# Patient Record
Sex: FEMALE | Race: WHITE | NOT HISPANIC OR LATINO | Employment: OTHER | URBAN - METROPOLITAN AREA
[De-identification: names, ages, dates, MRNs, and addresses within clinical notes are randomized per-mention and may not be internally consistent; named-entity substitution may affect disease eponyms.]

---

## 2017-01-01 ENCOUNTER — APPOINTMENT (OUTPATIENT)
Dept: ULTRASOUND IMAGING | Facility: HOSPITAL | Age: 82
End: 2017-01-01

## 2017-01-01 ENCOUNTER — APPOINTMENT (OUTPATIENT)
Dept: GENERAL RADIOLOGY | Facility: HOSPITAL | Age: 82
End: 2017-01-01

## 2017-01-01 PROCEDURE — 71020 HC CHEST PA AND LATERAL: CPT

## 2017-01-01 PROCEDURE — 76942 ECHO GUIDE FOR BIOPSY: CPT

## 2017-02-20 ENCOUNTER — OFFICE VISIT (OUTPATIENT)
Dept: FAMILY MEDICINE CLINIC | Facility: CLINIC | Age: 82
End: 2017-02-20

## 2017-02-20 VITALS
BODY MASS INDEX: 15.36 KG/M2 | DIASTOLIC BLOOD PRESSURE: 80 MMHG | RESPIRATION RATE: 16 BRPM | WEIGHT: 90 LBS | TEMPERATURE: 95 F | HEIGHT: 64 IN | SYSTOLIC BLOOD PRESSURE: 140 MMHG

## 2017-02-20 DIAGNOSIS — R00.0 TACHYCARDIA: ICD-10-CM

## 2017-02-20 DIAGNOSIS — I27.20 PULMONARY HYPERTENSION (HCC): ICD-10-CM

## 2017-02-20 DIAGNOSIS — E11.9 TYPE 2 DIABETES MELLITUS WITHOUT COMPLICATION, UNSPECIFIED LONG TERM INSULIN USE STATUS: ICD-10-CM

## 2017-02-20 DIAGNOSIS — J18.9 PNEUMONIA OF LEFT LUNG DUE TO INFECTIOUS ORGANISM, UNSPECIFIED PART OF LUNG: Primary | ICD-10-CM

## 2017-02-20 DIAGNOSIS — D64.9 ANEMIA, UNSPECIFIED TYPE: ICD-10-CM

## 2017-02-20 DIAGNOSIS — D64.9 ANEMIA, UNSPECIFIED TYPE: Primary | ICD-10-CM

## 2017-02-20 LAB
BASOPHILS # BLD AUTO: 0.01 10*3/MM3 (ref 0–0.2)
BASOPHILS NFR BLD AUTO: 0.2 % (ref 0–1.5)
BUN SERPL-MCNC: 18 MG/DL (ref 8–23)
BUN/CREAT SERPL: 31 (ref 7–25)
CALCIUM SERPL-MCNC: 9.6 MG/DL (ref 8.6–10.5)
CHLORIDE SERPL-SCNC: 103 MMOL/L (ref 98–107)
CO2 SERPL-SCNC: 30.4 MMOL/L (ref 22–29)
CREAT SERPL-MCNC: 0.58 MG/DL (ref 0.57–1)
EOSINOPHIL # BLD AUTO: 0.28 10*3/MM3 (ref 0–0.7)
EOSINOPHIL NFR BLD AUTO: 4.6 % (ref 0.3–6.2)
ERYTHROCYTE [DISTWIDTH] IN BLOOD BY AUTOMATED COUNT: 17 % (ref 11.7–13)
GLUCOSE SERPL-MCNC: 217 MG/DL (ref 65–99)
HCT VFR BLD AUTO: 37.5 % (ref 35.6–45.5)
HGB BLD-MCNC: 11.7 G/DL (ref 11.9–15.5)
IMM GRANULOCYTES # BLD: 0 10*3/MM3 (ref 0–0.03)
IMM GRANULOCYTES NFR BLD: 0 % (ref 0–0.5)
LYMPHOCYTES # BLD AUTO: 1.29 10*3/MM3 (ref 0.9–4.8)
LYMPHOCYTES NFR BLD AUTO: 21 % (ref 19.6–45.3)
MCH RBC QN AUTO: 28.1 PG (ref 26.9–32)
MCHC RBC AUTO-ENTMCNC: 31.2 G/DL (ref 32.4–36.3)
MCV RBC AUTO: 90.1 FL (ref 80.5–98.2)
MONOCYTES # BLD AUTO: 0.42 10*3/MM3 (ref 0.2–1.2)
MONOCYTES NFR BLD AUTO: 6.9 % (ref 5–12)
NEUTROPHILS # BLD AUTO: 4.13 10*3/MM3 (ref 1.9–8.1)
NEUTROPHILS NFR BLD AUTO: 67.3 % (ref 42.7–76)
PLATELET # BLD AUTO: 279 10*3/MM3 (ref 140–500)
POTASSIUM SERPL-SCNC: 3.5 MMOL/L (ref 3.5–5.2)
RBC # BLD AUTO: 4.16 10*6/MM3 (ref 3.9–5.2)
SODIUM SERPL-SCNC: 147 MMOL/L (ref 136–145)
WBC # BLD AUTO: 6.13 10*3/MM3 (ref 4.5–10.7)

## 2017-02-20 PROCEDURE — 99213 OFFICE O/P EST LOW 20 MIN: CPT

## 2017-02-20 NOTE — PROGRESS NOTES
Subjective   Maite Pacheco is a 81 y.o. female. Patient is here today for   Chief Complaint   Patient presents with   • Follow-up     Hospital f/u pneumonia           Vitals:    02/20/17 0954   BP: 140/80   Resp: 16   Temp: 95 °F (35 °C)     The following portions of the patient's history were reviewed and updated as appropriate: allergies, current medications, past family history, past medical history, past social history, past surgical history and problem list.    Past Medical History   Diagnosis Date   • Arthritis    • Diabetes mellitus    • Hyperglycemia    • Hyperlipidemia    • Inguinal hernia    • Osteoporosis       Allergies   Allergen Reactions   • Sulfa Antibiotics Rash      Social History     Social History   • Marital status: Single     Spouse name: N/A   • Number of children: N/A   • Years of education: N/A     Occupational History   • Not on file.     Social History Main Topics   • Smoking status: Current Every Day Smoker     Packs/day: 1.00     Years: 50.00     Types: Cigarettes   • Smokeless tobacco: Never Used   • Alcohol use No   • Drug use: No   • Sexual activity: Defer     Other Topics Concern   • Not on file     Social History Narrative        Current Outpatient Prescriptions:   •  atorvastatin (LIPITOR) 10 MG tablet, Take 1 tablet by mouth  daily, Disp: 90 tablet, Rfl: 3  •  Calcium Carbonate-Vitamin D (CALCIUM + D PO), Take 2 tablets by mouth. 500 IU of Vit D 630mg of Calcium, Disp: , Rfl:   •  diltiaZEM SR (CARDIZEM SR) 60 MG 12 hr capsule, Take 1 capsule by mouth Every 12 (Twelve) Hours., Disp: 60 capsule, Rfl: 1  •  metFORMIN (GLUCOPHAGE) 500 MG tablet, Take 1 tablet by mouth  twice a day, Disp: 180 tablet, Rfl: 3  •  MULTIPLE VITAMINS PO, Take 1 tablet by mouth., Disp: , Rfl:      Objective     History of Present Illness   The patient is here today for follow-up on pneumonia for which she was hospitalized in early January 2017    Review of Systems   Constitutional: Negative for chills and  fever.   Respiratory: Negative for cough, shortness of breath and wheezing.    Cardiovascular: Negative for chest pain, palpitations and leg swelling.   Gastrointestinal: Negative for abdominal pain, blood in stool, constipation and diarrhea.   Genitourinary: Negative.    Musculoskeletal: Negative.    Neurological: Negative.    Psychiatric/Behavioral: Negative.        Physical Exam   Constitutional: She is oriented to person, place, and time. She appears well-developed and well-nourished.   Cardiovascular: Normal rate, regular rhythm and normal heart sounds.    Pulmonary/Chest: Effort normal and breath sounds normal. No respiratory distress. She has no wheezes. She has no rales.   Abdominal: Soft. Bowel sounds are normal.   Musculoskeletal: Normal range of motion.   Neurological: She is alert and oriented to person, place, and time.   Skin: Skin is warm and dry.   Psychiatric: She has a normal mood and affect.   Nursing note and vitals reviewed.      ASSESSMENT  #1 history of recent left sided pneumonia          #2 recent diagnosis of pulmonary hypertension per echo done while patient hospitalized for pneumonia         #3 history of episode of supraventricular tachycardia while in the hospital for pneumonia          #4 anemia    DISCUSSION/SUMMARY   The patient's vital signs are normal today.  The patient was hospitalized for a severe left sided pneumonia with pleural effusion in early January.  While there the patient had an echocardiogram which showed moderate to severe pulmonary hypertension.  While in the hospital the patient had an episode of supraventricular tachycardia and was placed on diltiazem.  After her hospitalization the patient went to a rehabilitation center for about 2 weeks or so.  The patient states that she is feeling much better, having no cough or fever.  The patient's appetite is improving.  It was advised that the patient have a repeat CT of her chest so we will order this.  She will also  need follow-up with the pulmonologist and her cardiologist who saw her in the hospital.  Today I'm going to have a CBC and BMP drawn because of a history of anemia in the fact that she will need to have a CT scan need to know the status of her kidney functions.    PLAN  I will set up a repeat CT of her chest to follow-up on her pneumonia and lymphadenopathy detected time she had her CT while hospitalized in early January.  We will also set up follow-up appointments with her cardiologist and pulmonologist.  No Follow-up on file.

## 2017-02-27 ENCOUNTER — HOSPITAL ENCOUNTER (OUTPATIENT)
Dept: CT IMAGING | Facility: HOSPITAL | Age: 82
Discharge: HOME | End: 2017-02-27

## 2017-02-27 DIAGNOSIS — J18.9 PNEUMONIA OF LEFT LUNG DUE TO INFECTIOUS ORGANISM, UNSPECIFIED PART OF LUNG: ICD-10-CM

## 2017-02-27 LAB — CREAT BLDA-MCNC: 0.5 MG/DL (ref 0.6–1.3)

## 2017-02-27 PROCEDURE — 0 IOPAMIDOL 61 % SOLUTION

## 2017-02-27 PROCEDURE — 82565 ASSAY OF CREATININE: CPT

## 2017-02-27 PROCEDURE — 71260 CT THORAX DX C+: CPT

## 2017-02-27 RX ADMIN — IOPAMIDOL 75 ML: 612 INJECTION, SOLUTION INTRAVENOUS at 11:15

## 2017-03-01 ENCOUNTER — OFFICE VISIT (OUTPATIENT)
Dept: CARDIOLOGY | Facility: CLINIC | Age: 82
End: 2017-03-01

## 2017-03-01 ENCOUNTER — APPOINTMENT (OUTPATIENT)
Dept: LAB | Facility: HOSPITAL | Age: 82
End: 2017-03-01

## 2017-03-01 VITALS
SYSTOLIC BLOOD PRESSURE: 110 MMHG | HEIGHT: 63 IN | HEART RATE: 90 BPM | WEIGHT: 92 LBS | BODY MASS INDEX: 16.3 KG/M2 | DIASTOLIC BLOOD PRESSURE: 60 MMHG

## 2017-03-01 DIAGNOSIS — I27.20 PULMONARY HYPERTENSION (HCC): ICD-10-CM

## 2017-03-01 DIAGNOSIS — I49.3 PVC (PREMATURE VENTRICULAR CONTRACTION): Primary | ICD-10-CM

## 2017-03-01 LAB
ANION GAP SERPL CALCULATED.3IONS-SCNC: 11 MMOL/L
BUN BLD-MCNC: 16 MG/DL (ref 8–23)
BUN/CREAT SERPL: 31.4 (ref 7–25)
CALCIUM SPEC-SCNC: 9.8 MG/DL (ref 8.6–10.5)
CHLORIDE SERPL-SCNC: 104 MMOL/L (ref 98–107)
CO2 SERPL-SCNC: 29 MMOL/L (ref 22–29)
CREAT BLD-MCNC: 0.51 MG/DL (ref 0.57–1)
GFR SERPL CREATININE-BSD FRML MDRD: 116 ML/MIN/1.73
GLUCOSE BLD-MCNC: 124 MG/DL (ref 65–99)
MAGNESIUM SERPL-MCNC: 1.8 MG/DL (ref 1.6–2.4)
POTASSIUM BLD-SCNC: 3.6 MMOL/L (ref 3.5–5.2)
SODIUM BLD-SCNC: 144 MMOL/L (ref 136–145)

## 2017-03-01 PROCEDURE — 80048 BASIC METABOLIC PNL TOTAL CA: CPT | Performed by: INTERNAL MEDICINE

## 2017-03-01 PROCEDURE — 99214 OFFICE O/P EST MOD 30 MIN: CPT | Performed by: INTERNAL MEDICINE

## 2017-03-01 PROCEDURE — 36415 COLL VENOUS BLD VENIPUNCTURE: CPT | Performed by: INTERNAL MEDICINE

## 2017-03-01 PROCEDURE — 93000 ELECTROCARDIOGRAM COMPLETE: CPT | Performed by: INTERNAL MEDICINE

## 2017-03-01 PROCEDURE — 83735 ASSAY OF MAGNESIUM: CPT | Performed by: INTERNAL MEDICINE

## 2017-03-01 NOTE — PROGRESS NOTES
Date of Office Visit: 2017  Encounter Provider: Carolin Lantigua MD  Place of Service: [facility]  Patient Name: Maite Pacheco  :1935    Chief complaint  Follow-up of paroxysmal supraventricular tachycardia.    History of Present Illness  Patient is a pleasant 81-year-old female with history of diabetes hyperlipidemia who was admitted on  with pneumonia with acute respiratory failure hypoxia sepsis and left-sided rib pain from a loculated effusion..  In this setting she developed paroxysmal supraventricular tachycardia that resolved with IV Lopressor and then switch to oral metoprolol.  Due to pulmonary wheeze she was transitioned to diltiazem and sent to rehabilitation on this regimen.  An echocardiogram revealed normal systolic function with severe pulmonary hypertension with an RV systolic pressure 66 mmHg without significant valvular heart disease.    While in the rehabilitation she discontinued diltiazem due to complaints of worsening palpitations which resolved after this was discontinued.  She feels she is back to most all of her usual activities go shopping and does household activities without any chest pain shortness of breath palpitations syncope near syncope.    Past Medical History   Diagnosis Date   • Abnormal CT of the abdomen    • Allergic rhinitis    • Arthritis    • Chronic pancreatitis    • Diabetes mellitus    • Hyperglycemia    • Hyperlipidemia    • Inguinal hernia    • Neck pain    • Osteoporosis    • Pancreatic cyst    • Urinary frequency      Past Surgical History   Procedure Laterality Date   • Appendectomy     • Inguinal hernia repair Left 2008     Open left inguinal henria repair with mesh-Dr. Zahraa Brito    • Colonoscopy N/A 2003     Colonoscopy to the cecum, terminal ileoscopy-Dr. Zahraa Brito      Outpatient Medications Prior to Visit   Medication Sig Dispense Refill   • atorvastatin (LIPITOR) 10 MG tablet  Take 1 tablet by mouth  daily 90 tablet 3   • Calcium Carbonate-Vitamin D (CALCIUM + D PO) Take 2 tablets by mouth. 500 IU of Vit D 630mg of Calcium     • metFORMIN (GLUCOPHAGE) 500 MG tablet Take 1 tablet by mouth  twice a day 180 tablet 3   • MULTIPLE VITAMINS PO Take 1 tablet by mouth.     • diltiaZEM SR (CARDIZEM SR) 60 MG 12 hr capsule Take 1 capsule by mouth Every 12 (Twelve) Hours. 60 capsule 1     No facility-administered medications prior to visit.      Allergies as of 03/01/2017 - Sin as Reviewed 03/01/2017   Allergen Reaction Noted   • Sulfa antibiotics Rash 04/05/2016     Social History     Social History   • Marital status: Single     Spouse name: N/A   • Number of children: N/A   • Years of education: N/A     Occupational History   • Not on file.     Social History Main Topics   • Smoking status: Former Smoker     Packs/day: 1.00     Years: 50.00     Types: Cigarettes   • Smokeless tobacco: Never Used   • Alcohol use No   • Drug use: No   • Sexual activity: Defer     Other Topics Concern   • Not on file     Social History Narrative     Family History   Problem Relation Age of Onset   • Stroke Mother    • Heart disease Mother    • Heart disease Father      Review of Systems   Constitution: Negative for fever, malaise/fatigue, weight gain and weight loss.   HENT: Negative for ear pain, hearing loss, nosebleeds and sore throat.    Eyes: Negative for double vision, pain, vision loss in left eye and vision loss in right eye.   Cardiovascular:        See history of present illness.   Respiratory: Negative for cough, shortness of breath, sleep disturbances due to breathing, snoring and wheezing.    Endocrine: Negative for cold intolerance, heat intolerance and polyuria.   Skin: Negative for itching, poor wound healing and rash.   Musculoskeletal: Negative for joint pain, joint swelling and myalgias.   Gastrointestinal: Negative for abdominal pain, diarrhea, hematochezia, nausea and vomiting.   Genitourinary:  "Negative for hematuria and hesitancy.   Neurological: Negative for numbness, paresthesias and seizures.   Psychiatric/Behavioral: Negative for depression. The patient is not nervous/anxious.      Objective:     Vitals:    03/01/17 1244   BP: 110/60   Pulse: 90   Weight: 92 lb (41.7 kg)   Height: 63\" (160 cm)     Body mass index is 16.3 kg/(m^2).    Physical Exam   Constitutional: She is oriented to person, place, and time. She appears well-developed and well-nourished.   HENT:   Head: Normocephalic.   Nose: Nose normal.   Mouth/Throat: Oropharynx is clear and moist.   Eyes: Conjunctivae and EOM are normal. Pupils are equal, round, and reactive to light. Right eye exhibits no discharge. No scleral icterus.   Neck: Normal range of motion. Neck supple. No JVD present. No thyromegaly present.   Cardiovascular: Normal rate, regular rhythm, normal heart sounds and intact distal pulses.  Exam reveals no gallop and no friction rub.    No murmur heard.  Pulses:       Carotid pulses are 2+ on the right side, and 2+ on the left side.       Radial pulses are 2+ on the right side, and 2+ on the left side.        Femoral pulses are 2+ on the right side, and 2+ on the left side.       Popliteal pulses are 2+ on the right side, and 2+ on the left side.        Dorsalis pedis pulses are 2+ on the right side, and 2+ on the left side.        Posterior tibial pulses are 2+ on the right side, and 2+ on the left side.   Pulmonary/Chest: Effort normal and breath sounds normal. No respiratory distress. She has no wheezes. She has no rales.   Abdominal: Soft. Bowel sounds are normal. She exhibits no distension. There is no hepatosplenomegaly. There is no tenderness. There is no rebound.   Musculoskeletal: Normal range of motion. She exhibits no edema or tenderness.   Neurological: She is alert and oriented to person, place, and time.   Skin: Skin is warm and dry. No rash noted. No erythema.   Psychiatric: She has a normal mood and affect. " Her behavior is normal. Judgment and thought content normal.   Vitals reviewed.    Lab Review:     ECG 12 Lead  Date/Time: 3/4/2017 2:55 PM  Performed by: MARIEL BURR  Authorized by: MARIEL BURR   Comparison: compared with previous ECG   Similar to previous ECG  Rhythm: sinus rhythm  Ectopy: PVCs  Conduction comments: QTc = 460 msec  Clinical impression: abnormal ECG          Assessment:       Diagnosis Plan   1. PVC (premature ventricular contraction)  Basic Metabolic Panel    Magnesium   2. Pulmonary hypertension       Plan:       1.  Paroxysmal supra-ventricular tachycardia.  This was a postpneumonic event.  Blood pressure borderline low and she is audibly wheezing therefore we'll defer heading back metoprolol at this time.  2.  Persistent left pleural effusion and lung disease with wheeze.  I've asked her to follow-up with Dr. Amaro whom she plans to see in the next one week.  3.  PVCs, as above in addition we'll check a magnesium potassium.  Once her pulmonary status has improved we'll consider a Lexiscan cardiac stress test   4.  Diabetes  5.  Dyslipidemia     Maite Pacheco   Home Medication Instructions ALEN:    Printed on:03/04/17 8129   Medication Information                      atorvastatin (LIPITOR) 10 MG tablet  Take 1 tablet by mouth  daily             Calcium Carbonate-Vitamin D (CALCIUM + D PO)  Take 2 tablets by mouth. 500 IU of Vit D 630mg of Calcium             metFORMIN (GLUCOPHAGE) 500 MG tablet  Take 1 tablet by mouth  twice a day             MULTIPLE VITAMINS PO  Take 1 tablet by mouth.               EMR Dragon/Transcription disclaimer:   Much of this encounter note is an electronic transcription/translation of spoken language to printed text. The electronic translation of spoken language may permit erroneous, or at times, nonsensical words or phrases to be inadvertently transcribed; Although I have reviewed the note for such errors, some may still exist.

## 2017-03-02 ENCOUNTER — TELEPHONE (OUTPATIENT)
Dept: FAMILY MEDICINE CLINIC | Facility: CLINIC | Age: 82
End: 2017-03-02

## 2017-03-02 ENCOUNTER — TELEPHONE (OUTPATIENT)
Dept: CARDIOLOGY | Facility: CLINIC | Age: 82
End: 2017-03-02

## 2017-03-02 NOTE — TELEPHONE ENCOUNTER
PATIENT AWARE. SHE SAID SHE WOULD CALL AND SET UP THE APPOINTMENT WITH THE PULMONOLOGIST.    ----- Message from Tong Berg MD sent at 3/2/2017 12:16 PM EST -----  Contact: PATIENT  Tell her I would like her to see her pulmonary doctor concerning her repeat CT of the chest.  Even though it is better it has not completely resolved.  ----- Message -----     From: Lali Ferguson MA     Sent: 3/1/2017   3:23 PM       To: Tong Berg MD    PATIENT WAS RETURNING YOUR CALL- SHE SAID SHE HAS NOT SET UP AN APPOINTMENT WITH DR. YOUNG (PULMONOLOGIST) YET. SHE DID SEE DR. BURR TODAY.

## 2017-03-04 PROBLEM — I49.3 PVC (PREMATURE VENTRICULAR CONTRACTION): Status: ACTIVE | Noted: 2017-03-04

## 2017-03-27 ENCOUNTER — OFFICE VISIT (OUTPATIENT)
Dept: SURGERY | Facility: CLINIC | Age: 82
End: 2017-03-27

## 2017-03-27 VITALS — WEIGHT: 96.2 LBS | OXYGEN SATURATION: 96 % | HEIGHT: 63 IN | BODY MASS INDEX: 17.05 KG/M2 | HEART RATE: 86 BPM

## 2017-03-27 DIAGNOSIS — K43.9 HERNIA OF ABDOMINAL WALL: ICD-10-CM

## 2017-03-27 DIAGNOSIS — Z72.0 TOBACCO USE: Primary | ICD-10-CM

## 2017-03-27 PROCEDURE — 99213 OFFICE O/P EST LOW 20 MIN: CPT | Performed by: SURGERY

## 2017-03-27 RX ORDER — MELATONIN
1000 DAILY
COMMUNITY

## 2017-03-28 NOTE — PROGRESS NOTES
"CC:   Chief Complaint   Patient presents with   • Follow-up     6 month f/up right inguinal hernia       Subjective:   Maite Pacheco is a 81 y.o. female who is seen today for right inguinal hernia follow-up, having been seen previously, with decision for observation.  When she initially found this it was a painless lump, had not increased in size, not associated with any significant findings, but she chose to have evaluation albeit.  I did an open left inguinal hernia repair for recurrent 2008, for which she has recuperated well with no recurrence.    Since her last visit, unfortunately she was admitted to the hospital with pneumonia, that being on December 30, with a four-day hospital stay, an incident were \"I went to the [facility]\", she had a \"nervous breakdown\", with complications of uncontrolled hypertension, pleural effusion, uncontrolled diabetes.  She had to go to [facility] on oxygen and has very poor things to say that that experience.  Happily out of all of this however she has quit smoking.  She has lost 10 pounds     She describes her right inguinal hernia is not being worse, not associated with the pain on a regular basis, not protruding out as much as it was previously, just sometimes at night.  She does have shortness of breath with lifting and moving or rushing around which is worse now, but she doesn't really necessarily associate that with her hernia more with her pulmonary situation.  She says after all \"I am an 82-year-old \".       Past Medical History:   Diagnosis Date   • Abnormal CT of the abdomen    • Allergic rhinitis    • Arthritis    • Chronic pancreatitis    • Diabetes mellitus     type 2   • History of pneumonia 12/2016   • Hyperglycemia    • Hyperlipidemia    • Inguinal hernia    • Neck pain    • Osteoporosis    • Pancreatic cyst    • Urinary frequency      Past Surgical History:   Procedure Laterality Date   • APPENDECTOMY N/A 1943   • COLONOSCOPY N/A 03/31/2003    Colonoscopy to the cecum, " "terminal ileoscopy, normal-Dr. Zahraa Brito    • INGUINAL HERNIA REPAIR Left 01/18/2008    Open left inguinal hernia repair with mesh-Dr. Zahraa Brito        Current Outpatient Prescriptions:   •  atorvastatin (LIPITOR) 10 MG tablet, Take 1 tablet by mouth  daily, Disp: 90 tablet, Rfl: 3  •  Calcium Carbonate-Vitamin D (CALCIUM + D PO), Take 2 tablets by mouth Daily. 500 IU of Vit D 630mg of Calcium, Disp: , Rfl:   •  cholecalciferol (VITAMIN D3) 1000 UNITS tablet, Take 1,000 Units by mouth Daily. Takes takes from October to May, Disp: , Rfl:   •  metFORMIN (GLUCOPHAGE) 500 MG tablet, Take 1 tablet by mouth  twice a day, Disp: 180 tablet, Rfl: 3  •  MULTIPLE VITAMINS PO, Take 1 tablet by mouth Daily., Disp: , Rfl:     Allergies   Allergen Reactions   • Sulfa Antibiotics Rash     Family History   Problem Relation Age of Onset   • Stroke Mother    • Heart disease Mother      Social History   Substance Use Topics   • Smoking status: Former Smoker     Packs/day: 1.00     Years: 50.00     Types: Cigarettes     Quit date: 12/30/2016   • Smokeless tobacco: Never Used   • Alcohol use No     Occupation/Social: She is single, if she needed surgery she would be helped by [employer]. The most strenuous activity that she performs is vacuuming.     Preventative Medicine  Colonoscopy: 2003, [facility], denies history of polyps  Mammogram: 1/2016    Review of Systems   Constitutional: Positive for fatigue and unexpected weight change.   HENT: Positive for rhinorrhea.    Respiratory: Positive for shortness of breath.    Musculoskeletal: Positive for gait problem.   Allergic/Immunologic: Positive for environmental allergies.   Neurological: Positive for weakness.   Psychiatric/Behavioral: The patient is nervous/anxious.    All other systems reviewed and are negative.    Objective:   Vitals:    03/27/17 1550   Pulse: 86   SpO2: 96%   Weight: 96 lb 3.2 oz (43.6 kg)   Height: 63\" (160 cm)     Body mass index is 17.04 " kg/(m^2).    Physical Exam   Constitutional: She is oriented to person, place, and time. She appears well-developed and well-nourished.   HENT:   Head: Normocephalic and atraumatic.   Wears hearing aids bilaterally   Eyes: Conjunctivae are normal. No scleral icterus.   Cardiovascular: Normal rate and regular rhythm.    No murmur heard.  Pulmonary/Chest: Effort normal and breath sounds normal. She has no wheezes.   Abdominal: Soft. Bowel sounds are normal. She exhibits no distension. There is no tenderness.       Musculoskeletal: Normal range of motion. She exhibits no deformity.   Neurological: She is alert and oriented to person, place, and time.   Skin: Skin is warm and dry.   Psychiatric: She has a normal mood and affect. Her behavior is normal.     Impression:  - Right inguinal hernia, not worsening, with minimal symptoms  - Status post open left inguinal hernia repair  - Status post open appendectomy, which would make a laparoscopic approach to her right inguinal hernia more difficult  - Tobacco use, 50 pack year smoking history, with recent pneumonia, pleural effusion, and multiple complications  - Unintended weight loss  - Element of deconditioning with rehabilitation following [facility] in [place]  - Persistent small pleural effusion per patient, with associated shortness of breath  - Recent cessation of smoking    Plan:  - Observation.  Again I discussed  today the possibility to attempt a laparoscopic approach but likely need for an open approach due to her prior open open appendectomy and the location of its scar. Due to her lack of bothersome symptoms at this time, and her recent fairly traumatic events with the hospital, and more importantly residual symptoms, we decided not to pursue surgery at this time.  - Given the option of a scheduled follow-up visit versus a call from her if her symptoms become more worrisome, she has chosen to simply call if they become more symptomatic.  She getting we asked  however if I decided to retire to give her call in the heads of that she can go ahead and schedule her surgery at that time.     Zahraa Brito MD

## 2017-04-11 ENCOUNTER — TELEPHONE (OUTPATIENT)
Dept: FAMILY MEDICINE CLINIC | Facility: CLINIC | Age: 82
End: 2017-04-11

## 2017-04-11 RX ORDER — METFORMIN HYDROCHLORIDE 500 MG/1
500 TABLET, EXTENDED RELEASE ORAL 2 TIMES DAILY
Qty: 180 TABLET | Refills: 1 | Status: SHIPPED | OUTPATIENT
Start: 2017-04-11 | End: 2017-10-12 | Stop reason: SDUPTHER

## 2017-04-11 NOTE — TELEPHONE ENCOUNTER
RX SENT. PATIENT AWARE.    ----- Message from Tong Berg MD sent at 4/10/2017  2:28 PM EDT -----  Change metformin to metformin extended release tablets 500 mg 2 daily #60 with 5 refills.  If the extended release type pills still cause diarrhea let us know.  In the meantime the patient can use Imodium over-the-counter for diarrhea  ----- Message -----     From: Lali Ferguson MA     Sent: 4/10/2017  10:46 AM       To: Tong Berg MD        ----- Message -----     From: Jenny Martinez     Sent: 4/10/2017  10:42 AM       To: Lali Ferguson MA    REFILL ON METFORMIN 500 2QD HOWEVER PT SAYS THAT IT IS GIVING HER DIARRHEA AND SHE SAID DR NATH WOULD GIVE HER SOMETHING FOR THAT AS WELL.    PLEASE CALL PT WITH ANY QUESTIONS.    PT# [phone]    [facility]

## 2017-04-17 ENCOUNTER — TRANSCRIBE ORDERS (OUTPATIENT)
Dept: ADMINISTRATIVE | Facility: HOSPITAL | Age: 82
End: 2017-04-17

## 2017-04-17 DIAGNOSIS — R91.8 PULMONARY NODULES: Primary | ICD-10-CM

## 2017-05-15 ENCOUNTER — HOSPITAL ENCOUNTER (OUTPATIENT)
Dept: CT IMAGING | Facility: HOSPITAL | Age: 82
Discharge: HOME | End: 2017-05-15
Attending: INTERNAL MEDICINE | Admitting: INTERNAL MEDICINE

## 2017-05-15 DIAGNOSIS — R91.8 PULMONARY NODULES: ICD-10-CM

## 2017-05-15 PROCEDURE — 71250 CT THORAX DX C-: CPT

## 2017-06-15 DIAGNOSIS — M81.0 OSTEOPOROSIS: ICD-10-CM

## 2017-06-15 DIAGNOSIS — I10 ESSENTIAL HYPERTENSION: Primary | ICD-10-CM

## 2017-06-15 DIAGNOSIS — E78.5 HYPERLIPIDEMIA, UNSPECIFIED HYPERLIPIDEMIA TYPE: ICD-10-CM

## 2017-06-15 DIAGNOSIS — E11.9 TYPE 2 DIABETES MELLITUS WITHOUT COMPLICATION, UNSPECIFIED LONG TERM INSULIN USE STATUS: ICD-10-CM

## 2017-06-17 LAB
25(OH)D3+25(OH)D2 SERPL-MCNC: 33.7 NG/ML (ref 30–100)
ALBUMIN SERPL-MCNC: 4.4 G/DL (ref 3.5–5.2)
ALBUMIN/GLOB SERPL: 1.5 G/DL
ALP SERPL-CCNC: 69 U/L (ref 39–117)
ALT SERPL-CCNC: 13 U/L (ref 1–33)
AST SERPL-CCNC: 20 U/L (ref 1–32)
BILIRUB SERPL-MCNC: 0.9 MG/DL (ref 0.1–1.2)
BUN SERPL-MCNC: 18 MG/DL (ref 8–23)
BUN/CREAT SERPL: 28.1 (ref 7–25)
CALCIUM SERPL-MCNC: 10.2 MG/DL (ref 8.6–10.5)
CHLORIDE SERPL-SCNC: 102 MMOL/L (ref 98–107)
CHOLEST SERPL-MCNC: 132 MG/DL (ref 0–200)
CO2 SERPL-SCNC: 27.8 MMOL/L (ref 22–29)
CREAT SERPL-MCNC: 0.64 MG/DL (ref 0.57–1)
GLOBULIN SER CALC-MCNC: 2.9 GM/DL
GLUCOSE SERPL-MCNC: 135 MG/DL (ref 65–99)
HBA1C MFR BLD: 6.79 % (ref 4.8–5.6)
HDLC SERPL-MCNC: 76 MG/DL (ref 40–60)
LDLC SERPL CALC-MCNC: 46 MG/DL (ref 0–100)
LDLC/HDLC SERPL: 0.6 {RATIO}
POTASSIUM SERPL-SCNC: 4 MMOL/L (ref 3.5–5.2)
PROT SERPL-MCNC: 7.3 G/DL (ref 6–8.5)
SODIUM SERPL-SCNC: 143 MMOL/L (ref 136–145)
TRIGL SERPL-MCNC: 52 MG/DL (ref 0–150)
VLDLC SERPL CALC-MCNC: 10.4 MG/DL (ref 5–40)

## 2017-06-23 ENCOUNTER — OFFICE VISIT (OUTPATIENT)
Dept: FAMILY MEDICINE CLINIC | Facility: CLINIC | Age: 82
End: 2017-06-23

## 2017-06-23 VITALS
TEMPERATURE: 97.6 F | HEIGHT: 63 IN | RESPIRATION RATE: 16 BRPM | SYSTOLIC BLOOD PRESSURE: 132 MMHG | WEIGHT: 103 LBS | OXYGEN SATURATION: 93 % | BODY MASS INDEX: 18.25 KG/M2 | DIASTOLIC BLOOD PRESSURE: 76 MMHG | HEART RATE: 89 BPM

## 2017-06-23 DIAGNOSIS — K43.9 HERNIA OF ABDOMINAL WALL: ICD-10-CM

## 2017-06-23 DIAGNOSIS — E78.00 PURE HYPERCHOLESTEROLEMIA: ICD-10-CM

## 2017-06-23 DIAGNOSIS — E11.9 TYPE 2 DIABETES MELLITUS WITHOUT COMPLICATION, WITHOUT LONG-TERM CURRENT USE OF INSULIN (HCC): Primary | ICD-10-CM

## 2017-06-23 DIAGNOSIS — M81.0 OSTEOPOROSIS: ICD-10-CM

## 2017-06-23 PROCEDURE — 99213 OFFICE O/P EST LOW 20 MIN: CPT

## 2017-06-23 RX ORDER — ATORVASTATIN CALCIUM 10 MG/1
10 TABLET, FILM COATED ORAL DAILY
Qty: 90 TABLET | Refills: 3 | Status: ON HOLD | OUTPATIENT
Start: 2017-06-23 | End: 2017-10-25

## 2017-06-23 NOTE — PROGRESS NOTES
Subjective   Maite Pacheco is a 82 y.o. female. Patient is here today for   Chief Complaint   Patient presents with   • Hyperlipidemia   • Hypertension   • Diabetes          Vitals:    06/23/17 0949   BP: 132/76   Pulse: 89   Resp: 16   Temp: 97.6 °F (36.4 °C)   SpO2: 93%     The following portions of the patient's history were reviewed and updated as appropriate: allergies, current medications, past family history, past medical history, past social history, past surgical history and problem list.    Past Medical History:   Diagnosis Date   • Abnormal CT of the abdomen    • Allergic rhinitis    • Arthritis    • Chronic pancreatitis    • Diabetes mellitus     type 2   • History of pneumonia 12/2016   • Hyperglycemia    • Hyperlipidemia    • Inguinal hernia    • Neck pain    • Osteoporosis    • Pancreatic cyst    • Urinary frequency       Allergies   Allergen Reactions   • Sulfa Antibiotics Rash      Social History     Social History   • Marital status: Single     Spouse name: N/A   • Number of children: 0   • Years of education: COLLEGE GRADUATE     Occupational History   • RETIRED Retired     Social History Main Topics   • Smoking status: Former Smoker     Packs/day: 1.00     Years: 50.00     Types: Cigarettes     Quit date: 12/30/2016   • Smokeless tobacco: Never Used   • Alcohol use No   • Drug use: No   • Sexual activity: Defer     Other Topics Concern   • Not on file     Social History Narrative        Current Outpatient Prescriptions:   •  ANORO ELLIPTA 62.5-25 MCG/INH aerosol powder , , Disp: , Rfl:   •  Calcium Carbonate-Vitamin D (CALCIUM + D PO), Take 2 tablets by mouth Daily. 500 IU of Vit D 630mg of Calcium, Disp: , Rfl:   •  cholecalciferol (VITAMIN D3) 1000 UNITS tablet, Take 1,000 Units by mouth Daily. Takes takes from October to May, Disp: , Rfl:   •  metFORMIN ER (GLUCOPHAGE-XR) 500 MG 24 hr tablet, Take 1 tablet by mouth 2 (Two) Times a Day., Disp: 180 tablet, Rfl: 1  •  MULTIPLE VITAMINS PO, Take 1  tablet by mouth Daily., Disp: , Rfl:   •  atorvastatin (LIPITOR) 10 MG tablet, Take 1 tablet by mouth Daily., Disp: 90 tablet, Rfl: 3     Objective     History of Present Illness    The patient is here today for follow-up on type 2 diabetes mellitus,, hyperlipidemia, history of anemia, and osteoporosis    Review of Systems   Constitutional: Negative for chills, fatigue and fever.   HENT: Negative.    Respiratory: Negative for cough, shortness of breath and wheezing.         The patient is currently on Anoro inhaler 2 puffs once daily and is doing well   Cardiovascular: Negative for chest pain, palpitations and leg swelling.   Gastrointestinal: Negative for blood in stool.        The patient has been experiencing some abdominal cramping and bloating in the morning after taking her metformin.  The patient states she eats very little at breakfast time.  The patient states that when she takes her second metformin after lunch she usually has no trouble because she eats more at that time.  The patient has a right lower abdominal wall hernia which is very small and it gives her no pain at this time.  She has consulted Dr. Brito for this but is not going to have surgery until perhaps next year.   Genitourinary: Negative.    Musculoskeletal:        Minor aches and pains only   Neurological: Negative.    Hematological: Negative.    Psychiatric/Behavioral: Negative.        Physical Exam   Constitutional: She is oriented to person, place, and time. She appears well-developed and well-nourished.   Neck:   Carotid pulses normal   Cardiovascular: Normal rate, regular rhythm and normal heart sounds.    Pulmonary/Chest: Effort normal. No respiratory distress. She has no wheezes. She has no rales.   Very minimally decreased breath sounds   Abdominal: Soft. Bowel sounds are normal. She exhibits no distension and no mass. There is no tenderness.   Small right lower abdominal wall hernia, nontender and easily reducible    Musculoskeletal: Normal range of motion.   Neurological: She is alert and oriented to person, place, and time.   Skin: Skin is warm and dry.   Psychiatric: She has a normal mood and affect.   Nursing note and vitals reviewed.      ASSESSMENT  #1 type II diabetes mellitus         #2 hyperlipidemia       #3 history of osteoporosis         #4 small right lower abdominal wall hernia, asymptomatic    DISCUSSION/SUMMARY   The patient's vital signs are normal today.  CMP is normal except for elevated fasting blood sugar of 135 but the patient's hemoglobin A1c is 6.79%, which is slightly better than on last visit.  The patient is having some abdominal cramping and bloating after taking her first metformin in the morning.  The patient states that she eats very little for breakfast.  Eats her largest meal at lunch.  She typically takes her second metformin after lunch and has no problem from that.  I have asked her to take both her metformin 500 mg extended release tablets after lunch.  She will let me know if she has abdominal cramping or bloating doing this.  Vitamin D level is 33.7.  I've asked the patient to take vitamin D 1000 international unit's daily in addition to her calcium plus vitamin D.  The patient's total cholesterol is 132, triglycerides 52, HDL cholesterol 76, and LDL cholesterol 76.  The patient was hospitalized in December 2016 for acute respiratory failure secondary to pneumonia.  The patient recovered from this and is now doing well.  There was a questionable nodular density on her CT which is now resolved.  The patient has stopped smoking.  She is seeing her rheumatologist for some chronic lung disease and is on Anoro inhaler 2 puffs once daily.    PLAN   Recheck in about 6 months with fasting CMP, lipid panel and HbA1c  No Follow-up on file.

## 2017-10-04 ENCOUNTER — OFFICE VISIT (OUTPATIENT)
Dept: CARDIOLOGY | Facility: CLINIC | Age: 82
End: 2017-10-04

## 2017-10-04 VITALS
DIASTOLIC BLOOD PRESSURE: 80 MMHG | HEART RATE: 71 BPM | HEIGHT: 64 IN | BODY MASS INDEX: 17.42 KG/M2 | SYSTOLIC BLOOD PRESSURE: 146 MMHG | WEIGHT: 102 LBS

## 2017-10-04 DIAGNOSIS — I27.20 PULMONARY HYPERTENSION (HCC): ICD-10-CM

## 2017-10-04 DIAGNOSIS — R94.31 ABNORMAL EKG: Primary | ICD-10-CM

## 2017-10-04 DIAGNOSIS — I49.3 PVC (PREMATURE VENTRICULAR CONTRACTION): ICD-10-CM

## 2017-10-04 DIAGNOSIS — E11.59 TYPE 2 DIABETES MELLITUS WITH OTHER CIRCULATORY COMPLICATION, WITHOUT LONG-TERM CURRENT USE OF INSULIN (HCC): ICD-10-CM

## 2017-10-04 PROCEDURE — 99214 OFFICE O/P EST MOD 30 MIN: CPT | Performed by: INTERNAL MEDICINE

## 2017-10-04 PROCEDURE — 93000 ELECTROCARDIOGRAM COMPLETE: CPT | Performed by: INTERNAL MEDICINE

## 2017-10-04 NOTE — PROGRESS NOTES
Date of Office Visit: 10/04/2017  Encounter Provider: Carolin Lantigua MD  Place of Service: [facility] CARDIOLOGY  Patient Name: Maite Pacheco  :1935    Chief complaint  Follow-up of paroxysmal supraventricular tachycardia, ulnar hypertension, PVCs    History of Present Illness  Patient is a pleasant 82-year-old female with history of diabetes hyperlipidemia who was admitted on  with pneumonia with acute respiratory failure hypoxia sepsis and left-sided rib pain from a loculated effusion..  In this setting she developed paroxysmal supraventricular tachycardia that resolved with IV Lopressor and then switch to oral metoprolol.  Due to pulmonary wheeze she was transitioned to diltiazem and sent to rehabilitation on this regimen.  An echocardiogram revealed normal systolic function with severe pulmonary hypertension with an RV systolic pressure 66 mmHg without significant valvular heart disease.    Since last visit she feels back to her usual self just chronic baseline dyspnea on exertion is unchanged.  She has mild dependent edema she denies any chest pain syncope near syncope or palpitations.  She has not been checking her blood pressure regularly.  Get a follow up CT scan performed by Dr. Chen with some improvement in pleural effusion.  A repeat CT is planned for next year    Past Medical History:   Diagnosis Date   • Abnormal CT of the abdomen    • Allergic rhinitis    • Arthritis    • Chronic pancreatitis    • Diabetes mellitus     type 2   • Eustachian tube dysfunction    • History of pneumonia 2016   • Hyperglycemia    • Hyperlipidemia    • Inguinal hernia    • Neck pain    • Osteoporosis    • Pancreatic cyst    • Urinary frequency      Past Surgical History:   Procedure Laterality Date   • APPENDECTOMY N/A 1943   • COLONOSCOPY N/A 2003    Colonoscopy to the cecum, terminal ileoscopy, normal-Dr. Zahraa Brito    • INGUINAL HERNIA REPAIR Left 2008     Open left inguinal hernia repair with mesh-Dr. Zahraa Brito      Outpatient Medications Prior to Visit   Medication Sig Dispense Refill   • ANORO ELLIPTA 62.5-25 MCG/INH aerosol powder       • atorvastatin (LIPITOR) 10 MG tablet Take 1 tablet by mouth Daily. 90 tablet 3   • Calcium Carbonate-Vitamin D (CALCIUM + D PO) Take 2 tablets by mouth Daily. 500 IU of Vit D 630mg of Calcium     • cholecalciferol (VITAMIN D3) 1000 UNITS tablet Take 1,000 Units by mouth Daily. Takes takes from October to May     • metFORMIN ER (GLUCOPHAGE-XR) 500 MG 24 hr tablet Take 1 tablet by mouth 2 (Two) Times a Day. 180 tablet 1   • MULTIPLE VITAMINS PO Take 1 tablet by mouth Daily.       No facility-administered medications prior to visit.      Allergies as of 10/04/2017 - Sin as Reviewed 10/04/2017   Allergen Reaction Noted   • Sulfa antibiotics Rash 04/05/2016     Social History     Social History   • Marital status: Single     Spouse name: N/A   • Number of children: 0   • Years of education: COLLEGE GRADUATE     Occupational History   • RETIRED Retired     Social History Main Topics   • Smoking status: Former Smoker     Packs/day: 1.00     Years: 50.00     Types: Cigarettes     Quit date: 12/30/2016   • Smokeless tobacco: Never Used   • Alcohol use No   • Drug use: No   • Sexual activity: Defer     Other Topics Concern   • Not on file     Social History Narrative     Family History   Problem Relation Age of Onset   • Stroke Mother    • Heart disease Mother      Review of Systems   Constitution: Negative for fever, malaise/fatigue, weight gain and weight loss.   HENT: Negative for ear pain, hearing loss, nosebleeds and sore throat.    Eyes: Negative for double vision, pain, vision loss in left eye and vision loss in right eye.   Cardiovascular: Positive for leg swelling.        See history of present illness.   Respiratory: Positive for shortness of breath. Negative for cough, sleep disturbances due to breathing, snoring and  "wheezing.    Endocrine: Negative for cold intolerance, heat intolerance and polyuria.   Skin: Negative for itching, poor wound healing and rash.   Musculoskeletal: Negative for joint pain, joint swelling and myalgias.   Gastrointestinal: Negative for abdominal pain, diarrhea, hematochezia, nausea and vomiting.   Genitourinary: Negative for hematuria and hesitancy.   Neurological: Negative for numbness, paresthesias and seizures.   Psychiatric/Behavioral: Negative for depression. The patient is not nervous/anxious.      Objective:     Vitals:    10/04/17 1212   BP: 146/80   Pulse: 71   Weight: 102 lb (46.3 kg)   Height: 64\" (162.6 cm)     Body mass index is 17.51 kg/(m^2).    Physical Exam   Constitutional: She is oriented to person, place, and time. She appears well-developed and well-nourished.   HENT:   Head: Normocephalic.   Nose: Nose normal.   Mouth/Throat: Oropharynx is clear and moist.   Eyes: Conjunctivae and EOM are normal. Pupils are equal, round, and reactive to light. Right eye exhibits no discharge. No scleral icterus.   Neck: Normal range of motion. Neck supple. No JVD present. No thyromegaly present.   Cardiovascular: Normal rate, regular rhythm, normal heart sounds and intact distal pulses.  Exam reveals no gallop and no friction rub.    No murmur heard.  Pulses:       Carotid pulses are 2+ on the right side, and 2+ on the left side.       Radial pulses are 2+ on the right side, and 2+ on the left side.        Femoral pulses are 2+ on the right side, and 2+ on the left side.       Popliteal pulses are 2+ on the right side, and 2+ on the left side.        Dorsalis pedis pulses are 2+ on the right side, and 2+ on the left side.        Posterior tibial pulses are 2+ on the right side, and 2+ on the left side.   Pulmonary/Chest: Effort normal and breath sounds normal. No respiratory distress. She has no wheezes. She has no rales.   Abdominal: Soft. Bowel sounds are normal. She exhibits no distension. " There is no hepatosplenomegaly. There is no tenderness. There is no rebound.   Musculoskeletal: Normal range of motion. She exhibits no edema or tenderness.   Neurological: She is alert and oriented to person, place, and time.   Skin: Skin is warm and dry. No rash noted. No erythema.   Psychiatric: She has a normal mood and affect. Her behavior is normal. Judgment and thought content normal.   Vitals reviewed.    Lab Review:     ECG 12 Lead  Date/Time: 10/4/2017 1:29 AM  Performed by: MARIEL BURR  Authorized by: MARIEL BURR   Comparison: compared with previous ECG   Similar to previous ECG  Rhythm: sinus rhythm  Conduction comments: QTc = 422 msec  Clinical impression: abnormal ECG          Assessment:       Diagnosis Plan   1. Abnormal EKG  Adult Stress Echo W/ Cont or Stress Agent if Necessary Per Protocol   2. Pulmonary hypertension  Adult Stress Echo W/ Cont or Stress Agent if Necessary Per Protocol   3. PVC (premature ventricular contraction)  Adult Stress Echo W/ Cont or Stress Agent if Necessary Per Protocol   4. Type 2 diabetes mellitus with other circulatory complication, without long-term current use of insulin       Plan:       1.  Paroxysmal supra-ventricular tachycardia.  Stable at this time.  I've asked her to check her blood pressure more consistently at home and if elevated we'll add low-dose beta blocker therapy  2.  Persistent left pleural effusion, clinically much improved, followed by Dr. Chen  3.  PVCs, we'll check a stress echocardiogram to assess for residual pulmonary hypertension and coronary artery disease with frequent PVCs.  Magnesium and potassium levels were normal  4.  Pulmonary hypertension, as above  5.  Diabetes  6.  Dyslipidemia       Maite Pacheco   Home Medication Instructions ALEN:    Printed on:10/09/17 3241   Medication Information                      ANORO ELLIPTA 62.5-25 MCG/INH aerosol powder                atorvastatin (LIPITOR) 10 MG tablet  Take 1 tablet by mouth  Daily.             Calcium Carbonate-Vitamin D (CALCIUM + D PO)  Take 2 tablets by mouth Daily. 500 IU of Vit D 630mg of Calcium             cholecalciferol (VITAMIN D3) 1000 UNITS tablet  Take 1,000 Units by mouth Daily. Takes takes from October to May             metFORMIN ER (GLUCOPHAGE-XR) 500 MG 24 hr tablet  Take 1 tablet by mouth 2 (Two) Times a Day.             MULTIPLE VITAMINS PO  Take 1 tablet by mouth Daily.                 Dictated utilizing Dragon dictation

## 2017-10-12 ENCOUNTER — HOSPITAL ENCOUNTER (OUTPATIENT)
Dept: CARDIOLOGY | Facility: HOSPITAL | Age: 82
Discharge: HOME | End: 2017-10-12
Attending: INTERNAL MEDICINE | Admitting: INTERNAL MEDICINE

## 2017-10-12 ENCOUNTER — TELEPHONE (OUTPATIENT)
Dept: CARDIOLOGY | Facility: CLINIC | Age: 82
End: 2017-10-12

## 2017-10-12 VITALS
HEART RATE: 94 BPM | BODY MASS INDEX: 17.42 KG/M2 | WEIGHT: 102 LBS | DIASTOLIC BLOOD PRESSURE: 70 MMHG | SYSTOLIC BLOOD PRESSURE: 128 MMHG | HEIGHT: 64 IN

## 2017-10-12 DIAGNOSIS — I27.20 PULMONARY HYPERTENSION (HCC): ICD-10-CM

## 2017-10-12 DIAGNOSIS — R94.39 ABNORMAL STRESS ECHO: Primary | ICD-10-CM

## 2017-10-12 DIAGNOSIS — R94.31 ABNORMAL EKG: ICD-10-CM

## 2017-10-12 DIAGNOSIS — I49.3 PVC (PREMATURE VENTRICULAR CONTRACTION): ICD-10-CM

## 2017-10-12 LAB
ASCENDING AORTA: 2.8 CM
BH CV ECHO MEAS - ACS: 1.8 CM
BH CV ECHO MEAS - AO MAX PG: 7.4 MMHG
BH CV ECHO MEAS - AO ROOT AREA (BSA CORRECTED): 2.1
BH CV ECHO MEAS - AO ROOT AREA: 7.1 CM^2
BH CV ECHO MEAS - AO ROOT DIAM: 3 CM
BH CV ECHO MEAS - AO V2 MAX: 135.7 CM/SEC
BH CV ECHO MEAS - BSA(HAYCOCK): 1.4 M^2
BH CV ECHO MEAS - BSA: 1.5 M^2
BH CV ECHO MEAS - BZI_BMI: 17.5 KILOGRAMS/M^2
BH CV ECHO MEAS - BZI_METRIC_HEIGHT: 162.6 CM
BH CV ECHO MEAS - BZI_METRIC_WEIGHT: 46.3 KG
BH CV ECHO MEAS - CONTRAST EF 4CH: 58.3 ML/M^2
BH CV ECHO MEAS - EDV(MOD-SP4): 72 ML
BH CV ECHO MEAS - EDV(TEICH): 95.9 ML
BH CV ECHO MEAS - EF(CUBED): 64.8 %
BH CV ECHO MEAS - EF(MOD-SP4): 58.3 %
BH CV ECHO MEAS - EF(TEICH): 56.4 %
BH CV ECHO MEAS - ESV(MOD-SP4): 30 ML
BH CV ECHO MEAS - ESV(TEICH): 41.8 ML
BH CV ECHO MEAS - FS: 29.4 %
BH CV ECHO MEAS - IVS/LVPW: 1.1
BH CV ECHO MEAS - IVSD: 1 CM
BH CV ECHO MEAS - LAT PEAK E' VEL: 8 CM/SEC
BH CV ECHO MEAS - LV DIASTOLIC VOL/BSA (35-75): 49 ML/M^2
BH CV ECHO MEAS - LV MASS(C)D: 156.9 GRAMS
BH CV ECHO MEAS - LV MASS(C)DI: 106.8 GRAMS/M^2
BH CV ECHO MEAS - LV SYSTOLIC VOL/BSA (12-30): 20.4 ML/M^2
BH CV ECHO MEAS - LVIDD: 4.6 CM
BH CV ECHO MEAS - LVIDS: 3.2 CM
BH CV ECHO MEAS - LVLD AP4: 6 CM
BH CV ECHO MEAS - LVLS AP4: 5.4 CM
BH CV ECHO MEAS - LVPWD: 0.96 CM
BH CV ECHO MEAS - MED PEAK E' VEL: 7 CM/SEC
BH CV ECHO MEAS - MR MAX PG: 64.1 MMHG
BH CV ECHO MEAS - MR MAX VEL: 400.4 CM/SEC
BH CV ECHO MEAS - MV A DUR: 0.11 SEC
BH CV ECHO MEAS - MV A MAX VEL: 89.7 CM/SEC
BH CV ECHO MEAS - MV DEC SLOPE: 225.1 CM/SEC^2
BH CV ECHO MEAS - MV DEC TIME: 0.24 SEC
BH CV ECHO MEAS - MV E MAX VEL: 53.4 CM/SEC
BH CV ECHO MEAS - MV E/A: 0.59
BH CV ECHO MEAS - MV P1/2T MAX VEL: 54.8 CM/SEC
BH CV ECHO MEAS - MV P1/2T: 71.3 MSEC
BH CV ECHO MEAS - MVA P1/2T LCG: 4 CM^2
BH CV ECHO MEAS - MVA(P1/2T): 3.1 CM^2
BH CV ECHO MEAS - PULM A REVS DUR: 0.09 SEC
BH CV ECHO MEAS - PULM A REVS VEL: 61.6 CM/SEC
BH CV ECHO MEAS - PULM DIAS VEL: 36.9 CM/SEC
BH CV ECHO MEAS - PULM S/D: 1.7
BH CV ECHO MEAS - PULM SYS VEL: 61.1 CM/SEC
BH CV ECHO MEAS - SI(CUBED): 42.1 ML/M^2
BH CV ECHO MEAS - SI(MOD-SP4): 28.6 ML/M^2
BH CV ECHO MEAS - SI(TEICH): 36.8 ML/M^2
BH CV ECHO MEAS - SV(CUBED): 61.9 ML
BH CV ECHO MEAS - SV(MOD-SP4): 42 ML
BH CV ECHO MEAS - SV(TEICH): 54.1 ML
BH CV ECHO MEAS - TAPSE (>1.6): 2.5 CM2
BH CV ECHO MEAS - TR MAX VEL: 262 CM/SEC
BH CV STRESS BP STAGE 1: NORMAL
BH CV STRESS DURATION MIN STAGE 1: 2
BH CV STRESS DURATION SEC STAGE 1: 32
BH CV STRESS ECHO POST STRESS EJECTION FRACTION EF: 43 %
BH CV STRESS GRADE STAGE 1: 10
BH CV STRESS HR STAGE 1: 149
BH CV STRESS METS STAGE 1: 5
BH CV STRESS O2 STAGE 1: 88
BH CV STRESS PROTOCOL 1: NORMAL
BH CV STRESS RECOVERY O2: 92 %
BH CV STRESS SPEED STAGE 1: 1.7
BH CV STRESS STAGE 1: 1
BH CV XLRA - RV BASE: 2.8 CM
BH CV XLRA - TDI S': 13 CM/SEC
E/E' RATIO: 7.5
LEFT ATRIUM VOLUME INDEX: 21 ML/M2
LV EF 2D ECHO EST: 58 %
MAXIMAL PREDICTED HEART RATE: 140 BPM
PERCENT MAX PREDICTED HR: 106.43 %
SINUS: 2.5 CM
STJ: 2.5 CM
STRESS BASELINE BP: NORMAL MMHG
STRESS BASELINE HR: 94 BPM
STRESS PERCENT HR: 125 %
STRESS POST O2 SAT PEAK: 88 %
STRESS POST PEAK BP: NORMAL MMHG
STRESS POST PEAK HR: 149 BPM
STRESS TARGET HR: 119 BPM

## 2017-10-12 PROCEDURE — 93016 CV STRESS TEST SUPVJ ONLY: CPT | Performed by: INTERNAL MEDICINE

## 2017-10-12 PROCEDURE — 93320 DOPPLER ECHO COMPLETE: CPT | Performed by: INTERNAL MEDICINE

## 2017-10-12 PROCEDURE — 93018 CV STRESS TEST I&R ONLY: CPT | Performed by: INTERNAL MEDICINE

## 2017-10-12 PROCEDURE — 93017 CV STRESS TEST TRACING ONLY: CPT

## 2017-10-12 PROCEDURE — 93325 DOPPLER ECHO COLOR FLOW MAPG: CPT

## 2017-10-12 PROCEDURE — 93325 DOPPLER ECHO COLOR FLOW MAPG: CPT | Performed by: INTERNAL MEDICINE

## 2017-10-12 PROCEDURE — 93350 STRESS TTE ONLY: CPT

## 2017-10-12 PROCEDURE — 93350 STRESS TTE ONLY: CPT | Performed by: INTERNAL MEDICINE

## 2017-10-12 PROCEDURE — 93320 DOPPLER ECHO COMPLETE: CPT

## 2017-10-12 RX ORDER — METFORMIN HYDROCHLORIDE 500 MG/1
TABLET, EXTENDED RELEASE ORAL
Qty: 180 TABLET | Refills: 1 | Status: ON HOLD | OUTPATIENT
Start: 2017-10-12 | End: 2017-10-25

## 2017-10-12 NOTE — TELEPHONE ENCOUNTER
Rest test results reviewed with the patient.  Commended she proceed with cardiac catheterization.  The risks and benefits of cardiac catheterization including risk of myocardial infarction bleeding, stroke were discussed with the patient she understands and wishes to proceed.  She has had troubles taking aspirin due to tinnitus. sharron

## 2017-10-23 ENCOUNTER — LAB (OUTPATIENT)
Dept: LAB | Facility: HOSPITAL | Age: 82
End: 2017-10-23
Attending: INTERNAL MEDICINE

## 2017-10-23 ENCOUNTER — TRANSCRIBE ORDERS (OUTPATIENT)
Dept: ADMINISTRATIVE | Facility: HOSPITAL | Age: 82
End: 2017-10-23

## 2017-10-23 DIAGNOSIS — Z01.810 PRE-OPERATIVE CARDIOVASCULAR EXAMINATION: Primary | ICD-10-CM

## 2017-10-23 DIAGNOSIS — R94.39 ABNORMAL BALLISTOCARDIOGRAM: ICD-10-CM

## 2017-10-23 DIAGNOSIS — Z13.6 SCREENING FOR ISCHEMIC HEART DISEASE: ICD-10-CM

## 2017-10-23 DIAGNOSIS — Z01.810 PRE-OPERATIVE CARDIOVASCULAR EXAMINATION: ICD-10-CM

## 2017-10-23 LAB
ANION GAP SERPL CALCULATED.3IONS-SCNC: 12.8 MMOL/L
BASOPHILS # BLD AUTO: 0 10*3/MM3 (ref 0–0.2)
BASOPHILS NFR BLD AUTO: 0 % (ref 0–1.5)
BUN BLD-MCNC: 17 MG/DL (ref 8–23)
BUN/CREAT SERPL: 27.9 (ref 7–25)
CALCIUM SPEC-SCNC: 10.5 MG/DL (ref 8.6–10.5)
CHLORIDE SERPL-SCNC: 101 MMOL/L (ref 98–107)
CO2 SERPL-SCNC: 29.2 MMOL/L (ref 22–29)
CREAT BLD-MCNC: 0.61 MG/DL (ref 0.57–1)
DEPRECATED RDW RBC AUTO: 45.6 FL (ref 37–54)
EOSINOPHIL # BLD AUTO: 0.16 10*3/MM3 (ref 0–0.7)
EOSINOPHIL NFR BLD AUTO: 2.4 % (ref 0.3–6.2)
ERYTHROCYTE [DISTWIDTH] IN BLOOD BY AUTOMATED COUNT: 14.1 % (ref 11.7–13)
GFR SERPL CREATININE-BSD FRML MDRD: 94 ML/MIN/1.73
GLUCOSE BLD-MCNC: 118 MG/DL (ref 65–99)
HCT VFR BLD AUTO: 40.5 % (ref 35.6–45.5)
HGB BLD-MCNC: 13.1 G/DL (ref 11.9–15.5)
IMM GRANULOCYTES # BLD: 0 10*3/MM3 (ref 0–0.03)
IMM GRANULOCYTES NFR BLD: 0 % (ref 0–0.5)
LYMPHOCYTES # BLD AUTO: 1.61 10*3/MM3 (ref 0.9–4.8)
LYMPHOCYTES NFR BLD AUTO: 24.6 % (ref 19.6–45.3)
MCH RBC QN AUTO: 28.7 PG (ref 26.9–32)
MCHC RBC AUTO-ENTMCNC: 32.3 G/DL (ref 32.4–36.3)
MCV RBC AUTO: 88.6 FL (ref 80.5–98.2)
MONOCYTES # BLD AUTO: 0.45 10*3/MM3 (ref 0.2–1.2)
MONOCYTES NFR BLD AUTO: 6.9 % (ref 5–12)
NEUTROPHILS # BLD AUTO: 4.33 10*3/MM3 (ref 1.9–8.1)
NEUTROPHILS NFR BLD AUTO: 66.1 % (ref 42.7–76)
PLATELET # BLD AUTO: 216 10*3/MM3 (ref 140–500)
PMV BLD AUTO: 9.4 FL (ref 6–12)
POTASSIUM BLD-SCNC: 3.6 MMOL/L (ref 3.5–5.2)
RBC # BLD AUTO: 4.57 10*6/MM3 (ref 3.9–5.2)
SODIUM BLD-SCNC: 143 MMOL/L (ref 136–145)
WBC NRBC COR # BLD: 6.55 10*3/MM3 (ref 4.5–10.7)

## 2017-10-23 PROCEDURE — 36415 COLL VENOUS BLD VENIPUNCTURE: CPT

## 2017-10-23 PROCEDURE — 85025 COMPLETE CBC W/AUTO DIFF WBC: CPT

## 2017-10-23 PROCEDURE — 80048 BASIC METABOLIC PNL TOTAL CA: CPT

## 2017-10-25 ENCOUNTER — HOSPITAL ENCOUNTER (OUTPATIENT)
Facility: HOSPITAL | Age: 82
Setting detail: HOSPITAL OUTPATIENT SURGERY
Discharge: HOME | End: 2017-10-25
Attending: INTERNAL MEDICINE | Admitting: INTERNAL MEDICINE

## 2017-10-25 VITALS
WEIGHT: 102 LBS | HEIGHT: 62 IN | DIASTOLIC BLOOD PRESSURE: 76 MMHG | HEART RATE: 76 BPM | OXYGEN SATURATION: 92 % | RESPIRATION RATE: 16 BRPM | BODY MASS INDEX: 18.77 KG/M2 | TEMPERATURE: 98.7 F | SYSTOLIC BLOOD PRESSURE: 136 MMHG

## 2017-10-25 DIAGNOSIS — R94.39 ABNORMAL STRESS ECHO: ICD-10-CM

## 2017-10-25 LAB — GLUCOSE BLDC GLUCOMTR-MCNC: 144 MG/DL (ref 70–130)

## 2017-10-25 PROCEDURE — 82962 GLUCOSE BLOOD TEST: CPT

## 2017-10-25 PROCEDURE — 25010000002 HEPARIN (PORCINE) PER 1000 UNITS: Performed by: INTERNAL MEDICINE

## 2017-10-25 PROCEDURE — 25010000002 FENTANYL CITRATE (PF) 100 MCG/2ML SOLUTION: Performed by: INTERNAL MEDICINE

## 2017-10-25 PROCEDURE — C1894 INTRO/SHEATH, NON-LASER: HCPCS | Performed by: INTERNAL MEDICINE

## 2017-10-25 PROCEDURE — 0 IOPAMIDOL PER 1 ML: Performed by: INTERNAL MEDICINE

## 2017-10-25 PROCEDURE — 93458 L HRT ARTERY/VENTRICLE ANGIO: CPT | Performed by: INTERNAL MEDICINE

## 2017-10-25 PROCEDURE — C1769 GUIDE WIRE: HCPCS | Performed by: INTERNAL MEDICINE

## 2017-10-25 PROCEDURE — 25010000002 MIDAZOLAM PER 1 MG: Performed by: INTERNAL MEDICINE

## 2017-10-25 PROCEDURE — 99152 MOD SED SAME PHYS/QHP 5/>YRS: CPT | Performed by: INTERNAL MEDICINE

## 2017-10-25 RX ORDER — PHENOL 1.4 %
1200 AEROSOL, SPRAY (ML) MUCOUS MEMBRANE DAILY
COMMUNITY

## 2017-10-25 RX ORDER — HYDROCODONE BITARTRATE AND ACETAMINOPHEN 5; 325 MG/1; MG/1
1 TABLET ORAL EVERY 4 HOURS PRN
Status: DISCONTINUED | OUTPATIENT
Start: 2017-10-25 | End: 2017-10-25 | Stop reason: HOSPADM

## 2017-10-25 RX ORDER — ATORVASTATIN CALCIUM 10 MG/1
10 TABLET, FILM COATED ORAL DAILY
COMMUNITY
End: 2018-10-08 | Stop reason: SDUPTHER

## 2017-10-25 RX ORDER — FENTANYL CITRATE 50 UG/ML
INJECTION, SOLUTION INTRAMUSCULAR; INTRAVENOUS AS NEEDED
Status: DISCONTINUED | OUTPATIENT
Start: 2017-10-25 | End: 2017-10-25 | Stop reason: HOSPADM

## 2017-10-25 RX ORDER — SODIUM CHLORIDE 9 MG/ML
75 INJECTION, SOLUTION INTRAVENOUS CONTINUOUS
Status: DISCONTINUED | OUTPATIENT
Start: 2017-10-25 | End: 2017-10-25 | Stop reason: HOSPADM

## 2017-10-25 RX ORDER — NALOXONE HCL 0.4 MG/ML
0.4 VIAL (ML) INJECTION
Status: DISCONTINUED | OUTPATIENT
Start: 2017-10-25 | End: 2017-10-25 | Stop reason: HOSPADM

## 2017-10-25 RX ORDER — LIDOCAINE HYDROCHLORIDE 10 MG/ML
0.1 INJECTION, SOLUTION EPIDURAL; INFILTRATION; INTRACAUDAL; PERINEURAL ONCE AS NEEDED
Status: DISCONTINUED | OUTPATIENT
Start: 2017-10-25 | End: 2017-10-25 | Stop reason: HOSPADM

## 2017-10-25 RX ORDER — SODIUM CHLORIDE 0.9 % (FLUSH) 0.9 %
1-10 SYRINGE (ML) INJECTION AS NEEDED
Status: DISCONTINUED | OUTPATIENT
Start: 2017-10-25 | End: 2017-10-25 | Stop reason: HOSPADM

## 2017-10-25 RX ORDER — LIDOCAINE HYDROCHLORIDE 20 MG/ML
INJECTION, SOLUTION INFILTRATION; PERINEURAL AS NEEDED
Status: DISCONTINUED | OUTPATIENT
Start: 2017-10-25 | End: 2017-10-25 | Stop reason: HOSPADM

## 2017-10-25 RX ORDER — MIDAZOLAM HYDROCHLORIDE 1 MG/ML
INJECTION INTRAMUSCULAR; INTRAVENOUS AS NEEDED
Status: DISCONTINUED | OUTPATIENT
Start: 2017-10-25 | End: 2017-10-25 | Stop reason: HOSPADM

## 2017-10-25 RX ADMIN — SODIUM CHLORIDE 75 ML/HR: 9 INJECTION, SOLUTION INTRAVENOUS at 07:58

## 2017-10-25 NOTE — H&P (VIEW-ONLY)
Date of Office Visit: 10/04/2017  Encounter Provider: Carolin Lantigua MD  Place of Service: [facility] CARDIOLOGY  Patient Name: Maite Pacheco  :1935    Chief complaint  Follow-up of paroxysmal supraventricular tachycardia, ulnar hypertension, PVCs    History of Present Illness  Patient is a pleasant 82-year-old female with history of diabetes hyperlipidemia who was admitted on  with pneumonia with acute respiratory failure hypoxia sepsis and left-sided rib pain from a loculated effusion..  In this setting she developed paroxysmal supraventricular tachycardia that resolved with IV Lopressor and then switch to oral metoprolol.  Due to pulmonary wheeze she was transitioned to diltiazem and sent to rehabilitation on this regimen.  An echocardiogram revealed normal systolic function with severe pulmonary hypertension with an RV systolic pressure 66 mmHg without significant valvular heart disease.    Since last visit she feels back to her usual self just chronic baseline dyspnea on exertion is unchanged.  She has mild dependent edema she denies any chest pain syncope near syncope or palpitations.  She has not been checking her blood pressure regularly.  Get a follow up CT scan performed by Dr. Chen with some improvement in pleural effusion.  A repeat CT is planned for next year    Past Medical History:   Diagnosis Date   • Abnormal CT of the abdomen    • Allergic rhinitis    • Arthritis    • Chronic pancreatitis    • Diabetes mellitus     type 2   • Eustachian tube dysfunction    • History of pneumonia 2016   • Hyperglycemia    • Hyperlipidemia    • Inguinal hernia    • Neck pain    • Osteoporosis    • Pancreatic cyst    • Urinary frequency      Past Surgical History:   Procedure Laterality Date   • APPENDECTOMY N/A 1943   • COLONOSCOPY N/A 2003    Colonoscopy to the cecum, terminal ileoscopy, normal-Dr. Zahraa Brito    • INGUINAL HERNIA REPAIR Left 2008     Open left inguinal hernia repair with mesh-Dr. Zahraa Brito      Outpatient Medications Prior to Visit   Medication Sig Dispense Refill   • ANORO ELLIPTA 62.5-25 MCG/INH aerosol powder       • atorvastatin (LIPITOR) 10 MG tablet Take 1 tablet by mouth Daily. 90 tablet 3   • Calcium Carbonate-Vitamin D (CALCIUM + D PO) Take 2 tablets by mouth Daily. 500 IU of Vit D 630mg of Calcium     • cholecalciferol (VITAMIN D3) 1000 UNITS tablet Take 1,000 Units by mouth Daily. Takes takes from October to May     • metFORMIN ER (GLUCOPHAGE-XR) 500 MG 24 hr tablet Take 1 tablet by mouth 2 (Two) Times a Day. 180 tablet 1   • MULTIPLE VITAMINS PO Take 1 tablet by mouth Daily.       No facility-administered medications prior to visit.      Allergies as of 10/04/2017 - Sin as Reviewed 10/04/2017   Allergen Reaction Noted   • Sulfa antibiotics Rash 04/05/2016     Social History     Social History   • Marital status: Single     Spouse name: N/A   • Number of children: 0   • Years of education: COLLEGE GRADUATE     Occupational History   • RETIRED Retired     Social History Main Topics   • Smoking status: Former Smoker     Packs/day: 1.00     Years: 50.00     Types: Cigarettes     Quit date: 12/30/2016   • Smokeless tobacco: Never Used   • Alcohol use No   • Drug use: No   • Sexual activity: Defer     Other Topics Concern   • Not on file     Social History Narrative     Family History   Problem Relation Age of Onset   • Stroke Mother    • Heart disease Mother      Review of Systems   Constitution: Negative for fever, malaise/fatigue, weight gain and weight loss.   HENT: Negative for ear pain, hearing loss, nosebleeds and sore throat.    Eyes: Negative for double vision, pain, vision loss in left eye and vision loss in right eye.   Cardiovascular: Positive for leg swelling.        See history of present illness.   Respiratory: Positive for shortness of breath. Negative for cough, sleep disturbances due to breathing, snoring and  "wheezing.    Endocrine: Negative for cold intolerance, heat intolerance and polyuria.   Skin: Negative for itching, poor wound healing and rash.   Musculoskeletal: Negative for joint pain, joint swelling and myalgias.   Gastrointestinal: Negative for abdominal pain, diarrhea, hematochezia, nausea and vomiting.   Genitourinary: Negative for hematuria and hesitancy.   Neurological: Negative for numbness, paresthesias and seizures.   Psychiatric/Behavioral: Negative for depression. The patient is not nervous/anxious.      Objective:     Vitals:    10/04/17 1212   BP: 146/80   Pulse: 71   Weight: 102 lb (46.3 kg)   Height: 64\" (162.6 cm)     Body mass index is 17.51 kg/(m^2).    Physical Exam   Constitutional: She is oriented to person, place, and time. She appears well-developed and well-nourished.   HENT:   Head: Normocephalic.   Nose: Nose normal.   Mouth/Throat: Oropharynx is clear and moist.   Eyes: Conjunctivae and EOM are normal. Pupils are equal, round, and reactive to light. Right eye exhibits no discharge. No scleral icterus.   Neck: Normal range of motion. Neck supple. No JVD present. No thyromegaly present.   Cardiovascular: Normal rate, regular rhythm, normal heart sounds and intact distal pulses.  Exam reveals no gallop and no friction rub.    No murmur heard.  Pulses:       Carotid pulses are 2+ on the right side, and 2+ on the left side.       Radial pulses are 2+ on the right side, and 2+ on the left side.        Femoral pulses are 2+ on the right side, and 2+ on the left side.       Popliteal pulses are 2+ on the right side, and 2+ on the left side.        Dorsalis pedis pulses are 2+ on the right side, and 2+ on the left side.        Posterior tibial pulses are 2+ on the right side, and 2+ on the left side.   Pulmonary/Chest: Effort normal and breath sounds normal. No respiratory distress. She has no wheezes. She has no rales.   Abdominal: Soft. Bowel sounds are normal. She exhibits no distension. " There is no hepatosplenomegaly. There is no tenderness. There is no rebound.   Musculoskeletal: Normal range of motion. She exhibits no edema or tenderness.   Neurological: She is alert and oriented to person, place, and time.   Skin: Skin is warm and dry. No rash noted. No erythema.   Psychiatric: She has a normal mood and affect. Her behavior is normal. Judgment and thought content normal.   Vitals reviewed.    Lab Review:     ECG 12 Lead  Date/Time: 10/4/2017 1:29 AM  Performed by: MARIEL BURR  Authorized by: MARIEL BURR   Comparison: compared with previous ECG   Similar to previous ECG  Rhythm: sinus rhythm  Conduction comments: QTc = 422 msec  Clinical impression: abnormal ECG          Assessment:       Diagnosis Plan   1. Abnormal EKG  Adult Stress Echo W/ Cont or Stress Agent if Necessary Per Protocol   2. Pulmonary hypertension  Adult Stress Echo W/ Cont or Stress Agent if Necessary Per Protocol   3. PVC (premature ventricular contraction)  Adult Stress Echo W/ Cont or Stress Agent if Necessary Per Protocol   4. Type 2 diabetes mellitus with other circulatory complication, without long-term current use of insulin       Plan:       1.  Paroxysmal supra-ventricular tachycardia.  Stable at this time.  I've asked her to check her blood pressure more consistently at home and if elevated we'll add low-dose beta blocker therapy  2.  Persistent left pleural effusion, clinically much improved, followed by Dr. Chen  3.  PVCs, we'll check a stress echocardiogram to assess for residual pulmonary hypertension and coronary artery disease with frequent PVCs.  Magnesium and potassium levels were normal  4.  Pulmonary hypertension, as above  5.  Diabetes  6.  Dyslipidemia       Maite Pacheco   Home Medication Instructions ALEN:    Printed on:10/09/17 9862   Medication Information                      ANORO ELLIPTA 62.5-25 MCG/INH aerosol powder                atorvastatin (LIPITOR) 10 MG tablet  Take 1 tablet by mouth  Daily.             Calcium Carbonate-Vitamin D (CALCIUM + D PO)  Take 2 tablets by mouth Daily. 500 IU of Vit D 630mg of Calcium             cholecalciferol (VITAMIN D3) 1000 UNITS tablet  Take 1,000 Units by mouth Daily. Takes takes from October to May             metFORMIN ER (GLUCOPHAGE-XR) 500 MG 24 hr tablet  Take 1 tablet by mouth 2 (Two) Times a Day.             MULTIPLE VITAMINS PO  Take 1 tablet by mouth Daily.                 Dictated utilizing Dragon dictation

## 2017-10-25 NOTE — PLAN OF CARE
Problem: Patient Care Overview (Adult)  Goal: Plan of Care Review  Outcome: Outcome(s) achieved Date Met:  10/25/17    10/25/17 1050   Coping/Psychosocial Response Interventions   Plan Of Care Reviewed With patient;friend   Patient Care Overview   Progress improving   Outcome Evaluation   Outcome Summary/Follow up Plan READY FOR DISCHARGE       Goal: Adult Individualization and Mutuality  Outcome: Outcome(s) achieved Date Met:  10/25/17  Goal: Discharge Needs Assessment  Outcome: Outcome(s) achieved Date Met:  10/25/17    Problem: Cardiac Catheterization with/without PCI (Adult)  Goal: Signs and Symptoms of Listed Potential Problems Will be Absent or Manageable (Cardiac Catheterization with/without PCI)  Outcome: Outcome(s) achieved Date Met:  10/25/17

## 2017-10-25 NOTE — PERIOPERATIVE NURSING NOTE
Bleeding precautions and activity restrictions reviewed with patient, verbalizes and demonstrate understanding.

## 2017-10-25 NOTE — DISCHARGE INSTRUCTIONS
[facility]  [address] [place] [address]    Coronary Angiogram (Radial/Ulnar Approach) After Care    Refer to this sheet in the next few weeks. These instructions provide you with information on caring for yourself after your procedure. Your caregiver may also give you more specific instructions. Your treatment has been planned according to current medical practices, but problems sometimes occur. Call your caregiver if you have any problems or questions after your procedure.    Home Care Instructions:  · You may shower the day after the procedure. Remove the bandage (dressing) and gently wash the site with plain soap and water. Gently pat the site dry. You may apply a band aid daily for 2 days if desired.    · Do not apply powder or lotion to the site.  · Do not submerge the affected site in water for 3 to 5 days or until the site is completely healed.   · Do not lift, push or pull anything over 10 pounds for 2 days after your procedure.  · Inspect the site at least twice daily. You may notice some bruising at the site and it may be tender for 1 to 2 weeks.     · Increase your fluid intake for the next 2 days.    · Keep arm elevated for 24 hours. For the remainder of the day, keep your arm in “Pledge of Allegiance” position when up and about.     · You may drive 24 hours after the procedure unless otherwise instructed by your caregiver.  · Do not operate machinery or power tools for 24 hours.  · A responsible adult should be with you for the first 24 hours after you arrive home. Do not make any important legal decisions or sign legal papers for 24 hours.      Call Your Doctor if:   · You have unusual pain at the radial/ulnar (wrist) site.  · You have redness, warmth, swelling, or pain at the radial/ulnar (wrist) site.  · You have drainage (other than a small amount of blood on the dressing).  · You have chills or a fever > 101.  · Your arm becomes pale or dark, cool, tingly, or numb.  · You  have heavy bleeding from the site, hold pressure on the site for 20 minutes.  If the bleeding stops, apply a fresh bandage and call your cardiologist.  However, if you continue to have bleeding, call 911.        Outpatient Surgery Guidelines, Adult  Outpatient procedures are those for which the person having the procedure is allowed to go home the same day as the procedure. Various procedures are done on an outpatient basis. You should follow some general guidelines if you will be having an outpatient procedure.  AFTER THE  PROCEDURE  After surgery, you will be taken to a recovery area, where your progress will be monitored. If there are no complications, you will be allowed to go home when you are awake, stable, and taking fluids well. You may have numbness around the surgical site. Healing will take some time. You will have tenderness at the surgical site and may have some swelling and bruising. You may also have some nausea.  HOME CARE INSTRUCTIONS  · Do not drive for 24 hours, or as directed by your health care provider. Do not drive while taking prescription pain medicines.  · Do not drink alcohol for 24 hours.  · Do not make important decisions or sign legal documents for 24 hours.  · Plan on having a responsible adult stay with your for 24 hours following your procedure.  · You may resume a normal diet and activities as directed.  · Do not lift anything heavier than 10 pounds (4.5 kg) or play contact sports until your health care provider says it is okay.  · Only take over-the-counter or prescription medicines as directed by your health care provider.  · Follow up with your health care provider as directed.  SEEK MEDICAL CARE IF:  · You have increased bleeding (more than a small spot) from the surgical site.  · You have redness, swelling, or increasing pain in the wound.  · You see pus coming from the wound.  · You have a fever > 101.  · You notice a bad smell coming from the wound or dressing.  · You feel  lightheaded or faint.  · You develop a rash.  · You have trouble breathing.  · You develop allergies.  MAKE SURE YOU:  · Understand these instructions.  · Will watch your condition.  · Will get help right away if you are not doing well or get worse.

## 2017-11-07 ENCOUNTER — TRANSCRIBE ORDERS (OUTPATIENT)
Dept: ADMINISTRATIVE | Facility: HOSPITAL | Age: 82
End: 2017-11-07

## 2017-11-07 DIAGNOSIS — Z13.9 SCREENING FOR CONDITION: Primary | ICD-10-CM

## 2017-11-16 ENCOUNTER — HOSPITAL ENCOUNTER (OUTPATIENT)
Dept: CARDIOLOGY | Facility: HOSPITAL | Age: 82
Discharge: HOME | End: 2017-11-16

## 2017-11-16 VITALS
DIASTOLIC BLOOD PRESSURE: 72 MMHG | BODY MASS INDEX: 18.07 KG/M2 | SYSTOLIC BLOOD PRESSURE: 144 MMHG | WEIGHT: 102 LBS | HEART RATE: 68 BPM | HEIGHT: 63 IN

## 2017-11-16 DIAGNOSIS — Z13.9 SCREENING FOR CONDITION: ICD-10-CM

## 2017-11-16 LAB
BH CV VAS BP LEFT ARM: NORMAL MMHG
BH CV VAS BP RIGHT ARM: NORMAL MMHG
BH CV XLRA MEAS LEFT ICA/CCA RATIO: 1.04
BH CV XLRA MEAS LEFT MID CCA PSV: NORMAL CM/SEC
BH CV XLRA MEAS LEFT MID ICA PSV: NORMAL CM/SEC
BH CV XLRA MEAS LEFT PROX ECA PSV: NORMAL CM/SEC
BH CV XLRA MEAS RIGHT ICA/CCA RATIO: 1.26
BH CV XLRA MEAS RIGHT MID CCA PSV: NORMAL CM/SEC
BH CV XLRA MEAS RIGHT MID ICA PSV: NORMAL CM/SEC
BH CV XLRA MEAS RIGHT PROX ECA PSV: NORMAL CM/SEC

## 2017-11-16 PROCEDURE — 93799 UNLISTED CV SVC/PROCEDURE: CPT

## 2017-11-22 ENCOUNTER — TELEPHONE (OUTPATIENT)
Dept: FAMILY MEDICINE CLINIC | Facility: CLINIC | Age: 82
End: 2017-11-22

## 2017-11-22 NOTE — TELEPHONE ENCOUNTER
Left voicemail letting patient know    ----- Message from Tong Berg MD sent at 11/17/2017 11:18 AM EST -----  Tell patient that she has minimal plaque in her carotid arteries.  She should repeat her vascular screening in 2 years

## 2017-11-27 ENCOUNTER — CLINICAL SUPPORT (OUTPATIENT)
Dept: FAMILY MEDICINE CLINIC | Facility: CLINIC | Age: 82
End: 2017-11-27

## 2017-11-27 DIAGNOSIS — Z23 NEED FOR VACCINATION: Primary | ICD-10-CM

## 2017-11-27 PROCEDURE — 90471 IMMUNIZATION ADMIN: CPT

## 2017-11-27 PROCEDURE — 90714 TD VACC NO PRESV 7 YRS+ IM: CPT

## 2017-12-12 DIAGNOSIS — I10 ESSENTIAL HYPERTENSION: Primary | ICD-10-CM

## 2017-12-12 DIAGNOSIS — E78.5 HYPERLIPIDEMIA, UNSPECIFIED HYPERLIPIDEMIA TYPE: ICD-10-CM

## 2017-12-12 DIAGNOSIS — E11.9 TYPE 2 DIABETES MELLITUS WITHOUT COMPLICATION, UNSPECIFIED LONG TERM INSULIN USE STATUS: ICD-10-CM

## 2017-12-13 ENCOUNTER — TRANSCRIBE ORDERS (OUTPATIENT)
Dept: ADMINISTRATIVE | Facility: HOSPITAL | Age: 82
End: 2017-12-13

## 2017-12-13 DIAGNOSIS — R91.1 LUNG NODULE: Primary | ICD-10-CM

## 2017-12-15 LAB
ALBUMIN SERPL-MCNC: 4.3 G/DL (ref 3.5–5.2)
ALBUMIN/GLOB SERPL: 1.4 G/DL
ALP SERPL-CCNC: 84 U/L (ref 39–117)
ALT SERPL-CCNC: 61 U/L (ref 1–33)
AST SERPL-CCNC: 62 U/L (ref 1–32)
BILIRUB SERPL-MCNC: 0.5 MG/DL (ref 0.1–1.2)
BUN SERPL-MCNC: 20 MG/DL (ref 8–23)
BUN/CREAT SERPL: 30.3 (ref 7–25)
CALCIUM SERPL-MCNC: 9.4 MG/DL (ref 8.6–10.5)
CHLORIDE SERPL-SCNC: 102 MMOL/L (ref 98–107)
CHOLEST SERPL-MCNC: 125 MG/DL (ref 0–200)
CO2 SERPL-SCNC: 26.4 MMOL/L (ref 22–29)
CREAT SERPL-MCNC: 0.66 MG/DL (ref 0.57–1)
GFR SERPLBLD CREATININE-BSD FMLA CKD-EPI: 104 ML/MIN/1.73
GFR SERPLBLD CREATININE-BSD FMLA CKD-EPI: 86 ML/MIN/1.73
GLOBULIN SER CALC-MCNC: 3 GM/DL
GLUCOSE SERPL-MCNC: 135 MG/DL (ref 65–99)
HBA1C MFR BLD: 6.88 % (ref 4.8–5.6)
HDLC SERPL-MCNC: 79 MG/DL (ref 40–60)
LDLC SERPL CALC-MCNC: 34 MG/DL (ref 0–100)
LDLC/HDLC SERPL: 0.43 {RATIO}
POTASSIUM SERPL-SCNC: 3.8 MMOL/L (ref 3.5–5.2)
PROT SERPL-MCNC: 7.3 G/DL (ref 6–8.5)
SODIUM SERPL-SCNC: 143 MMOL/L (ref 136–145)
TRIGL SERPL-MCNC: 59 MG/DL (ref 0–150)
VLDLC SERPL CALC-MCNC: 11.8 MG/DL (ref 5–40)

## 2017-12-21 ENCOUNTER — OFFICE VISIT (OUTPATIENT)
Dept: FAMILY MEDICINE CLINIC | Facility: CLINIC | Age: 82
End: 2017-12-21

## 2017-12-21 VITALS
SYSTOLIC BLOOD PRESSURE: 130 MMHG | HEIGHT: 63 IN | HEART RATE: 91 BPM | WEIGHT: 104.2 LBS | OXYGEN SATURATION: 94 % | BODY MASS INDEX: 18.46 KG/M2 | DIASTOLIC BLOOD PRESSURE: 68 MMHG | TEMPERATURE: 97.4 F

## 2017-12-21 DIAGNOSIS — E78.00 PURE HYPERCHOLESTEROLEMIA: ICD-10-CM

## 2017-12-21 DIAGNOSIS — E11.59 TYPE 2 DIABETES MELLITUS WITH OTHER CIRCULATORY COMPLICATION, WITHOUT LONG-TERM CURRENT USE OF INSULIN (HCC): Primary | ICD-10-CM

## 2017-12-21 DIAGNOSIS — R79.89 ELEVATED LIVER FUNCTION TESTS: ICD-10-CM

## 2017-12-21 PROBLEM — D64.9 ANEMIA: Status: RESOLVED | Noted: 2017-02-20 | Resolved: 2017-12-21

## 2017-12-21 PROCEDURE — 99213 OFFICE O/P EST LOW 20 MIN: CPT

## 2017-12-21 NOTE — PROGRESS NOTES
Subjective   Maite Pacheco is a 82 y.o. female. Patient is here today for   Chief Complaint   Patient presents with   • Diabetes     FOLLOW UP LABS   • Hyperlipidemia   • Hypertension          Vitals:    12/21/17 0918   BP: 130/68   Pulse: 91   Temp: 97.4 °F (36.3 °C)   SpO2: 94%     The following portions of the patient's history were reviewed and updated as appropriate: allergies, current medications, past family history, past medical history, past social history, past surgical history and problem list.    Past Medical History:   Diagnosis Date   • Abnormal CT of the abdomen    • Allergic rhinitis    • Arthritis    • Chronic pancreatitis    • Diabetes mellitus     type 2   • Eustachian tube dysfunction    • History of pneumonia 12/2016   • Hyperglycemia    • Hyperlipidemia    • Inguinal hernia    • Neck pain    • Osteoporosis    • Pancreatic cyst    • Urinary frequency       Allergies   Allergen Reactions   • Sulfa Antibiotics Rash      Social History     Social History   • Marital status: Single     Spouse name: N/A   • Number of children: 0   • Years of education: COLLEGE GRADUATE     Occupational History   • RETIRED Retired     Social History Main Topics   • Smoking status: Former Smoker     Packs/day: 1.00     Years: 50.00     Types: Cigarettes     Quit date: 12/30/2016   • Smokeless tobacco: Never Used   • Alcohol use No   • Drug use: No   • Sexual activity: Defer     Other Topics Concern   • Not on file     Social History Narrative        Current Outpatient Prescriptions:   •  atorvastatin (LIPITOR) 10 MG tablet, Take 10 mg by mouth Daily., Disp: , Rfl:   •  calcium carbonate (OS-ROSAMARIA) 600 MG tablet, Take 600 mg by mouth Daily., Disp: , Rfl:   •  cholecalciferol (VITAMIN D3) 1000 UNITS tablet, Take 1,000 Units by mouth Daily. Takes takes from October to May, Disp: , Rfl:   •  metFORMIN (GLUCOPHAGE) 500 MG tablet, Take 1 tablet by mouth 2 (Two) Times a Day With Meals., Disp: , Rfl:   •  MULTIPLE VITAMINS PO,  Take 1 tablet by mouth Daily., Disp: , Rfl:   •  Umeclidinium-Vilanterol (ANORO ELLIPTA) 62.5-25 MCG/INH aerosol powder , Inhale 1 puff Daily., Disp: , Rfl:      Objective     History of Present Illness    The patient is here today for follow-up on type 2 diabetes mellitus, hyperlipidemia and COPD       Review of Systems   Constitutional: Negative.    HENT: Negative.    Respiratory: Negative for cough, shortness of breath and wheezing.    Cardiovascular: Negative for chest pain, palpitations and leg swelling.   Gastrointestinal: Negative for abdominal pain, blood in stool, constipation and diarrhea.   Genitourinary: Negative.    Musculoskeletal:        Mild aches and pains only   Neurological: Negative.    Hematological: Negative.    Psychiatric/Behavioral: Negative.        Physical Exam   Constitutional: She is oriented to person, place, and time. She appears well-developed and well-nourished.   Neck:   Carotid pulses normal   Cardiovascular: Normal rate, regular rhythm and normal heart sounds.    Pulmonary/Chest: Effort normal.   Very slightly decreased breath sounds but no wheezes or rales heard   Abdominal: Soft. Bowel sounds are normal.   Musculoskeletal:   Mild osteoarthritic changes in multiple joints   Neurological: She is alert and oriented to person, place, and time.   Skin: Skin is warm and dry.   Psychiatric: She has a normal mood and affect.   Nursing note and vitals reviewed.      ASSESSMENT  #1 type 2 diabetes mellitus             #2 hyperlipidemia           #3 COPD          #4 elevated liver tests    DISCUSSION/SUMMARY   Issues vital signs are normal.  CMP shows an elevated fasting blood sugar of 135 and the patient's hemoglobin A1c is 6.88%, in the acceptable  range.  The patient's AST and ALT were both elevated at 62 and 61, a new finding.  The patient rarely has any alcohol and has had no liver problems in the past.  Presumably her liver enzyme elevation is secondary to atorvastatin so we will  have the patient hold this medication for a month, then recheck liver enzymes.  Total cholesterol is 125, triglycerides 59, HDL cholesterol 79, and LDL cholesterol is only 34.  The patient does have COPD but is very stable on her Anoro inhaler    PLAN  Hepatic function panel, nonfasting, lab only in approximately one month.  Recheck in 6 months with fasting CMP, lipid panel and HbA1c  No Follow-up on file.

## 2018-01-08 RX ORDER — METFORMIN HYDROCHLORIDE 500 MG/1
TABLET, EXTENDED RELEASE ORAL
Qty: 180 TABLET | Refills: 2 | Status: SHIPPED | OUTPATIENT
Start: 2018-01-08 | End: 2018-10-08 | Stop reason: SDUPTHER

## 2018-01-16 DIAGNOSIS — R74.8 ELEVATED LIVER ENZYMES: Primary | ICD-10-CM

## 2018-01-18 LAB
ALBUMIN SERPL-MCNC: 4.3 G/DL (ref 3.5–5.2)
ALP SERPL-CCNC: 85 U/L (ref 39–117)
ALT SERPL-CCNC: 31 U/L (ref 1–33)
AST SERPL-CCNC: 29 U/L (ref 1–32)
BILIRUB DIRECT SERPL-MCNC: <0.2 MG/DL (ref 0–0.3)
BILIRUB SERPL-MCNC: 0.6 MG/DL (ref 0.1–1.2)
PROT SERPL-MCNC: 7.3 G/DL (ref 6–8.5)

## 2018-01-25 ENCOUNTER — TELEPHONE (OUTPATIENT)
Dept: FAMILY MEDICINE CLINIC | Facility: CLINIC | Age: 83
End: 2018-01-25

## 2018-01-25 NOTE — TELEPHONE ENCOUNTER
Patient notified.    ----- Message from Tong Berg MD sent at 1/19/2018  1:35 PM EST -----  Please tell patient that her liver enzymes are now back to normal.  What I would do is we will start her cholesterol medicine but take it only every other day until we recheck her labs again.  He probably set up a recheck with labs in about May but make sure that we have done this

## 2018-01-25 NOTE — TELEPHONE ENCOUNTER
I SPOKE WITH PATIENT AND LET HER KNOW HER LIVER ENZYMES ARE BACK TO NORMAL AND DR. VARNER WANTS HER TO GO BACK ON HER CHOLESTEROL MEDICINE 1 TABLET EVERY OTHER DAY UNTIL WE RECHECK HER LABS AGAIN. SHE IS SET UP FOR LABS THIS June.    ----- Message from Jyothi Lew sent at 1/25/2018  8:10 AM EST -----  WANTING LAB RESULTS     PLEASE CALL PT   [phone]

## 2018-03-26 ENCOUNTER — OFFICE VISIT (OUTPATIENT)
Dept: FAMILY MEDICINE CLINIC | Facility: CLINIC | Age: 83
End: 2018-03-26

## 2018-03-26 VITALS
HEIGHT: 63 IN | RESPIRATION RATE: 16 BRPM | OXYGEN SATURATION: 98 % | WEIGHT: 105 LBS | HEART RATE: 97 BPM | SYSTOLIC BLOOD PRESSURE: 140 MMHG | DIASTOLIC BLOOD PRESSURE: 70 MMHG | TEMPERATURE: 98.2 F | BODY MASS INDEX: 18.61 KG/M2

## 2018-03-26 DIAGNOSIS — J06.9 ACUTE URI: Primary | ICD-10-CM

## 2018-03-26 PROBLEM — J42 CHRONIC BRONCHITIS (HCC): Status: ACTIVE | Noted: 2018-03-26

## 2018-03-26 PROCEDURE — 99213 OFFICE O/P EST LOW 20 MIN: CPT | Performed by: NURSE PRACTITIONER

## 2018-03-26 NOTE — PROGRESS NOTES
Subjective   Maite Pacheco is a 82 y.o. female.   Chief Complaint   Patient presents with   • Cough     x 2 days   • Chest Pain     from coughing     Vitals:    03/26/18 1259   BP: 140/70   Pulse: 97   Resp: 16   Temp: 98.2 °F (36.8 °C)   SpO2: 98%     No LMP recorded. Patient is postmenopausal.    History of Present Illness   Maite is a patient of Dr Berg who is here for an acute visit. She c/o sore throat, hoarseness, nasal drainage/congestion, and dry cough for 3 days. She denies fever, chills or body aches. She has a history of COPD which is well controlled on anoro. She denies wheezing or shortness of breath. She reports that her chest is sore from coughing. She has used throat lozenges but has not taking any medications otc     The following portions of the patient's history were reviewed and updated as appropriate: allergies, current medications, past family history, past medical history, past social history, past surgical history and problem list.    Review of Systems   Constitutional: Positive for chills. Negative for fever.   HENT: Positive for congestion, postnasal drip, rhinorrhea, sore throat and voice change. Negative for sinus pain and sinus pressure.    Respiratory: Positive for cough and chest tightness (chest soreness from coughing ). Negative for shortness of breath and wheezing.    Cardiovascular: Negative.        Objective   Physical Exam   Constitutional: Vital signs are normal. She appears well-developed and well-nourished. She does not appear ill. No distress.   HENT:   Nose: Mucosal edema and rhinorrhea present.   Mouth/Throat: Uvula is midline. Posterior oropharyngeal erythema (post nasal drainage noted in the posterior pharynx ) present.   Wears hearing aids bilaterally    Cardiovascular: Normal rate and regular rhythm.    Pulmonary/Chest: Effort normal and breath sounds normal. She has no wheezes. She has no rales.   Neurological: She is alert.       Assessment/Plan   Maite was seen today  for cough and chest pain.    Diagnoses and all orders for this visit:    Acute URI      Symptom treatment for now  Start claritin  Tylenol as needed  Robitussin as needed for cough/chest congestion  Salt water gargles  Throat lozenges  Rest and push fluids  Follow up /call in 5 days if your symptoms are persistent or sooner if your symptoms worsen or if new symptoms develop

## 2018-04-02 ENCOUNTER — TELEPHONE (OUTPATIENT)
Dept: FAMILY MEDICINE CLINIC | Facility: CLINIC | Age: 83
End: 2018-04-02

## 2018-04-02 NOTE — TELEPHONE ENCOUNTER
-----spoke to patient she is talking better but has difficulty with sleeping when laying down and now her mucous is yellow in color please review and advise .     Message from Katarzyna Ennis MA sent at 4/2/2018  8:29 AM EDT -----  Contact: PT  PT SAYS SHE IS NOT FEELING ANY BETTER AND [facility]AR[facility] TOLD HER TO CALL BACK IF SHE DID NOT GET ANY BETTER PT CAN BE REACHED [phone]

## 2018-04-04 ENCOUNTER — TELEPHONE (OUTPATIENT)
Dept: FAMILY MEDICINE CLINIC | Facility: CLINIC | Age: 83
End: 2018-04-04

## 2018-04-04 RX ORDER — AZITHROMYCIN 250 MG/1
TABLET, FILM COATED ORAL
Qty: 6 TABLET | Refills: 0 | Status: SHIPPED | OUTPATIENT
Start: 2018-04-04 | End: 2018-04-11

## 2018-04-04 RX ORDER — FLUCONAZOLE 150 MG/1
150 TABLET ORAL ONCE
Qty: 1 TABLET | Refills: 0 | Status: SHIPPED | OUTPATIENT
Start: 2018-04-04 | End: 2018-04-04

## 2018-04-04 NOTE — TELEPHONE ENCOUNTER
Pt called on 4/2/ but the message was never routed to me. She was upset and I apologized that I did not know that she had called. If I do not get the message then I simply do not know. It was sent to the MA and the MA returned her call but did not notify me  I called the patient back. She has a productive cough with yellow sputum now. She has tried otc treatment for a week. She denies fever, shortness of breath.   She is requesting an rx for diflucan to take in case she gets a yeast infection . She was advised only to take it only if symptoms of yeast  She should follow up if she is no better or any worse  She verbalized understanding   ----- Message from Tara Monet MA sent at 4/4/2018  9:02 AM EDT -----  Contact: PT   -----spoke to patient she is talking better but has difficulty with sleeping when laying down and now her mucous is yellow in color please review and advise .      Message from Katarzyna Ennis MA sent at 4/2/2018  8:29 AM EDT -----  Contact: PT  PT SAYS SHE IS NOT FEELING ANY BETTER AND GARTH TOLD HER TO CALL BACK IF SHE DID NOT GET ANY BETTER PT CAN BE REACHED [phone]

## 2018-04-10 ENCOUNTER — TELEPHONE (OUTPATIENT)
Dept: FAMILY MEDICINE CLINIC | Facility: CLINIC | Age: 83
End: 2018-04-10

## 2018-04-10 NOTE — TELEPHONE ENCOUNTER
I called the patient and left a VM to let her know to make an appt to see her PCP for follow up for cough since she finished antibiotics 2 days ago .       ----- Message from Gina Mitchell MA sent at 4/10/2018 10:45 AM EDT -----  Please review and advise  ----- Message -----  From: Jenny Agrawal  Sent: 4/10/2018  10:15 AM  To: Gina Mitchell MA    PATIENT WANTED TO LET GARTH KNOW SHE IS STILL COUGHING A SPITTING UP YELLOW FLEAM, WANTED TO SEE IF SOMETHING ELSE SHE COULD TRY TO MAKE IT GO  AWAY.    PT: [phone]

## 2018-04-11 ENCOUNTER — OFFICE VISIT (OUTPATIENT)
Dept: FAMILY MEDICINE CLINIC | Facility: CLINIC | Age: 83
End: 2018-04-11

## 2018-04-11 VITALS
HEIGHT: 63 IN | HEART RATE: 85 BPM | WEIGHT: 105.4 LBS | OXYGEN SATURATION: 95 % | TEMPERATURE: 97.6 F | SYSTOLIC BLOOD PRESSURE: 124 MMHG | BODY MASS INDEX: 18.68 KG/M2 | DIASTOLIC BLOOD PRESSURE: 74 MMHG

## 2018-04-11 DIAGNOSIS — J41.1 MUCOPURULENT CHRONIC BRONCHITIS (HCC): Primary | ICD-10-CM

## 2018-04-11 DIAGNOSIS — R07.89 CHEST WALL PAIN: ICD-10-CM

## 2018-04-11 PROCEDURE — 99213 OFFICE O/P EST LOW 20 MIN: CPT

## 2018-04-11 RX ORDER — CEFUROXIME AXETIL 250 MG/1
250 TABLET ORAL 2 TIMES DAILY
Qty: 14 TABLET | Refills: 0 | Status: SHIPPED | OUTPATIENT
Start: 2018-04-11 | End: 2018-04-26

## 2018-04-11 NOTE — PROGRESS NOTES
Subjective   Maite Pacheco is a 82 y.o. female. Patient is here today for   Chief Complaint   Patient presents with   • URI     PT SAW GARTH ON 03/26/2018 AND WAS DIAGNOSED WITH A URI, SHE TOOK A ZPAK AND IS STILL COUGHING UP YELLOW FLEM           Vitals:    04/11/18 1039   BP: 124/74   Pulse: 85   Temp: 97.6 °F (36.4 °C)   SpO2: 95%     The following portions of the patient's history were reviewed and updated as appropriate: allergies, current medications, past family history, past medical history, past social history, past surgical history and problem list.    Past Medical History:   Diagnosis Date   • Abnormal CT of the abdomen    • Allergic rhinitis    • Arthritis    • Chronic pancreatitis    • Diabetes mellitus     type 2   • Eustachian tube dysfunction    • History of pneumonia 12/2016   • Hyperglycemia    • Hyperlipidemia    • Inguinal hernia    • Neck pain    • Osteoporosis    • Pancreatic cyst    • Urinary frequency       Allergies   Allergen Reactions   • Sulfa Antibiotics Rash      Social History     Social History   • Marital status: Single     Spouse name: N/A   • Number of children: 0   • Years of education: COLLEGE GRADUATE     Occupational History   • RETIRED Retired     Social History Main Topics   • Smoking status: Former Smoker     Packs/day: 1.00     Years: 50.00     Types: Cigarettes     Quit date: 12/30/2016   • Smokeless tobacco: Never Used   • Alcohol use No   • Drug use: No   • Sexual activity: Defer     Other Topics Concern   • Not on file     Social History Narrative   • No narrative on file        Current Outpatient Prescriptions:   •  atorvastatin (LIPITOR) 10 MG tablet, Take 10 mg by mouth Daily., Disp: , Rfl:   •  calcium carbonate (OS-ROSAMARIA) 600 MG tablet, Take 600 mg by mouth Daily., Disp: , Rfl:   •  cholecalciferol (VITAMIN D3) 1000 UNITS tablet, Take 1,000 Units by mouth Daily. Takes takes from October to May, Disp: , Rfl:   •  metFORMIN ER (GLUCOPHAGE-XR) 500 MG 24 hr tablet, TAKE  1 TABLET BY MOUTH TWO  TIMES DAILY, Disp: 180 tablet, Rfl: 2  •  MULTIPLE VITAMINS PO, Take 1 tablet by mouth Daily., Disp: , Rfl:   •  Umeclidinium-Vilanterol (ANORO ELLIPTA) 62.5-25 MCG/INH aerosol powder , Inhale 1 puff Daily., Disp: , Rfl:   •  cefuroxime (CEFTIN) 250 MG tablet, Take 1 tablet by mouth 2 (Two) Times a Day., Disp: 14 tablet, Rfl: 0     Objective     History of Present Illness   The patient is here today for follow-up on persistent cough which is productive of some colored sputum for about 3 weeks.    Review of Systems   Constitutional: Negative for chills and fever.   HENT: Negative.    Respiratory: Positive for cough. Negative for shortness of breath and wheezing.    Cardiovascular:        The patient does have some right lower lateral rib cage discomfort when taking a deep breath, coughing, or changing positions   Musculoskeletal:        Right lower rib cage discomfort as described above   Neurological: Negative.    Psychiatric/Behavioral: Negative.        Physical Exam   Constitutional: She is oriented to person, place, and time. She appears well-developed and well-nourished.   Cardiovascular: Normal rate, regular rhythm and normal heart sounds.    Pulmonary/Chest: Effort normal and breath sounds normal. No respiratory distress. She has no wheezes. She has no rales.   Abdominal: Soft. Bowel sounds are normal.   Musculoskeletal:   The patient does have some tenderness in the right lower anterolateral rib cage area.  The breath sounds underneath this area seemed clear.   Neurological: She is alert and oriented to person, place, and time.   Skin: Skin is warm and dry.   Psychiatric: She has a normal mood and affect.   Nursing note and vitals reviewed.      ASSESSMENT  #1 bronchitis                    #2 chest wall pain    DISCUSSION/SUMMARY   The patient's vital signs are normal.  The patient started with her symptoms several weeks ago having a sore throat .some postnasal drainage and then cough.   The patient does not think she ever had a fever.  Her cough is not severe but is persistent and she has developed some discomfort in her right lower cage area which concerned her.  The chest exam did not suggest signs of pneumonia.  I believe she has some chest wall tenderness from coughing.  I am going to put her on another antibiotic, generic Ceftin 250 mg tablets 1 twice a day for 1 week.  If the patient develops any fever or respiratory distress she will call us.    PLAN   Patient to call if her symptoms don't resolve over the next week.  No Follow-up on file.

## 2018-04-26 ENCOUNTER — OFFICE VISIT (OUTPATIENT)
Dept: FAMILY MEDICINE CLINIC | Facility: CLINIC | Age: 83
End: 2018-04-26

## 2018-04-26 VITALS
RESPIRATION RATE: 16 BRPM | HEART RATE: 118 BPM | HEIGHT: 63 IN | DIASTOLIC BLOOD PRESSURE: 70 MMHG | WEIGHT: 104 LBS | SYSTOLIC BLOOD PRESSURE: 158 MMHG | TEMPERATURE: 97.8 F | BODY MASS INDEX: 18.43 KG/M2

## 2018-04-26 DIAGNOSIS — R05.9 COUGH: Primary | ICD-10-CM

## 2018-04-26 DIAGNOSIS — J41.1 MUCOPURULENT CHRONIC BRONCHITIS (HCC): ICD-10-CM

## 2018-04-26 PROCEDURE — 99213 OFFICE O/P EST LOW 20 MIN: CPT

## 2018-04-26 RX ORDER — DOXYCYCLINE HYCLATE 100 MG/1
CAPSULE ORAL
Qty: 11 CAPSULE | Refills: 0 | Status: SHIPPED | OUTPATIENT
Start: 2018-04-26 | End: 2018-12-26

## 2018-04-26 NOTE — PROGRESS NOTES
Subjective   Maite Pacheco is a 83 y.o. female. Patient is here today for   Chief Complaint   Patient presents with   • Follow-up     bronchitis; patient was herer on 4/11/18 but c/o pain in her left side and still has a cough          Vitals:    04/26/18 0937   BP: 158/70   Pulse: 118   Resp: 16   Temp: 97.8 °F (36.6 °C)     The following portions of the patient's history were reviewed and updated as appropriate: allergies, current medications, past family history, past medical history, past social history, past surgical history and problem list.    Past Medical History:   Diagnosis Date   • Abnormal CT of the abdomen    • Allergic rhinitis    • Arthritis    • Chronic pancreatitis    • Diabetes mellitus     type 2   • Eustachian tube dysfunction    • History of pneumonia 12/2016   • Hyperglycemia    • Hyperlipidemia    • Inguinal hernia    • Neck pain    • Osteoporosis    • Pancreatic cyst    • Urinary frequency       Allergies   Allergen Reactions   • Sulfa Antibiotics Rash      Social History     Social History   • Marital status: Single     Spouse name: N/A   • Number of children: 0   • Years of education: COLLEGE GRADUATE     Occupational History   • RETIRED Retired     Social History Main Topics   • Smoking status: Former Smoker     Packs/day: 1.00     Years: 50.00     Types: Cigarettes     Quit date: 12/30/2016   • Smokeless tobacco: Never Used   • Alcohol use No   • Drug use: No   • Sexual activity: Defer     Other Topics Concern   • Not on file     Social History Narrative   • No narrative on file        Current Outpatient Prescriptions:   •  atorvastatin (LIPITOR) 10 MG tablet, Take 10 mg by mouth Daily., Disp: , Rfl:   •  calcium carbonate (OS-ROSAMARIA) 600 MG tablet, Take 600 mg by mouth Daily., Disp: , Rfl:   •  cholecalciferol (VITAMIN D3) 1000 UNITS tablet, Take 1,000 Units by mouth Daily. Takes takes from October to May, Disp: , Rfl:   •  metFORMIN ER (GLUCOPHAGE-XR) 500 MG 24 hr tablet, TAKE 1 TABLET BY  MOUTH TWO  TIMES DAILY, Disp: 180 tablet, Rfl: 2  •  MULTIPLE VITAMINS PO, Take 1 tablet by mouth Daily., Disp: , Rfl:   •  Umeclidinium-Vilanterol (ANORO ELLIPTA) 62.5-25 MCG/INH aerosol powder , Inhale 1 puff Daily., Disp: , Rfl:   •  doxycycline (VIBRAMYCIN) 100 MG capsule, TAKE 2 CAPSULES THE FIRST DAY THEN TAKE 1 CAPSULE DAILY, Disp: 11 capsule, Rfl: 0     Objective     History of Present Illness   The patient is here today for reevaluation of a persistent cough, colored sputum and some left lower anterior chest discomfort    Review of Systems   Constitutional: Negative for chills and fever.   HENT: Negative.    Respiratory:        The patient states that she does not feel short of air.  She has a mild cough but it is still productive of yellow sputum.  The patient has not noted any wheezing.  The patient has noted some left lower anterior chest discomfort occasionally upon taking a deep breath.   Cardiovascular: Negative for chest pain.   Gastrointestinal: Negative.    Genitourinary: Negative.    Musculoskeletal: Negative.    Neurological: Negative.        Physical Exam   Constitutional: She is oriented to person, place, and time. She appears well-developed and well-nourished.   Cardiovascular: Normal rate and normal heart sounds.    Pulmonary/Chest: Effort normal. No respiratory distress. She has no rales.   The patient has a few very faint coarse breath sounds but no wheezes were heard.   Abdominal: Soft. Bowel sounds are normal.   Musculoskeletal:   No rib cage tenderness on palpation   Neurological: She is alert and oriented to person, place, and time.   Skin: Skin is warm and dry.   Psychiatric: She has a normal mood and affect.   Nursing note and vitals reviewed.      ASSESSMENT  #1 persistent bronchitis    DISCUSSION/SUMMARY   Upon recheck the patient's blood pressure was only 138/78.  She is afebrile.  She is in no acute distress and does not feel ill.  The patient states she has some mild persistent  cough which is still productive of yellowish sputum.  She was given Ceftin 250 mg twice a day for a week about 2 weeks ago here at this office.  She is on Anoro inhaler once daily long-term.  The patient was treated for a left pleural effusion, pneumonia and pulmonary nodules a little over a year ago.  The patient has an appointment to see her pulmonologist in about 6 weeks after getting a repeat CT scan.  A chest x-ray was done today which shows no active disease and no evidence of the pleural effusion and pneumonia that was on her chest x-ray done in January 2017.  I am going to have the patient use [facility] inhaler in place of her Anoro  Inhaler for the next few days.  She will then go back to the Anoro inhaler.  I'm also going to prescribe doxycycline capsules #11, one twice daily first day then one daily.    PLAN  The patient will let us know if she has any worsening of her condition over these next 2 weeks.    No Follow-up on file.

## 2018-05-23 ENCOUNTER — TELEPHONE (OUTPATIENT)
Dept: FAMILY MEDICINE CLINIC | Facility: CLINIC | Age: 83
End: 2018-05-23

## 2018-05-23 NOTE — TELEPHONE ENCOUNTER
Noted     ----- Message from Jenny Mitchell sent at 5/23/2018  9:29 AM EDT -----  LAST TIME SHE WAS HERE DR NATH WANTED TO KNOW HOW SHE WAS DOING WITH THE INHALER AND SHE SAID SHE FINISHED IT TODAY AND IS DOING GOOD.    PLEASE CALL PT BACK WITH ANY QUESTIONS [phone]

## 2018-06-04 ENCOUNTER — HOSPITAL ENCOUNTER (OUTPATIENT)
Dept: CT IMAGING | Facility: HOSPITAL | Age: 83
Discharge: HOME | End: 2018-06-04
Attending: INTERNAL MEDICINE | Admitting: INTERNAL MEDICINE

## 2018-06-04 DIAGNOSIS — R91.1 LUNG NODULE: ICD-10-CM

## 2018-06-04 PROCEDURE — 71250 CT THORAX DX C-: CPT

## 2018-06-13 ENCOUNTER — TRANSCRIBE ORDERS (OUTPATIENT)
Dept: ADMINISTRATIVE | Facility: HOSPITAL | Age: 83
End: 2018-06-13

## 2018-06-13 DIAGNOSIS — R91.1 LUNG NODULE: Primary | ICD-10-CM

## 2018-06-15 DIAGNOSIS — E78.5 HYPERLIPIDEMIA, UNSPECIFIED HYPERLIPIDEMIA TYPE: ICD-10-CM

## 2018-06-15 DIAGNOSIS — E11.9 TYPE 2 DIABETES MELLITUS WITHOUT COMPLICATION, UNSPECIFIED LONG TERM INSULIN USE STATUS: ICD-10-CM

## 2018-06-15 DIAGNOSIS — I10 ESSENTIAL HYPERTENSION: Primary | ICD-10-CM

## 2018-06-19 LAB
ALBUMIN SERPL-MCNC: 4.5 G/DL (ref 3.5–5.2)
ALBUMIN/CREAT UR: 15.2 MG/G CREAT (ref 0–30)
ALBUMIN/GLOB SERPL: 1.7 G/DL
ALP SERPL-CCNC: 75 U/L (ref 39–117)
ALT SERPL-CCNC: 25 U/L (ref 1–33)
AST SERPL-CCNC: 33 U/L (ref 1–32)
BILIRUB SERPL-MCNC: 0.5 MG/DL (ref 0.1–1.2)
BUN SERPL-MCNC: 15 MG/DL (ref 8–23)
BUN/CREAT SERPL: 22.1 (ref 7–25)
CALCIUM SERPL-MCNC: 9.4 MG/DL (ref 8.6–10.5)
CHLORIDE SERPL-SCNC: 101 MMOL/L (ref 98–107)
CHOLEST SERPL-MCNC: 138 MG/DL (ref 0–200)
CO2 SERPL-SCNC: 25.9 MMOL/L (ref 22–29)
CREAT SERPL-MCNC: 0.68 MG/DL (ref 0.57–1)
CREAT UR-MCNC: 100.8 MG/DL
GFR SERPLBLD CREATININE-BSD FMLA CKD-EPI: 101 ML/MIN/1.73
GFR SERPLBLD CREATININE-BSD FMLA CKD-EPI: 83 ML/MIN/1.73
GLOBULIN SER CALC-MCNC: 2.7 GM/DL
GLUCOSE SERPL-MCNC: 138 MG/DL (ref 65–99)
HBA1C MFR BLD: 6.3 % (ref 4.8–5.6)
HDLC SERPL-MCNC: 81 MG/DL (ref 40–60)
LDLC SERPL CALC-MCNC: 45 MG/DL (ref 0–100)
LDLC/HDLC SERPL: 0.56 {RATIO}
MICROALBUMIN UR-MCNC: 15.3 UG/ML
POTASSIUM SERPL-SCNC: 3.8 MMOL/L (ref 3.5–5.2)
PROT SERPL-MCNC: 7.2 G/DL (ref 6–8.5)
SODIUM SERPL-SCNC: 143 MMOL/L (ref 136–145)
TRIGL SERPL-MCNC: 59 MG/DL (ref 0–150)
VLDLC SERPL CALC-MCNC: 11.8 MG/DL (ref 5–40)

## 2018-06-25 ENCOUNTER — OFFICE VISIT (OUTPATIENT)
Dept: FAMILY MEDICINE CLINIC | Facility: CLINIC | Age: 83
End: 2018-06-25

## 2018-06-25 VITALS
HEIGHT: 63 IN | OXYGEN SATURATION: 95 % | BODY MASS INDEX: 18.43 KG/M2 | RESPIRATION RATE: 16 BRPM | WEIGHT: 104 LBS | SYSTOLIC BLOOD PRESSURE: 130 MMHG | DIASTOLIC BLOOD PRESSURE: 70 MMHG | HEART RATE: 95 BPM

## 2018-06-25 DIAGNOSIS — E11.59 TYPE 2 DIABETES MELLITUS WITH OTHER CIRCULATORY COMPLICATION, WITHOUT LONG-TERM CURRENT USE OF INSULIN (HCC): Primary | ICD-10-CM

## 2018-06-25 DIAGNOSIS — R79.89 ELEVATED LIVER FUNCTION TESTS: ICD-10-CM

## 2018-06-25 DIAGNOSIS — E78.00 PURE HYPERCHOLESTEROLEMIA: ICD-10-CM

## 2018-06-25 DIAGNOSIS — J42 CHRONIC BRONCHITIS, UNSPECIFIED CHRONIC BRONCHITIS TYPE (HCC): ICD-10-CM

## 2018-06-25 PROCEDURE — 99213 OFFICE O/P EST LOW 20 MIN: CPT

## 2018-06-25 RX ORDER — HEPATITIS A VACCINE 1440 [IU]/ML
INJECTION, SUSPENSION INTRAMUSCULAR
COMMUNITY
Start: 2018-05-01 | End: 2018-06-25

## 2018-06-25 NOTE — PROGRESS NOTES
Subjective   Maite Pacheco is a 83 y.o. female. Patient is here today for   Chief Complaint   Patient presents with   • Hypertension   • Diabetes          Vitals:    06/25/18 0944   BP: 130/70   Pulse: 95   Resp: 16   SpO2: 95%     The following portions of the patient's history were reviewed and updated as appropriate: allergies, current medications, past family history, past medical history, past social history, past surgical history and problem list.    Past Medical History:   Diagnosis Date   • Abnormal CT of the abdomen    • Allergic rhinitis    • Arthritis    • Chronic pancreatitis    • Diabetes mellitus     type 2   • Eustachian tube dysfunction    • History of pneumonia 12/2016   • Hyperglycemia    • Hyperlipidemia    • Inguinal hernia    • Neck pain    • Osteoporosis    • Pancreatic cyst    • Urinary frequency       Allergies   Allergen Reactions   • Sulfa Antibiotics Rash      Social History     Social History   • Marital status: Single     Spouse name: N/A   • Number of children: 0   • Years of education: COLLEGE GRADUATE     Occupational History   • RETIRED Retired     Social History Main Topics   • Smoking status: Former Smoker     Packs/day: 1.00     Years: 50.00     Types: Cigarettes     Quit date: 12/30/2016   • Smokeless tobacco: Never Used   • Alcohol use No   • Drug use: No   • Sexual activity: Defer     Other Topics Concern   • Not on file     Social History Narrative   • No narrative on file        Current Outpatient Prescriptions:   •  atorvastatin (LIPITOR) 10 MG tablet, Take 10 mg by mouth Daily., Disp: , Rfl:   •  calcium carbonate (OS-ROSAMARIA) 600 MG tablet, Take 600 mg by mouth Daily., Disp: , Rfl:   •  cholecalciferol (VITAMIN D3) 1000 UNITS tablet, Take 1,000 Units by mouth Daily. Takes takes from October to May, Disp: , Rfl:   •  doxycycline (VIBRAMYCIN) 100 MG capsule, TAKE 2 CAPSULES THE FIRST DAY THEN TAKE 1 CAPSULE DAILY, Disp: 11 capsule, Rfl: 0  •  metFORMIN ER (GLUCOPHAGE-XR) 500 MG  24 hr tablet, TAKE 1 TABLET BY MOUTH TWO  TIMES DAILY, Disp: 180 tablet, Rfl: 2  •  MULTIPLE VITAMINS PO, Take 1 tablet by mouth Daily., Disp: , Rfl:   •  Umeclidinium-Vilanterol (ANORO ELLIPTA) 62.5-25 MCG/INH aerosol powder , Inhale 1 puff Daily., Disp: , Rfl:      Objective     History of Present Illness   The patient is here today for follow-up on type 2 diabetes mellitus, essential hypertension, and hyperlipidemia.    Review of Systems   Constitutional: Negative.  Negative for chills and fever.   HENT:        The patient had a slight sore throat last week and some leftover hoarseness at this time but she is much improved.   Respiratory: Negative for shortness of breath and wheezing.         The patient has a slight cough and congestion.  She is on Anoro inhaler daily.  No significant wheezing problems.   Cardiovascular: Negative for chest pain, palpitations and leg swelling.   Gastrointestinal: Negative for blood in stool, constipation and diarrhea.   Genitourinary: Negative.    Musculoskeletal:        Minor arthritic pain   Neurological: Negative.         No numbness or tingling in the extremities or feet   Psychiatric/Behavioral: Negative.        Physical Exam   Constitutional: She is oriented to person, place, and time. She appears well-developed and well-nourished.   Neck:   Carotid pulses normal   Cardiovascular: Normal rate, regular rhythm and normal heart sounds.    Pulmonary/Chest: Effort normal.   A few fairly faint coarse breath sounds were heard.  No wheezes.  No rales.   Abdominal: Soft. Bowel sounds are normal.   Musculoskeletal: She exhibits no edema.   Mild to moderate osteoarthritic changes in multiple joints   Neurological: She is alert and oriented to person, place, and time.   Skin: Skin is warm and dry.   Psychiatric: She has a normal mood and affect.   Nursing note and vitals reviewed.      ASSESSMENT  #1 type 2 diabetes mellitus             #2  hyperlipidemia       #3 chronic  bronchitis    DISCUSSION/SUMMARY   The patient's vital signs are normal.  CMP showed an elevated fasting blood sugar of 138 but the patient's hemoglobin A1c is in the appropriate range at 6.30%.   AST was 1. elevated and her ALT was normal.  Urine for microalbumin was negative.  Total cholesterol is 138, triglycerides 59, HDL cholesterol 81 and LDL cholesterol is 45.  Overall the patient is doing well she continues to be followed by pulmonology for her history of chronic obstructive pulmonary disease and small lung nodules.  The patient has had both pneumonia vaccines.  She has had a tetanus booster last year.  She has had the new shingles vaccine.  She gets her mammograms and bone density test through her gynecologist.      PLAN  Recheck 6 months with fasting CMP, lipid panel and hemoglobin A1c   No Follow-up on file.

## 2018-10-08 RX ORDER — ATORVASTATIN CALCIUM 10 MG/1
TABLET, FILM COATED ORAL
Qty: 90 TABLET | Refills: 0 | Status: SHIPPED | OUTPATIENT
Start: 2018-10-08 | End: 2018-11-26 | Stop reason: SDUPTHER

## 2018-10-08 RX ORDER — METFORMIN HYDROCHLORIDE 500 MG/1
TABLET, EXTENDED RELEASE ORAL
Qty: 180 TABLET | Refills: 0 | Status: SHIPPED | OUTPATIENT
Start: 2018-10-08 | End: 2019-01-04 | Stop reason: SDUPTHER

## 2018-11-26 RX ORDER — ATORVASTATIN CALCIUM 10 MG/1
TABLET, FILM COATED ORAL
Qty: 90 TABLET | Refills: 3 | Status: SHIPPED | OUTPATIENT
Start: 2018-11-26 | End: 2019-09-27 | Stop reason: SDUPTHER

## 2018-12-05 ENCOUNTER — HOSPITAL ENCOUNTER (OUTPATIENT)
Dept: CT IMAGING | Facility: HOSPITAL | Age: 83
Discharge: HOME | End: 2018-12-05
Attending: INTERNAL MEDICINE | Admitting: INTERNAL MEDICINE

## 2018-12-05 DIAGNOSIS — R91.1 LUNG NODULE: ICD-10-CM

## 2018-12-05 PROCEDURE — 71250 CT THORAX DX C-: CPT

## 2018-12-14 DIAGNOSIS — E78.5 HYPERLIPIDEMIA, UNSPECIFIED HYPERLIPIDEMIA TYPE: ICD-10-CM

## 2018-12-14 DIAGNOSIS — E11.9 TYPE 2 DIABETES MELLITUS WITHOUT COMPLICATION, UNSPECIFIED WHETHER LONG TERM INSULIN USE (HCC): Primary | ICD-10-CM

## 2018-12-17 LAB
ALBUMIN SERPL-MCNC: 4.2 G/DL (ref 3.5–5.2)
ALBUMIN/GLOB SERPL: 1.6 G/DL
ALP SERPL-CCNC: 74 U/L (ref 39–117)
ALT SERPL-CCNC: 19 U/L (ref 1–33)
AST SERPL-CCNC: 26 U/L (ref 1–32)
BILIRUB SERPL-MCNC: 0.5 MG/DL (ref 0.1–1.2)
BUN SERPL-MCNC: 21 MG/DL (ref 8–23)
BUN/CREAT SERPL: 38.2 (ref 7–25)
CALCIUM SERPL-MCNC: 9.3 MG/DL (ref 8.6–10.5)
CHLORIDE SERPL-SCNC: 104 MMOL/L (ref 98–107)
CHOLEST SERPL-MCNC: 140 MG/DL (ref 0–200)
CO2 SERPL-SCNC: 26.2 MMOL/L (ref 22–29)
CREAT SERPL-MCNC: 0.55 MG/DL (ref 0.57–1)
GLOBULIN SER CALC-MCNC: 2.7 GM/DL
GLUCOSE SERPL-MCNC: 146 MG/DL (ref 65–99)
HBA1C MFR BLD: 6.91 % (ref 4.8–5.6)
HDLC SERPL-MCNC: 84 MG/DL (ref 40–60)
LDLC SERPL CALC-MCNC: 44 MG/DL (ref 0–100)
LDLC/HDLC SERPL: 0.52 {RATIO}
POTASSIUM SERPL-SCNC: 3.8 MMOL/L (ref 3.5–5.2)
PROT SERPL-MCNC: 6.9 G/DL (ref 6–8.5)
SODIUM SERPL-SCNC: 145 MMOL/L (ref 136–145)
TRIGL SERPL-MCNC: 62 MG/DL (ref 0–150)
VLDLC SERPL CALC-MCNC: 12.4 MG/DL (ref 5–40)

## 2018-12-26 ENCOUNTER — OFFICE VISIT (OUTPATIENT)
Dept: FAMILY MEDICINE CLINIC | Facility: CLINIC | Age: 83
End: 2018-12-26

## 2018-12-26 VITALS
DIASTOLIC BLOOD PRESSURE: 80 MMHG | HEART RATE: 100 BPM | HEIGHT: 63 IN | RESPIRATION RATE: 16 BRPM | OXYGEN SATURATION: 95 % | BODY MASS INDEX: 18.61 KG/M2 | SYSTOLIC BLOOD PRESSURE: 138 MMHG | TEMPERATURE: 98 F | WEIGHT: 105 LBS

## 2018-12-26 DIAGNOSIS — E11.59 TYPE 2 DIABETES MELLITUS WITH OTHER CIRCULATORY COMPLICATION, WITHOUT LONG-TERM CURRENT USE OF INSULIN (HCC): Primary | ICD-10-CM

## 2018-12-26 DIAGNOSIS — E78.00 PURE HYPERCHOLESTEROLEMIA: ICD-10-CM

## 2018-12-26 PROBLEM — R79.89 ELEVATED LIVER FUNCTION TESTS: Status: RESOLVED | Noted: 2017-12-21 | Resolved: 2018-12-26

## 2018-12-26 PROCEDURE — 99213 OFFICE O/P EST LOW 20 MIN: CPT

## 2018-12-26 NOTE — PROGRESS NOTES
Subjective   Maite Pacheco is a 83 y.o. female. Patient is here today for   Chief Complaint   Patient presents with   • Diabetes     lab f/u          Vitals:    18 0848   BP: 138/80   Pulse: 100   Resp: 16   Temp: 98 °F (36.7 °C)   SpO2: 95%     The following portions of the patient's history were reviewed and updated as appropriate: allergies, current medications, past family history, past medical history, past social history, past surgical history and problem list.    Past Medical History:   Diagnosis Date   • Abnormal CT of the abdomen    • Allergic rhinitis    • Arthritis    • Chronic pancreatitis (CMS/HCC)    • Diabetes mellitus (CMS/HCC)     type 2   • Eustachian tube dysfunction    • History of pneumonia 2016   • Hyperglycemia    • Hyperlipidemia    • Inguinal hernia    • Neck pain    • Osteoporosis    • Pancreatic cyst    • Urinary frequency       Allergies   Allergen Reactions   • Sulfa Antibiotics Rash      Social History     Socioeconomic History   • Marital status: Single     Spouse name: Not on file   • Number of children: 0   • Years of education: COLLEGE GRADUATE   • Highest education level: Not on file   Social Needs   • Financial resource strain: Not on file   • Food insecurity - worry: Not on file   • Food insecurity - inability: Not on file   • Transportation needs - medical: Not on file   • Transportation needs - non-medical: Not on file   Occupational History   • Occupation: RETIRED     Employer: RETIRED   Tobacco Use   • Smoking status: Former Smoker     Packs/day: 1.00     Years: 50.00     Pack years: 50.00     Types: Cigarettes     Last attempt to quit: 2016     Years since quittin.9   • Smokeless tobacco: Never Used   Substance and Sexual Activity   • Alcohol use: No   • Drug use: No   • Sexual activity: Defer   Other Topics Concern   • Not on file   Social History Narrative   • Not on file        Current Outpatient Medications:   •  atorvastatin (LIPITOR) 10 MG tablet,  TAKE 1 TABLET BY MOUTH  DAILY, Disp: 90 tablet, Rfl: 3  •  calcium carbonate (OS-ROSAMARIA) 600 MG tablet, Take 600 mg by mouth Daily., Disp: , Rfl:   •  cholecalciferol (VITAMIN D3) 1000 UNITS tablet, Take 1,000 Units by mouth Daily. Takes takes from October to May, Disp: , Rfl:   •  metFORMIN ER (GLUCOPHAGE-XR) 500 MG 24 hr tablet, TAKE 1 TABLET BY MOUTH TWO  TIMES DAILY, Disp: 180 tablet, Rfl: 0  •  MULTIPLE VITAMINS PO, Take 1 tablet by mouth Daily., Disp: , Rfl:   •  Umeclidinium-Vilanterol (ANORO ELLIPTA) 62.5-25 MCG/INH aerosol powder , Inhale 1 puff Daily., Disp: , Rfl:      Objective     History of Present Illness   The patient is here today for follow-up on type 2 diabetes mellitus and hyperlipidemia    Review of Systems   Constitutional: Negative.    HENT: Negative.    Respiratory: Positive for cough.         The patient states that she has had some problems with bronchitis for the last month.  She has seen her pulmonologist about this.  She is on an inhaler for it.  The patient denies any persistent wheezing and does not have significant shortness of breath.   Cardiovascular: Negative for chest pain, palpitations and leg swelling.   Gastrointestinal: Negative for abdominal pain, blood in stool, constipation and diarrhea.   Genitourinary: Negative.    Musculoskeletal:        Mild aches and pains only   Neurological: Negative.    Psychiatric/Behavioral: Negative.        Physical Exam   Constitutional: She is oriented to person, place, and time. She appears well-developed and well-nourished.   Eyes: Pupils are equal, round, and reactive to light.   Neck:   Carotid pulses normal   Cardiovascular: Normal rate, regular rhythm and normal heart sounds.   Pulmonary/Chest: Effort normal. She has no rales.   Slightly decreased breath sounds.  Occasional coarse breath sounds.  No wheezes.   Abdominal: Soft. Bowel sounds are normal.   Musculoskeletal: She exhibits no edema.   Mild to moderate osteoarthritic changes in  multiple joints   Neurological: She is alert and oriented to person, place, and time.   Skin: Skin is warm and dry.   Psychiatric: She has a normal mood and affect.   Nursing note and vitals reviewed.      ASSESSMENT  #1 type 2 diabetes mellitus                    #2 hyperlipidemia    DISCUSSION/SUMMARY   The patient's vital signs are normal.  CMP was normal except for elevated fasting blood sugar of 146 and her hemoglobin A1c is 6.91%, somewhat higher than on last visit 6 months ago.  The patient states that her diet has not been as good and she has not been walking as much recently.  She will remain on her present medications and just try to work on her diet and exercise.  Total cholesterol is 140, triglycerides 62, HDL cholesterol 84 and LDL cholesterol is 44.  The patient does have chronic lung disease and is currently having some bronchitis problems.  She has seen her pulmonologist and is on Anoro inhaler for this.  The patient also has pulmonary hypertension which is being followed by her  pulmonologist and is on no specific medication for this.    PLAN  Recheck in 6 months with fasting CMP, lipid panel, hemoglobin A1c, urine for microalbumin  No Follow-up on file.

## 2019-01-04 RX ORDER — METFORMIN HYDROCHLORIDE 500 MG/1
500 TABLET, EXTENDED RELEASE ORAL 2 TIMES DAILY
Qty: 180 TABLET | Refills: 3 | Status: SHIPPED | OUTPATIENT
Start: 2019-01-04 | End: 2019-04-11 | Stop reason: SDUPTHER

## 2019-01-07 ENCOUNTER — TRANSCRIBE ORDERS (OUTPATIENT)
Dept: ADMINISTRATIVE | Facility: HOSPITAL | Age: 84
End: 2019-01-07

## 2019-01-07 DIAGNOSIS — Z13.6 ENCOUNTER FOR SCREENING FOR VASCULAR DISEASE: Primary | ICD-10-CM

## 2019-01-09 ENCOUNTER — HOSPITAL ENCOUNTER (OUTPATIENT)
Dept: CARDIOLOGY | Facility: HOSPITAL | Age: 84
Discharge: HOME | End: 2019-01-09

## 2019-01-09 VITALS
DIASTOLIC BLOOD PRESSURE: 73 MMHG | WEIGHT: 105 LBS | BODY MASS INDEX: 18.61 KG/M2 | HEIGHT: 63 IN | SYSTOLIC BLOOD PRESSURE: 140 MMHG | HEART RATE: 78 BPM

## 2019-01-09 DIAGNOSIS — Z13.6 ENCOUNTER FOR SCREENING FOR VASCULAR DISEASE: ICD-10-CM

## 2019-01-09 LAB
BH CV ECHO MEAS - DIST AO DIAM: 1.38 CM
BH CV VAS BP LEFT ARM: NORMAL MMHG
BH CV VAS BP RIGHT ARM: NORMAL MMHG
BH CV XLRA MEAS - MID AO DIAM: 1.64 CM
BH CV XLRA MEAS - PROX AO DIAM: 2.17 CM

## 2019-01-09 PROCEDURE — 93799 UNLISTED CV SVC/PROCEDURE: CPT

## 2019-01-15 ENCOUNTER — TELEPHONE (OUTPATIENT)
Dept: FAMILY MEDICINE CLINIC | Facility: CLINIC | Age: 84
End: 2019-01-15

## 2019-01-15 NOTE — TELEPHONE ENCOUNTER
Patient notified.    ----- Message from Tong Berg MD sent at 1/14/2019 12:49 PM EST -----  You may tell the patient that she had no evidence of an  abdominal aortic aneurysm.  Her test was entirely normal.  She could wait 2 years to repeat this.

## 2019-04-05 ENCOUNTER — TRANSCRIBE ORDERS (OUTPATIENT)
Dept: ADMINISTRATIVE | Facility: HOSPITAL | Age: 84
End: 2019-04-05

## 2019-04-05 DIAGNOSIS — M81.0 OSTEOPOROSIS OF MULTIPLE SITES: Primary | ICD-10-CM

## 2019-04-11 RX ORDER — METFORMIN HYDROCHLORIDE 500 MG/1
500 TABLET, EXTENDED RELEASE ORAL 2 TIMES DAILY
Qty: 14 TABLET | Refills: 0 | Status: SHIPPED | OUTPATIENT
Start: 2019-04-11 | End: 2019-10-09 | Stop reason: SDUPTHER

## 2019-04-22 ENCOUNTER — HOSPITAL ENCOUNTER (OUTPATIENT)
Dept: BONE DENSITY | Facility: HOSPITAL | Age: 84
Discharge: HOME | End: 2019-04-22
Admitting: OBSTETRICS & GYNECOLOGY

## 2019-04-22 DIAGNOSIS — M81.0 OSTEOPOROSIS OF MULTIPLE SITES: ICD-10-CM

## 2019-04-22 PROCEDURE — 77080 DXA BONE DENSITY AXIAL: CPT

## 2019-05-30 DIAGNOSIS — E11.59 TYPE 2 DIABETES MELLITUS WITH OTHER CIRCULATORY COMPLICATION, WITHOUT LONG-TERM CURRENT USE OF INSULIN (HCC): ICD-10-CM

## 2019-05-30 DIAGNOSIS — E78.00 PURE HYPERCHOLESTEROLEMIA: ICD-10-CM

## 2019-06-05 LAB
ALBUMIN SERPL-MCNC: 4.5 G/DL (ref 3.5–5.2)
ALBUMIN/GLOB SERPL: 1.7 G/DL
ALP SERPL-CCNC: 75 U/L (ref 39–117)
ALT SERPL-CCNC: 23 U/L (ref 1–33)
AST SERPL-CCNC: 30 U/L (ref 1–32)
BILIRUB SERPL-MCNC: 0.6 MG/DL (ref 0.2–1.2)
BUN SERPL-MCNC: 19 MG/DL (ref 8–23)
BUN/CREAT SERPL: 28.4 (ref 7–25)
CALCIUM SERPL-MCNC: 9.8 MG/DL (ref 8.6–10.5)
CHLORIDE SERPL-SCNC: 104 MMOL/L (ref 98–107)
CHOLEST SERPL-MCNC: 143 MG/DL (ref 0–200)
CO2 SERPL-SCNC: 28.4 MMOL/L (ref 22–29)
CREAT SERPL-MCNC: 0.67 MG/DL (ref 0.57–1)
GLOBULIN SER CALC-MCNC: 2.6 GM/DL
GLUCOSE SERPL-MCNC: 142 MG/DL (ref 65–99)
HBA1C MFR BLD: 6.4 % (ref 4.8–5.6)
HDLC SERPL-MCNC: 89 MG/DL (ref 40–60)
LDLC SERPL CALC-MCNC: 43 MG/DL (ref 0–100)
LDLC/HDLC SERPL: 0.49 {RATIO}
MICROALBUMIN UR-MCNC: 23.7 UG/ML
POTASSIUM SERPL-SCNC: 4 MMOL/L (ref 3.5–5.2)
PROT SERPL-MCNC: 7.1 G/DL (ref 6–8.5)
SODIUM SERPL-SCNC: 145 MMOL/L (ref 136–145)
TRIGL SERPL-MCNC: 54 MG/DL (ref 0–150)
VLDLC SERPL CALC-MCNC: 10.8 MG/DL

## 2019-06-11 ENCOUNTER — OFFICE VISIT (OUTPATIENT)
Dept: FAMILY MEDICINE CLINIC | Facility: CLINIC | Age: 84
End: 2019-06-11

## 2019-06-11 VITALS
HEIGHT: 63 IN | WEIGHT: 106.2 LBS | DIASTOLIC BLOOD PRESSURE: 90 MMHG | HEART RATE: 101 BPM | SYSTOLIC BLOOD PRESSURE: 160 MMHG | TEMPERATURE: 98.1 F | RESPIRATION RATE: 16 BRPM | OXYGEN SATURATION: 91 % | BODY MASS INDEX: 18.82 KG/M2

## 2019-06-11 DIAGNOSIS — J42 CHRONIC BRONCHITIS, UNSPECIFIED CHRONIC BRONCHITIS TYPE (HCC): ICD-10-CM

## 2019-06-11 DIAGNOSIS — E11.59 TYPE 2 DIABETES MELLITUS WITH OTHER CIRCULATORY COMPLICATION, WITHOUT LONG-TERM CURRENT USE OF INSULIN (HCC): Primary | ICD-10-CM

## 2019-06-11 DIAGNOSIS — E78.00 PURE HYPERCHOLESTEROLEMIA: ICD-10-CM

## 2019-06-11 PROCEDURE — 99213 OFFICE O/P EST LOW 20 MIN: CPT

## 2019-06-11 NOTE — PROGRESS NOTES
Subjective   Maite Pacheco is a 84 y.o. female. Patient is here today for   Chief Complaint   Patient presents with   • Diabetes     FOLLOW UP LABS          Vitals:    19 0929   BP: 160/90   Pulse: 101   Resp: 16   Temp: 98.1 °F (36.7 °C)   SpO2: 91%     The following portions of the patient's history were reviewed and updated as appropriate: allergies, current medications, past family history, past medical history, past social history, past surgical history and problem list.    Past Medical History:   Diagnosis Date   • Abnormal CT of the abdomen    • Allergic rhinitis    • Arthritis    • Chronic pancreatitis (CMS/HCC)    • Diabetes mellitus (CMS/HCC)     type 2   • Eustachian tube dysfunction    • History of pneumonia 2016   • Hyperglycemia    • Hyperlipidemia    • Inguinal hernia    • Neck pain    • Osteoporosis    • Pancreatic cyst    • Urinary frequency       Allergies   Allergen Reactions   • Sulfa Antibiotics Rash      Social History     Socioeconomic History   • Marital status: Single     Spouse name: Not on file   • Number of children: 0   • Years of education: COLLEGE GRADUATE   • Highest education level: Not on file   Occupational History   • Occupation: RETIRED     Employer: RETIRED   Tobacco Use   • Smoking status: Former Smoker     Packs/day: 1.00     Years: 50.00     Pack years: 50.00     Types: Cigarettes     Last attempt to quit: 2016     Years since quittin.4   • Smokeless tobacco: Never Used   Substance and Sexual Activity   • Alcohol use: No   • Drug use: No   • Sexual activity: Defer        Current Outpatient Medications:   •  atorvastatin (LIPITOR) 10 MG tablet, TAKE 1 TABLET BY MOUTH  DAILY, Disp: 90 tablet, Rfl: 3  •  calcium carbonate (OS-ROSAMARIA) 600 MG tablet, Take 600 mg by mouth Daily., Disp: , Rfl:   •  cholecalciferol (VITAMIN D3) 1000 UNITS tablet, Take 1,000 Units by mouth Daily. Takes takes from October to May, Disp: , Rfl:   •  metFORMIN ER (GLUCOPHAGE-XR) 500 MG 24  hr tablet, Take 1 tablet by mouth 2 (Two) Times a Day., Disp: 14 tablet, Rfl: 0  •  MULTIPLE VITAMINS PO, Take 1 tablet by mouth Daily., Disp: , Rfl:   •  Umeclidinium-Vilanterol (ANORO ELLIPTA) 62.5-25 MCG/INH aerosol powder , Inhale 1 puff Daily., Disp: , Rfl:      Objective     History of Present Illness   The patient is here today for follow-up on type 2 diabetes mellitus, hyperlipidemia, and COPD    Review of Systems   Constitutional: Negative for activity change, appetite change, chills, fatigue and fever.   HENT: Negative.    Respiratory: Negative for cough and wheezing.         The patient does have a history of COPD and chronic bronchitis but she has not had to use her rescue inhaler at all.  She is on an Anoro  inhaler once daily.  She only has dyspnea with moderate exertion   Cardiovascular: Negative for chest pain, palpitations and leg swelling.   Gastrointestinal: Negative for abdominal pain, blood in stool, constipation, diarrhea and nausea.   Genitourinary: Negative.    Musculoskeletal:        Mild osteoarthritic aches and pains.   Neurological: Negative.    Hematological: Negative.    Psychiatric/Behavioral: Negative.        Physical Exam   Constitutional: She is oriented to person, place, and time. She appears well-developed and well-nourished.   Neck:   Carotid pulses noted   Cardiovascular: Normal rate, regular rhythm and normal heart sounds.   Pulmonary/Chest: Effort normal.   Somewhat decreased breath sounds.  A few very faint coarse breath sounds heard.   Abdominal: Soft. Bowel sounds are normal.   Musculoskeletal: She exhibits no edema.   Mild osteoarthritic changes in multiple joints   Neurological: She is alert and oriented to person, place, and time.   Skin: Skin is warm and dry.   Psychiatric: She has a normal mood and affect.       ASSESSMENT  #1 Type 2 diabetes mellitus                #2 hyperlipidemia                 #3 COPD    DISCUSSION/SUMMARY   Initially the patient's blood pressure  was elevated at 160/90.  Later in her visit I rechecked her blood pressure and it was down to 142/78.  I asked the patient to just check her blood pressures at home and if she is averaging less than 140/85 we will not need to add any blood pressure medicine.  Fasting CMP showed a blood sugar of 142 but was otherwise normal.  The patient's hemoglobin A1c is 6.40%, down from 6.91% on her last visit 6 months ago.  Urine for microalbumin was 23.7.    PLAN  Recheck in 6 months with fasting CMP, lipid panel and hemoglobin A1c  No Follow-up on file.

## 2019-06-25 ENCOUNTER — OFFICE VISIT (OUTPATIENT)
Dept: FAMILY MEDICINE CLINIC | Facility: CLINIC | Age: 84
End: 2019-06-25

## 2019-06-25 VITALS
RESPIRATION RATE: 16 BRPM | SYSTOLIC BLOOD PRESSURE: 110 MMHG | BODY MASS INDEX: 18.68 KG/M2 | TEMPERATURE: 97.9 F | WEIGHT: 105.4 LBS | DIASTOLIC BLOOD PRESSURE: 64 MMHG | OXYGEN SATURATION: 93 % | HEIGHT: 63 IN | HEART RATE: 94 BPM

## 2019-06-25 DIAGNOSIS — M54.2 NECK PAIN: Primary | ICD-10-CM

## 2019-06-25 PROBLEM — R07.89 CHEST WALL PAIN: Status: RESOLVED | Noted: 2018-04-11 | Resolved: 2019-06-25

## 2019-06-25 PROBLEM — J42 CHRONIC BRONCHITIS: Status: RESOLVED | Noted: 2018-03-26 | Resolved: 2019-06-25

## 2019-06-25 PROCEDURE — 99213 OFFICE O/P EST LOW 20 MIN: CPT | Performed by: NURSE PRACTITIONER

## 2019-06-25 RX ORDER — MELOXICAM 7.5 MG/1
7.5 TABLET ORAL DAILY
Qty: 30 TABLET | Refills: 3 | Status: SHIPPED | OUTPATIENT
Start: 2019-06-25 | End: 2019-07-12 | Stop reason: SDUPTHER

## 2019-06-25 NOTE — PROGRESS NOTES
Subjective   Maite Pacheco is a 84 y.o. female.   Chief Complaint   Patient presents with   • Neck Pain     has been going on for about 3 weeks pt states she just woke up and it was hurting     Vitals:    06/25/19 0910   BP: 110/64   Pulse: 94   Resp: 16   Temp: 97.9 °F (36.6 °C)   SpO2: 93%     No LMP recorded (lmp unknown). Patient is postmenopausal.    Maite is a patient of Dr Berg who is here for an acute visit       Neck Pain    This is a new problem. The current episode started 1 to 4 weeks ago. The problem occurs intermittently. The problem has been unchanged. The pain is associated with nothing. The quality of the pain is described as aching. The pain is mild. Nothing aggravates the symptoms. Pertinent negatives include no chest pain, fever, numbness, paresis, tingling, trouble swallowing or weakness. She has tried NSAIDs for the symptoms. The treatment provided mild relief.        The following portions of the patient's history were reviewed and updated as appropriate: allergies, current medications, past family history, past medical history, past social history, past surgical history and problem list.    Review of Systems   Constitutional: Negative for chills, fatigue and fever.   HENT: Negative for trouble swallowing.    Respiratory: Negative for chest tightness, shortness of breath and wheezing.    Cardiovascular: Negative for chest pain.   Musculoskeletal: Positive for neck pain.   Neurological: Negative for dizziness, tingling, weakness and numbness.       Objective   Physical Exam   Constitutional: Vital signs are normal. She appears well-developed and well-nourished. She does not appear ill. No distress.   Neck: No spinous process tenderness and no muscular tenderness present. Neck rigidity: with turning head side to side due to  pain  No edema, no erythema and normal range of motion (with turning head side to side due to  pain) present.   Cardiovascular: Normal rate.   Pulmonary/Chest: Effort  normal.   Neurological: She is alert.       Assessment/Plan   Maite was seen today for neck pain.    Diagnoses and all orders for this visit:    Neck pain    Other orders  -     meloxicam (MOBIC) 7.5 MG tablet; Take 1 tablet by mouth Daily. As needed for neck pain. Take with food      Discussed getting an xray but declined  Heating pad as needed  Neck exercises   Her last creatinine and gfr were normal, so will try meloxicam as needed, discussed potential GI side effects and recommend that she takes it with food  Follow up if symptoms persist, worsen or if new symptoms develop

## 2019-07-08 ENCOUNTER — TELEPHONE (OUTPATIENT)
Dept: FAMILY MEDICINE CLINIC | Facility: CLINIC | Age: 84
End: 2019-07-08

## 2019-07-08 DIAGNOSIS — M54.2 NECK PAIN: Primary | ICD-10-CM

## 2019-07-08 NOTE — TELEPHONE ENCOUNTER
I routed this to an MA at 745 this am and it was still open so I called her back. She stated no one had called her yet. It is ok for her her take 15mg of meloxicam. She is still having neck pain so will order an xray which she can walk into the diagnostic center and have done and will call her with the results     ______________________________________________  Please call her and let her know that   I gave her an rx for the lowest dose at 7.5mg. She can take two if needed. Please make sure she takes it with food. If she needs a new rx for the 15mg tab then I can send that in as well    ----- Message from Mary Kay Rich MA sent at 7/5/2019 12:05 PM EDT -----  Contact: PATIENT [phone]  Reva,     Patient said the meloxicam she is taking for her neck pain is not working very well and she is wondering if she can take 2 tablets? Does patient need appointment? Please advise. Thank you. (Patient is aware that you are not in the office today, Friday 07/05/2019, and said she was fine waiting until you came back in the office on Monday)

## 2019-07-10 ENCOUNTER — HOSPITAL ENCOUNTER (OUTPATIENT)
Dept: GENERAL RADIOLOGY | Facility: HOSPITAL | Age: 84
Discharge: HOME | End: 2019-07-10
Admitting: NURSE PRACTITIONER

## 2019-07-10 DIAGNOSIS — M54.2 NECK PAIN: ICD-10-CM

## 2019-07-10 PROCEDURE — 72050 X-RAY EXAM NECK SPINE 4/5VWS: CPT

## 2019-07-12 ENCOUNTER — TELEPHONE (OUTPATIENT)
Dept: FAMILY MEDICINE CLINIC | Facility: CLINIC | Age: 84
End: 2019-07-12

## 2019-07-12 DIAGNOSIS — M54.2 NECK PAIN: ICD-10-CM

## 2019-07-12 DIAGNOSIS — M50.30 DEGENERATIVE DISC DISEASE, CERVICAL: Primary | ICD-10-CM

## 2019-07-12 RX ORDER — MELOXICAM 15 MG/1
15 TABLET ORAL DAILY
Qty: 30 TABLET | Refills: 5 | Status: SHIPPED | OUTPATIENT
Start: 2019-07-12 | End: 2020-12-08

## 2019-07-12 NOTE — TELEPHONE ENCOUNTER
Discussed results with the patient   Her pain has improved but would like to continue to take meloxicam as needed  I sent an rx in and will refer her for PT  Will have her follow up if her symptoms persist or worsen, if she develops radicular symptoms will do an MRI   ----- Message from Mary Kay iRch MA sent at 7/12/2019 11:54 AM EDT -----  Reva,     This was the patient that you had called. Please see below. I am happy to call patient back if you would like. Thank you.      ----- Message -----  From: Yuko Cole RegSched Rep  Sent: 7/12/2019   9:11 AM  To: Mary Kay Rich MA    PT CALLED IN TO GET RESULTS OF XRAY THAT WAS DONE ON 07/08/19  PLEASE CONTACT PT WITH RESULTS [phone].      PT ALSO REQUESTING A NEW SCRIPT BE WRITTEN FOR meloxicam (MOBIC) 15 MG Take 1 tablet by mouth Daily. As needed for neck pain. Take with food #30 (PER TELEPHONE ENCOUNTER ON 7/8/19 JT OK FOR PT TO INCREASE FROM 7.5 MG TO 15 MG)  PLEASE CONTACT PT WHEN READY FOR  [phone]

## 2019-08-01 ENCOUNTER — TREATMENT (OUTPATIENT)
Dept: PHYSICAL THERAPY | Facility: CLINIC | Age: 84
End: 2019-08-01

## 2019-08-01 DIAGNOSIS — M54.2 PAIN, NECK: Primary | ICD-10-CM

## 2019-08-01 DIAGNOSIS — Z74.09 LIMITED MOBILITY: ICD-10-CM

## 2019-08-01 PROCEDURE — 97161 PT EVAL LOW COMPLEX 20 MIN: CPT | Performed by: PHYSICAL THERAPIST

## 2019-08-01 PROCEDURE — 97110 THERAPEUTIC EXERCISES: CPT | Performed by: PHYSICAL THERAPIST

## 2019-08-01 PROCEDURE — 97140 MANUAL THERAPY 1/> REGIONS: CPT | Performed by: PHYSICAL THERAPIST

## 2019-08-01 NOTE — PROGRESS NOTES
Physical Therapy Initial Evaluation and Plan of Care      Patient: Maite Pacheco   : 1935  Diagnosis/ICD-10 Code:  Pain, neck [M54.2]  Referring practitioner: SINA Thomas  Date of Initial Visit: 2019  Today's Date: 2019  Patient seen for 1 sessions           Subjective Questionnaire: NDI:18      Subjective Evaluation    History of Present Illness  Mechanism of injury: L sided neck pain began 19. Woke up stiff neck. No relief with meloxicam. Relief with regular strength aleve. Today is the best day I've had. Popping drives me crazy. No trouble sleeping.  X-ray 19: Degenerative disc and facet change in the cervical spine.  There appears to be ankylosis across the C3-4 level.      Patient Occupation: Retired  Pain  Current pain ratin  At worst pain ratin  Location: L sided neck pain  Quality: dull ache  Relieving factors: medications  Aggravating factors: movement  Progression: no change    Social Support  Lives in: one-story house  Lives with: alone    Hand dominance: right    Diagnostic Tests  X-ray: abnormal    Treatments  Previous treatment: medication  Current treatment: physical therapy  Patient Goals  Patient goals for therapy: decreased pain, increased motion, increased strength and independence with ADLs/IADLs             Objective       Postural Observations  Seated posture: fair    Additional Postural Observation Details  Fwd head    Palpation   Left   Tenderness of the scalenes and upper trapezius.     Tenderness   Cervical Spine   No tenderness in the spinous process.     Neurological Testing     Additional Neurological Details  Denies tingling, burning, numbness    Active Range of Motion   Cervical/Thoracic Spine   Cervical    Flexion: 38 degrees   Extension: 27 degrees   Left lateral flexion: 20 degrees   Right lateral flexion: 20 degrees   Left rotation: 29 degrees with pain  Right rotation: 31 degrees with pain    Additional Active Range of Motion  Details  P! L upper trapezius    Strength/Myotome Testing     Left Shoulder     Planes of Motion   Flexion: 5   Abduction: 5   External rotation at 0°: 5     Right Shoulder     Planes of Motion   Flexion: 5   Abduction: 5   External rotation at 0°: 5   Internal rotation at 0°: 4+     Left Elbow   Flexion: 5  Extension: 5    Right Elbow   Flexion: 5  Extension: 5    Tests     Additional Tests Details  Unable to accurately test Spurling's due to pain w/ SB alone         Assessment & Plan     Assessment  Impairments: abnormal muscle tone, abnormal or restricted ROM, lacks appropriate home exercise program and pain with function  Assessment details: Pt presents with limited neck mobility, forward head posture, and tenderness of left upper trapezius and scalenes. Will benefit from skilled PT services in order to address listed impairments and increase tolerance to normal daily activities including ADLs and recreational activities.    Prognosis: good  Functional Limitations: uncomfortable because of pain  Goals  Plan Goals: Short Term Goals: 2-4 weeks  Patient will:  1. Be independent with initial HEP  2. Be instructed in posture and body mechanics  3. Report pain </= 5/10 with all daily activities    Long Term Goals: 4-8 weeks  Patient will:  1. Report pain of </= 1/10 with all daily activities  2. Demonstrate no soft tissue restriction to allow for pain-free cervical AROM  3. Perceived disability </=10% as measured by Neck Disability Index  4. Be independent with long term HEP    Plan  Therapy options: will be seen for skilled physical therapy services  Planned modality interventions: cryotherapy, electrical stimulation/Russian stimulation, thermotherapy (hydrocollator packs), traction and ultrasound  Other planned modality interventions: Dry Needling  Planned therapy interventions: ADL retraining, body mechanics training, flexibility, functional ROM exercises, home exercise program, joint mobilization, manual therapy,  neuromuscular re-education, soft tissue mobilization, spinal/joint mobilization, strengthening, stretching and therapeutic activities  Frequency: 2x week  Duration in weeks: 12  Treatment plan discussed with: patient        Manual Therapy:    15     mins  12018;  Therapeutic Exercise:    10     mins  49967;     Neuromuscular Pallavi:    0    mins  74572;    Therapeutic Activity:     0     mins  35900;     Gait Trainin     mins  95708;     Ultrasound:     0     mins  11805;    Electrical Stimulation:    0     mins  73712 ( );  Dry Needling     0     mins self-pay    Timed Treatment:   25   mins   Total Treatment:     60   mins    PT SIGNATURE: Bridgette Delong, ISAÍAS   DATE TREATMENT INITIATED: 2019    Initial Certification  Certification Period: 10/30/2019  I certify that the therapy services are furnished while this patient is under my care.  The services outlined above are required by this patient, and will be reviewed every 90 days.     PHYSICIAN: Reva Salinas APRN      DATE:     Please sign and return via fax to [phone].. Thank you, [facility].

## 2019-08-05 ENCOUNTER — TREATMENT (OUTPATIENT)
Dept: PHYSICAL THERAPY | Facility: CLINIC | Age: 84
End: 2019-08-05

## 2019-08-05 DIAGNOSIS — Z74.09 LIMITED MOBILITY: ICD-10-CM

## 2019-08-05 DIAGNOSIS — M54.2 PAIN, NECK: Primary | ICD-10-CM

## 2019-08-05 PROCEDURE — 97140 MANUAL THERAPY 1/> REGIONS: CPT | Performed by: PHYSICAL THERAPIST

## 2019-08-05 PROCEDURE — 97110 THERAPEUTIC EXERCISES: CPT | Performed by: PHYSICAL THERAPIST

## 2019-08-05 PROCEDURE — G0283 ELEC STIM OTHER THAN WOUND: HCPCS | Performed by: PHYSICAL THERAPIST

## 2019-08-05 NOTE — PROGRESS NOTES
Physical Therapy Daily Progress Note      Visit # 2      Subjective   Pt reports she felt great after first visit but was sore the next morning. Felt better after exercises and aleve.    Objective   See Exercise, Manual, and Modality Logs for complete treatment.       Assessment/Plan  Continues to have L sided neck pain and tenderness of L cervical paraspinals and upper trapezius. Excellent tolerance to manual therapy w/ reports of improved mobility and decreased pain. Modified and updated HEP. Pt reported pain relief with initiation of estim and heat. Progress per POC.                 Manual Therapy:    20     mins  65325;  Therapeutic Exercise:    10     mins  38931;     Neuromuscular Pallavi:    0    mins  48847;    Therapeutic Activity:     0     mins  96559;     Gait Trainin     mins  67160;     Ultrasound:     0     mins  74673;    Work Hardening           0      mins 21135  Iontophoresis               0   mins 05336    Timed Treatment:   30   mins   Total Treatment:     45   mins    Bridgette Delong, PT  Physical Therapist

## 2019-08-12 ENCOUNTER — TREATMENT (OUTPATIENT)
Dept: PHYSICAL THERAPY | Facility: CLINIC | Age: 84
End: 2019-08-12

## 2019-08-12 DIAGNOSIS — M54.2 PAIN, NECK: Primary | ICD-10-CM

## 2019-08-12 DIAGNOSIS — Z74.09 LIMITED MOBILITY: ICD-10-CM

## 2019-08-12 PROCEDURE — 97110 THERAPEUTIC EXERCISES: CPT | Performed by: PHYSICAL THERAPIST

## 2019-08-12 PROCEDURE — G0283 ELEC STIM OTHER THAN WOUND: HCPCS | Performed by: PHYSICAL THERAPIST

## 2019-08-12 PROCEDURE — 97140 MANUAL THERAPY 1/> REGIONS: CPT | Performed by: PHYSICAL THERAPIST

## 2019-08-12 NOTE — PROGRESS NOTES
Physical Therapy Daily Progress Note    Visit # : 3  Maite Pacheco reports: I'm still noticing some cracks and pops in my neck.  The PT treatment felt good last visit.     Subjective     Objective   See Exercise, Manual, and Modality Logs for complete treatment.       Assessment/Plan  Pt tolerated manual interventions well without any notable muscle guarding.  Pt demonstrates good understanding of HEP progression. Pt was issued updated HEP printout to facilitate compliance and recall.  Progress per Plan of Care           Manual Therapy:    15     mins  25422;  Therapeutic Exercise:    8     mins  02942;     Neuromuscular Pallavi:    -    mins  00207;    Therapeutic Activity:     -     mins  79759;     Gait Training:      -     mins  67578;     Ultrasound:     -     mins  38644;    Electrical Stimulation:    15     mins  53322 ( );      Timed Treatment:   23   mins   Total Treatment:     40   mins      Elizabeth Ennis PT  Physical Therapist  [place] License # [id number]

## 2019-08-12 NOTE — PATIENT INSTRUCTIONS
Access Code: [email or web address]   URL: [email or web address]   Date: 08/12/2019   Prepared by: Michelle Ennis     Exercises   Supine Shoulder Flexion AAROM with Hands Clasped - 10 reps - 1 sets - 5 hold - 1x daily   Seated Shoulder Rolls - 15 reps - 1 sets - 1x daily

## 2019-08-19 ENCOUNTER — TREATMENT (OUTPATIENT)
Dept: PHYSICAL THERAPY | Facility: CLINIC | Age: 84
End: 2019-08-19

## 2019-08-19 DIAGNOSIS — M54.2 PAIN, NECK: Primary | ICD-10-CM

## 2019-08-19 DIAGNOSIS — Z74.09 LIMITED MOBILITY: ICD-10-CM

## 2019-08-19 PROCEDURE — 97140 MANUAL THERAPY 1/> REGIONS: CPT | Performed by: PHYSICAL THERAPIST

## 2019-08-19 PROCEDURE — 97530 THERAPEUTIC ACTIVITIES: CPT | Performed by: PHYSICAL THERAPIST

## 2019-08-19 PROCEDURE — 97110 THERAPEUTIC EXERCISES: CPT | Performed by: PHYSICAL THERAPIST

## 2019-08-19 PROCEDURE — G0283 ELEC STIM OTHER THAN WOUND: HCPCS | Performed by: PHYSICAL THERAPIST

## 2019-08-26 ENCOUNTER — TREATMENT (OUTPATIENT)
Dept: PHYSICAL THERAPY | Facility: CLINIC | Age: 84
End: 2019-08-26

## 2019-08-26 DIAGNOSIS — M54.2 PAIN, NECK: Primary | ICD-10-CM

## 2019-08-26 DIAGNOSIS — Z74.09 LIMITED MOBILITY: ICD-10-CM

## 2019-08-26 PROCEDURE — 97110 THERAPEUTIC EXERCISES: CPT | Performed by: PHYSICAL THERAPIST

## 2019-08-26 PROCEDURE — 97140 MANUAL THERAPY 1/> REGIONS: CPT | Performed by: PHYSICAL THERAPIST

## 2019-08-26 NOTE — PROGRESS NOTES
Physical Therapy Daily Progress Note      Visit # 5      Subjective   Pt reports she has good and bad days. Overall she feels better than when she started PT.    Objective   See Exercise, Manual, and Modality Logs for complete treatment.       Assessment/Plan  Subjective reports of pain improving. Continues to have limited L cervical rotation. Good tolerance to manual therapy. Reports decreased muscle tension post-electrical stimulation and heat.               Manual Therapy:    15     mins  95536;  Therapeutic Exercise:    15     mins  87843;     Neuromuscular Pallavi:    0    mins  58450;    Therapeutic Activity:     0     mins  81733;     Gait Trainin     mins  17141;     Ultrasound:     0     mins  86637;    Work Hardening           0      mins 72008  Iontophoresis               0   mins 15504    Timed Treatment:   30   mins   Total Treatment:     53   mins    Bridgette Delong, PT  Physical Therapist

## 2019-09-03 ENCOUNTER — TREATMENT (OUTPATIENT)
Dept: PHYSICAL THERAPY | Facility: CLINIC | Age: 84
End: 2019-09-03

## 2019-09-03 DIAGNOSIS — M54.2 PAIN, NECK: Primary | ICD-10-CM

## 2019-09-03 DIAGNOSIS — Z74.09 LIMITED MOBILITY: ICD-10-CM

## 2019-09-03 PROCEDURE — 97140 MANUAL THERAPY 1/> REGIONS: CPT | Performed by: PHYSICAL THERAPIST

## 2019-09-03 PROCEDURE — 97110 THERAPEUTIC EXERCISES: CPT | Performed by: PHYSICAL THERAPIST

## 2019-09-03 PROCEDURE — G0283 ELEC STIM OTHER THAN WOUND: HCPCS | Performed by: PHYSICAL THERAPIST

## 2019-09-09 ENCOUNTER — TREATMENT (OUTPATIENT)
Dept: PHYSICAL THERAPY | Facility: CLINIC | Age: 84
End: 2019-09-09

## 2019-09-09 DIAGNOSIS — M54.2 PAIN, NECK: Primary | ICD-10-CM

## 2019-09-09 DIAGNOSIS — Z74.09 LIMITED MOBILITY: ICD-10-CM

## 2019-09-09 PROCEDURE — G0283 ELEC STIM OTHER THAN WOUND: HCPCS | Performed by: PHYSICAL THERAPIST

## 2019-09-09 PROCEDURE — 97140 MANUAL THERAPY 1/> REGIONS: CPT | Performed by: PHYSICAL THERAPIST

## 2019-09-09 PROCEDURE — 97110 THERAPEUTIC EXERCISES: CPT | Performed by: PHYSICAL THERAPIST

## 2019-09-09 NOTE — PROGRESS NOTES
Physical Therapy Daily Progress Note      Visit # 7      Subjective   Pt reports more pain in neck yesterday morning after cleaning windows the day before. Felt better today. Has some pain on L side of neck with turning.    Objective   See Exercise, Manual, and Modality Logs for complete treatment.       Assessment/Plan  Increased pain with increased activity level over the weekend. Significantly improved cervical rotation bilaterally with MWM. Good tolerance to all exercise. Updated HEP. Plan to continue 1x/week for 4 weeks to further decrease pain intensity and improve functional ROM.               Manual Therapy:    15     mins  87634;  Therapeutic Exercise:    30     mins  93001;     Neuromuscular Pallavi:    0    mins  51977;    Therapeutic Activity:     0     mins  24967;     Gait Trainin     mins  43435;     Ultrasound:     0     mins  37300;    Work Hardening           0      mins 27549  Iontophoresis               0   mins 04078    Timed Treatment:   45   mins   Total Treatment:     60   mins    Bridgette Delong, PT  Physical Therapist

## 2019-09-17 ENCOUNTER — TREATMENT (OUTPATIENT)
Dept: PHYSICAL THERAPY | Facility: CLINIC | Age: 84
End: 2019-09-17

## 2019-09-17 DIAGNOSIS — Z74.09 LIMITED MOBILITY: ICD-10-CM

## 2019-09-17 DIAGNOSIS — M54.2 PAIN, NECK: Primary | ICD-10-CM

## 2019-09-17 PROCEDURE — 97110 THERAPEUTIC EXERCISES: CPT | Performed by: PHYSICAL THERAPIST

## 2019-09-17 PROCEDURE — 97012 MECHANICAL TRACTION THERAPY: CPT | Performed by: PHYSICAL THERAPIST

## 2019-09-17 PROCEDURE — 97140 MANUAL THERAPY 1/> REGIONS: CPT | Performed by: PHYSICAL THERAPIST

## 2019-09-17 NOTE — PROGRESS NOTES
Physical Therapy Daily Progress Note      Visit # 8      Subjective   Pt reports she had been taking 1 aleve daily. She did not take aleve on Friday and Saturday, and on , she had significant increase in L sided neck pain. Pain decreased after resuming aleve.    Objective   See Exercise, Manual, and Modality Logs for complete treatment.       Assessment/Plan  Continues to have significantly limited L cervical rotation, joint hypomobility, and tightness of L cervical PVM. Excellent tolerance to manual therapy, new exercise, and initiation of cervical traction w/ reports of decreased pain. Progress per POC.                 Manual Therapy:    15     mins  52003;  Therapeutic Exercise:    15     mins  47533;     Neuromuscular Pallavi:    0    mins  39766;    Therapeutic Activity:     0     mins  99316;     Gait Trainin     mins  90669;     Ultrasound:     0     mins  55194;    Work Hardening           0      mins 60697  Iontophoresis               0   mins 99250    Timed Treatment:   30   mins   Total Treatment:     45   mins    Bridgette Delong, PT  Physical Therapist

## 2019-09-23 ENCOUNTER — TREATMENT (OUTPATIENT)
Dept: PHYSICAL THERAPY | Facility: CLINIC | Age: 84
End: 2019-09-23

## 2019-09-23 DIAGNOSIS — Z74.09 LIMITED MOBILITY: ICD-10-CM

## 2019-09-23 DIAGNOSIS — M54.2 PAIN, NECK: Primary | ICD-10-CM

## 2019-09-23 PROCEDURE — 97140 MANUAL THERAPY 1/> REGIONS: CPT | Performed by: PHYSICAL THERAPIST

## 2019-09-23 PROCEDURE — 97110 THERAPEUTIC EXERCISES: CPT | Performed by: PHYSICAL THERAPIST

## 2019-09-23 PROCEDURE — 97012 MECHANICAL TRACTION THERAPY: CPT | Performed by: PHYSICAL THERAPIST

## 2019-09-23 NOTE — PROGRESS NOTES
Physical Therapy Daily Progress Note      Visit # 9      Subjective   Pt reports less neck pain.    Objective   See Exercise, Manual, and Modality Logs for complete treatment.       Assessment/Plan  Subjective reports of pain improving. Continues to have increased tenderness of scalenes, SCM, and upper trapezius bilaterally. Good tolerance to strengthening. Reports symptom relief with cervical traction. Progress per POC.                 Manual Therapy:    10     mins  90906;  Therapeutic Exercise:    25     mins  38063;     Neuromuscular Pallavi:    0    mins  12809;    Therapeutic Activity:     0     mins  26723;     Gait Trainin     mins  26494;     Ultrasound:     0     mins  00876;    Work Hardening           0      mins 48703  Iontophoresis               0   mins 27817    Timed Treatment:   35   mins   Total Treatment:     48   mins    Bridgette Delong, PT  Physical Therapist

## 2019-09-27 RX ORDER — ATORVASTATIN CALCIUM 10 MG/1
10 TABLET, FILM COATED ORAL DAILY
Qty: 90 TABLET | Refills: 3 | Status: SHIPPED | OUTPATIENT
Start: 2019-09-27 | End: 2020-09-29

## 2019-10-01 ENCOUNTER — TREATMENT (OUTPATIENT)
Dept: PHYSICAL THERAPY | Facility: CLINIC | Age: 84
End: 2019-10-01

## 2019-10-01 DIAGNOSIS — Z74.09 LIMITED MOBILITY: ICD-10-CM

## 2019-10-01 DIAGNOSIS — M54.2 PAIN, NECK: Primary | ICD-10-CM

## 2019-10-01 PROCEDURE — 97110 THERAPEUTIC EXERCISES: CPT | Performed by: PHYSICAL THERAPIST

## 2019-10-01 PROCEDURE — 97140 MANUAL THERAPY 1/> REGIONS: CPT | Performed by: PHYSICAL THERAPIST

## 2019-10-01 PROCEDURE — G0283 ELEC STIM OTHER THAN WOUND: HCPCS | Performed by: PHYSICAL THERAPIST

## 2019-10-01 NOTE — PROGRESS NOTES
Physical Therapy Daily Progress Note      Visit # 10      Subjective   Pt reports she is doing better. Mild pain on L side on neck. She does not want to do traction because the sides of her head were sore.     Objective   See Exercise, Manual, and Modality Logs for complete treatment.       Assessment/Plan  Subjective reports of pain improving. Pt continues to have limited L cervical rotation that improves mildly with joint mobilization and manual stretching. Excellent tolerance to progressions in posture activities. Progress per POC.                 Manual Therapy:    10     mins  68689;  Therapeutic Exercise:    20     mins  11833;     Neuromuscular Pallavi:    0    mins  83560;    Therapeutic Activity:     0     mins  45830;     Gait Trainin     mins  80250;     Ultrasound:     0     mins  68509;    Work Hardening           0      mins 79924  Iontophoresis               0   mins 50306    Timed Treatment:   30   mins   Total Treatment:     50   mins    Bridgette Delong, PT  Physical Therapist

## 2019-10-07 ENCOUNTER — TREATMENT (OUTPATIENT)
Dept: PHYSICAL THERAPY | Facility: CLINIC | Age: 84
End: 2019-10-07

## 2019-10-07 DIAGNOSIS — Z74.09 LIMITED MOBILITY: ICD-10-CM

## 2019-10-07 DIAGNOSIS — M54.2 PAIN, NECK: Primary | ICD-10-CM

## 2019-10-07 PROCEDURE — 97140 MANUAL THERAPY 1/> REGIONS: CPT | Performed by: PHYSICAL THERAPIST

## 2019-10-07 PROCEDURE — 97110 THERAPEUTIC EXERCISES: CPT | Performed by: PHYSICAL THERAPIST

## 2019-10-07 NOTE — PROGRESS NOTES
Physical Therapy Daily Progress Note      Visit # 11      Subjective   Pt reports neck feels pretty good, even with the rain. She feels ready for discharge.    Objective   See Exercise, Manual, and Modality Logs for complete treatment.       Assessment/Plan  Pt is able to demonstrate pain-free cervical rotation WFL for driving and ADLs. Demonstrates good understanding of posture and HEP. Will DC to HEP at this time.               Manual Therapy:    15     mins  71896;  Therapeutic Exercise:    20     mins  58356;     Neuromuscular Pallavi:    0    mins  80972;    Therapeutic Activity:     0     mins  34822;     Gait Trainin     mins  70473;     Ultrasound:     0     mins  93675;    Work Hardening           0      mins 26978  Iontophoresis               0   mins 33485    Timed Treatment:   35   mins   Total Treatment:     55   mins    Bridgette Delong, PT  Physical Therapist

## 2019-10-07 NOTE — PROGRESS NOTES
Discharge Summary  Discharge Summary from Physical Therapy Report    Patient Information  Maite Pacheco  1935    Dates  PT visit: 8/1/19-10/7/19  Number of Visits: 11     Discharge Status of Patient: See Note dated 10/7/19    Goals: All Met    Visit Diagnoses:    ICD-10-CM ICD-9-CM   1. Pain, neck M54.2 723.1   2. Limited mobility Z74.09 V49.89       Discharge Plan: Continue with current home exercise program as instructed    Comments     Date of Discharge 10/7/19        Bridgette Delong, PT  Physical Therapist

## 2019-10-09 RX ORDER — METFORMIN HYDROCHLORIDE 500 MG/1
500 TABLET, EXTENDED RELEASE ORAL 2 TIMES DAILY
Qty: 180 TABLET | Refills: 1 | Status: SHIPPED | OUTPATIENT
Start: 2019-10-09 | End: 2020-03-10

## 2019-11-05 ENCOUNTER — TRANSCRIBE ORDERS (OUTPATIENT)
Dept: ADMINISTRATIVE | Facility: HOSPITAL | Age: 84
End: 2019-11-05

## 2019-11-05 DIAGNOSIS — Z13.6 ENCOUNTER FOR SCREENING FOR VASCULAR DISEASE: Primary | ICD-10-CM

## 2019-11-15 ENCOUNTER — HOSPITAL ENCOUNTER (OUTPATIENT)
Dept: CARDIOLOGY | Facility: HOSPITAL | Age: 84
Discharge: HOME | End: 2019-11-15
Admitting: SURGERY

## 2019-11-15 VITALS
BODY MASS INDEX: 19.14 KG/M2 | DIASTOLIC BLOOD PRESSURE: 70 MMHG | HEIGHT: 62 IN | SYSTOLIC BLOOD PRESSURE: 136 MMHG | WEIGHT: 104 LBS | HEART RATE: 80 BPM

## 2019-11-15 DIAGNOSIS — Z13.6 ENCOUNTER FOR SCREENING FOR VASCULAR DISEASE: ICD-10-CM

## 2019-11-15 LAB
BH CV ECHO MEAS - DIST AO DIAM: 1.39 CM
BH CV VAS BP LEFT ARM: NORMAL MMHG
BH CV VAS BP RIGHT ARM: NORMAL MMHG
BH CV XLRA MEAS - MID AO DIAM: 1.63 CM
BH CV XLRA MEAS - PROX AO DIAM: 2.2 CM
BH CV XLRA MEAS LEFT ICA/CCA RATIO: 1.08
BH CV XLRA MEAS LEFT MID CCA PSV: NORMAL CM/SEC
BH CV XLRA MEAS LEFT MID ICA PSV: NORMAL CM/SEC
BH CV XLRA MEAS LEFT PROX ECA PSV: NORMAL CM/SEC
BH CV XLRA MEAS RIGHT ICA/CCA RATIO: 1.09
BH CV XLRA MEAS RIGHT MID CCA PSV: NORMAL CM/SEC
BH CV XLRA MEAS RIGHT MID ICA PSV: NORMAL CM/SEC
BH CV XLRA MEAS RIGHT PROX ECA PSV: NORMAL CM/SEC

## 2019-11-15 PROCEDURE — 93799 UNLISTED CV SVC/PROCEDURE: CPT

## 2019-11-27 DIAGNOSIS — E11.59 TYPE 2 DIABETES MELLITUS WITH OTHER CIRCULATORY COMPLICATION, WITHOUT LONG-TERM CURRENT USE OF INSULIN (HCC): Primary | ICD-10-CM

## 2019-11-27 DIAGNOSIS — E78.00 PURE HYPERCHOLESTEROLEMIA: ICD-10-CM

## 2019-12-03 LAB
ALBUMIN SERPL-MCNC: 4.6 G/DL (ref 3.5–5.2)
ALBUMIN/GLOB SERPL: 1.8 G/DL
ALP SERPL-CCNC: 74 U/L (ref 39–117)
ALT SERPL-CCNC: 17 U/L (ref 1–33)
AST SERPL-CCNC: 25 U/L (ref 1–32)
BILIRUB SERPL-MCNC: 0.7 MG/DL (ref 0.2–1.2)
BUN SERPL-MCNC: 18 MG/DL (ref 8–23)
BUN/CREAT SERPL: 24.3 (ref 7–25)
CALCIUM SERPL-MCNC: 9.3 MG/DL (ref 8.6–10.5)
CHLORIDE SERPL-SCNC: 103 MMOL/L (ref 98–107)
CHOLEST SERPL-MCNC: 144 MG/DL (ref 0–200)
CO2 SERPL-SCNC: 28.4 MMOL/L (ref 22–29)
CREAT SERPL-MCNC: 0.74 MG/DL (ref 0.57–1)
GLOBULIN SER CALC-MCNC: 2.5 GM/DL
GLUCOSE SERPL-MCNC: 140 MG/DL (ref 65–99)
HBA1C MFR BLD: 6.9 % (ref 4.8–5.6)
HDLC SERPL-MCNC: 90 MG/DL (ref 40–60)
LDLC SERPL CALC-MCNC: 42 MG/DL (ref 0–100)
LDLC/HDLC SERPL: 0.47 {RATIO}
POTASSIUM SERPL-SCNC: 3.7 MMOL/L (ref 3.5–5.2)
PROT SERPL-MCNC: 7.1 G/DL (ref 6–8.5)
SODIUM SERPL-SCNC: 144 MMOL/L (ref 136–145)
TRIGL SERPL-MCNC: 59 MG/DL (ref 0–150)
VLDLC SERPL CALC-MCNC: 11.8 MG/DL

## 2019-12-20 ENCOUNTER — OFFICE VISIT (OUTPATIENT)
Dept: FAMILY MEDICINE CLINIC | Facility: CLINIC | Age: 84
End: 2019-12-20

## 2019-12-20 VITALS
OXYGEN SATURATION: 98 % | DIASTOLIC BLOOD PRESSURE: 72 MMHG | SYSTOLIC BLOOD PRESSURE: 160 MMHG | BODY MASS INDEX: 20.2 KG/M2 | HEIGHT: 61 IN | RESPIRATION RATE: 18 BRPM | HEART RATE: 106 BPM | WEIGHT: 107 LBS | TEMPERATURE: 97.9 F

## 2019-12-20 DIAGNOSIS — E78.00 PURE HYPERCHOLESTEROLEMIA: Primary | ICD-10-CM

## 2019-12-20 DIAGNOSIS — E11.59 TYPE 2 DIABETES MELLITUS WITH OTHER CIRCULATORY COMPLICATION, WITHOUT LONG-TERM CURRENT USE OF INSULIN (HCC): ICD-10-CM

## 2019-12-20 PROCEDURE — 99214 OFFICE O/P EST MOD 30 MIN: CPT | Performed by: INTERNAL MEDICINE

## 2019-12-24 NOTE — PROGRESS NOTES
Subjective   Maite Pacheco is a 84 y.o. female. Patient is here today for   Chief Complaint   Patient presents with   • Diabetes          Vitals:    19 1506   BP: 160/72   Pulse: 106   Resp: 18   Temp: 97.9 °F (36.6 °C)   SpO2: 98%     Body mass index is 19.89 kg/m².      Past Medical History:   Diagnosis Date   • Abnormal CT of the abdomen    • Allergic rhinitis    • Arthritis    • Chronic pancreatitis (CMS/HCC)    • Diabetes mellitus (CMS/HCC)     type 2   • Eustachian tube dysfunction    • History of pneumonia 2016   • Hyperglycemia    • Hyperlipidemia    • Inguinal hernia    • Neck pain    • Osteoporosis    • Pancreatic cyst    • Urinary frequency       Allergies   Allergen Reactions   • Sulfa Antibiotics Rash      Social History     Socioeconomic History   • Marital status: Single     Spouse name: Not on file   • Number of children: 0   • Years of education: COLLEGE GRADUATE   • Highest education level: Not on file   Occupational History   • Occupation: RETIRED     Employer: RETIRED   Tobacco Use   • Smoking status: Former Smoker     Packs/day: 1.00     Years: 50.00     Pack years: 50.00     Types: Cigarettes     Last attempt to quit: 2016     Years since quittin.9   • Smokeless tobacco: Never Used   Substance and Sexual Activity   • Alcohol use: No   • Drug use: No   • Sexual activity: Defer        Current Outpatient Medications:   •  atorvastatin (LIPITOR) 10 MG tablet, Take 1 tablet by mouth Daily., Disp: 90 tablet, Rfl: 3  •  calcium carbonate (OS-ROSAMARIA) 600 MG tablet, Take 600 mg by mouth Daily., Disp: , Rfl:   •  cholecalciferol (VITAMIN D3) 1000 UNITS tablet, Take 1,000 Units by mouth Daily. Takes takes from October to May, Disp: , Rfl:   •  meloxicam (MOBIC) 15 MG tablet, Take 1 tablet by mouth Daily. As needed for neck pain. Take with food, Disp: 30 tablet, Rfl: 5  •  metFORMIN ER (GLUCOPHAGE-XR) 500 MG 24 hr tablet, Take 1 tablet by mouth 2 (Two) Times a Day., Disp: 180 tablet, Rfl:  1  •  MULTIPLE VITAMINS PO, Take 1 tablet by mouth Daily., Disp: , Rfl:   •  Umeclidinium-Vilanterol (ANORO ELLIPTA) 62.5-25 MCG/INH aerosol powder , Inhale 1 puff Daily., Disp: , Rfl:      Objective     She is here to follow-up on labs done last week.    She has no complaints.    She used to be followed by Dr. Berg.  Today is the first day that I have met her.       Review of Systems   Constitutional: Negative.    HENT: Negative.    Respiratory: Negative.    Cardiovascular: Negative.    Musculoskeletal: Negative.    Psychiatric/Behavioral: Negative.        Physical Exam   Constitutional: She is oriented to person, place, and time. She appears well-developed and well-nourished.   HENT:   Head: Normocephalic.   Cardiovascular: Normal rate, regular rhythm and normal heart sounds.   Pulmonary/Chest: Effort normal and breath sounds normal.   Neurological: She is alert and oriented to person, place, and time.   Psychiatric: She has a normal mood and affect. Her behavior is normal. Thought content normal.   Nursing note and vitals reviewed.        Problem List Items Addressed This Visit        Cardiovascular and Mediastinum    Hyperlipidemia - Primary       Endocrine    Type 2 diabetes mellitus (CMS/[facility])            PLAN  Her hyperlipidemia is well controlled.    She has good control of her type 2 diabetes with a hemoglobin A1c of less than 7%.    Her blood pressure is a bit elevated.  Monitor for now.    I asked her to follow-up in about 6 months.  Labs prior to that visit should include: Hemoglobin A1c, comprehensive metabolic panel, lipid profile, urine microalbumin.  No follow-ups on file.

## 2020-03-10 RX ORDER — METFORMIN HYDROCHLORIDE 500 MG/1
TABLET, EXTENDED RELEASE ORAL
Qty: 180 TABLET | Refills: 1 | Status: SHIPPED | OUTPATIENT
Start: 2020-03-10 | End: 2020-08-19

## 2020-06-04 DIAGNOSIS — E78.00 PURE HYPERCHOLESTEROLEMIA: ICD-10-CM

## 2020-06-04 DIAGNOSIS — E11.59 TYPE 2 DIABETES MELLITUS WITH OTHER CIRCULATORY COMPLICATION, WITHOUT LONG-TERM CURRENT USE OF INSULIN (HCC): Primary | ICD-10-CM

## 2020-06-09 ENCOUNTER — RESULTS ENCOUNTER (OUTPATIENT)
Dept: FAMILY MEDICINE CLINIC | Facility: CLINIC | Age: 85
End: 2020-06-09

## 2020-06-09 DIAGNOSIS — E11.59 TYPE 2 DIABETES MELLITUS WITH OTHER CIRCULATORY COMPLICATION, WITHOUT LONG-TERM CURRENT USE OF INSULIN (HCC): ICD-10-CM

## 2020-06-09 DIAGNOSIS — E78.00 PURE HYPERCHOLESTEROLEMIA: ICD-10-CM

## 2020-06-10 LAB
ALBUMIN SERPL-MCNC: 4.5 G/DL (ref 3.5–5.2)
ALBUMIN/CREAT UR: 11 MG/G CREAT (ref 0–29)
ALBUMIN/GLOB SERPL: 1.6 G/DL
ALP SERPL-CCNC: 71 U/L (ref 39–117)
ALT SERPL-CCNC: 13 U/L (ref 1–33)
AST SERPL-CCNC: 18 U/L (ref 1–32)
BILIRUB SERPL-MCNC: 0.7 MG/DL (ref 0.2–1.2)
BUN SERPL-MCNC: 21 MG/DL (ref 8–23)
BUN/CREAT SERPL: 29.6 (ref 7–25)
CALCIUM SERPL-MCNC: 10.1 MG/DL (ref 8.6–10.5)
CHLORIDE SERPL-SCNC: 103 MMOL/L (ref 98–107)
CHOLEST SERPL-MCNC: 130 MG/DL (ref 0–200)
CO2 SERPL-SCNC: 28.2 MMOL/L (ref 22–29)
CREAT SERPL-MCNC: 0.71 MG/DL (ref 0.57–1)
CREAT UR-MCNC: 134.4 MG/DL
GLOBULIN SER CALC-MCNC: 2.8 GM/DL
GLUCOSE SERPL-MCNC: 158 MG/DL (ref 65–99)
HBA1C MFR BLD: 6.9 % (ref 4.8–5.6)
HDLC SERPL-MCNC: 74 MG/DL (ref 40–60)
LDLC SERPL CALC-MCNC: 41 MG/DL (ref 0–100)
LDLC/HDLC SERPL: 0.56 {RATIO}
MICROALBUMIN UR-MCNC: 14.3 UG/ML
POTASSIUM SERPL-SCNC: 4.5 MMOL/L (ref 3.5–5.2)
PROT SERPL-MCNC: 7.3 G/DL (ref 6–8.5)
SODIUM SERPL-SCNC: 140 MMOL/L (ref 136–145)
TRIGL SERPL-MCNC: 74 MG/DL (ref 0–150)
VLDLC SERPL CALC-MCNC: 14.8 MG/DL (ref 5–40)

## 2020-06-11 ENCOUNTER — OFFICE VISIT (OUTPATIENT)
Dept: FAMILY MEDICINE CLINIC | Facility: CLINIC | Age: 85
End: 2020-06-11

## 2020-06-11 VITALS
RESPIRATION RATE: 18 BRPM | BODY MASS INDEX: 19.98 KG/M2 | SYSTOLIC BLOOD PRESSURE: 160 MMHG | HEIGHT: 61 IN | TEMPERATURE: 97.3 F | WEIGHT: 105.8 LBS | OXYGEN SATURATION: 93 % | HEART RATE: 82 BPM | DIASTOLIC BLOOD PRESSURE: 82 MMHG

## 2020-06-11 DIAGNOSIS — E11.59 TYPE 2 DIABETES MELLITUS WITH OTHER CIRCULATORY COMPLICATION, WITHOUT LONG-TERM CURRENT USE OF INSULIN (HCC): ICD-10-CM

## 2020-06-11 DIAGNOSIS — E78.00 PURE HYPERCHOLESTEROLEMIA: Primary | ICD-10-CM

## 2020-06-11 PROCEDURE — 99213 OFFICE O/P EST LOW 20 MIN: CPT | Performed by: INTERNAL MEDICINE

## 2020-06-16 NOTE — PROGRESS NOTES
Subjective   Maite Pacheco is a 85 y.o. female. Patient is here today for   Chief Complaint   Patient presents with   • Follow-up     labs- hyperlipidemia and diabetes.           Vitals:    06/11/20 1027   BP: 160/82   Pulse: 82   Resp: 18   Temp: 97.3 °F (36.3 °C)   SpO2: 93%     Body mass index is 19.67 kg/m².      Past Medical History:   Diagnosis Date   • Abnormal CT of the abdomen    • Allergic rhinitis    • Arthritis    • Chronic pancreatitis (CMS/HCC)    • Diabetes mellitus (CMS/HCC)     type 2   • Eustachian tube dysfunction    • History of pneumonia 12/2016   • Hyperglycemia    • Hyperlipidemia    • Inguinal hernia    • Neck pain    • Osteoporosis    • Pancreatic cyst    • Urinary frequency       Allergies   Allergen Reactions   • Sulfa Antibiotics Rash      Social History     Socioeconomic History   • Marital status: Single     Spouse name: Not on file   • Number of children: 0   • Years of education: COLLEGE GRADUATE   • Highest education level: Not on file   Occupational History   • Occupation: RETIRED     Employer: RETIRED   Tobacco Use   • Smoking status: Former Smoker     Packs/day: 1.00     Years: 50.00     Pack years: 50.00     Types: Cigarettes     Last attempt to quit: 12/30/2016     Years since quitting: 3.4   • Smokeless tobacco: Never Used   Substance and Sexual Activity   • Alcohol use: No   • Drug use: No   • Sexual activity: Defer        Current Outpatient Medications:   •  atorvastatin (LIPITOR) 10 MG tablet, Take 1 tablet by mouth Daily., Disp: 90 tablet, Rfl: 3  •  calcium carbonate (OS-ROSAMARIA) 600 MG tablet, Take 600 mg by mouth Daily., Disp: , Rfl:   •  cholecalciferol (VITAMIN D3) 1000 UNITS tablet, Take 1,000 Units by mouth Daily. Takes takes from October to May, Disp: , Rfl:   •  metFORMIN ER (GLUCOPHAGE-XR) 500 MG 24 hr tablet, TAKE 1 TABLET BY MOUTH TWO  TIMES DAILY, Disp: 180 tablet, Rfl: 1  •  MULTIPLE VITAMINS PO, Take 1 tablet by mouth Daily., Disp: , Rfl:   •   Umeclidinium-Vilanterol (ANORO ELLIPTA) 62.5-25 MCG/INH aerosol powder , Inhale 1 puff Daily., Disp: , Rfl:   •  meloxicam (MOBIC) 15 MG tablet, Take 1 tablet by mouth Daily. As needed for neck pain. Take with food, Disp: 30 tablet, Rfl: 5     Objective     She is here today to follow-up on labs done last week.    She feels well.       Review of Systems   Constitutional: Negative.    HENT: Negative.    Respiratory: Negative.    Cardiovascular: Negative.    Musculoskeletal: Negative.    Psychiatric/Behavioral: Negative.        Physical Exam   Constitutional: She is oriented to person, place, and time. She appears well-developed and well-nourished.   HENT:   Head: Normocephalic and atraumatic.   Neck:   No carotid bruits   Cardiovascular: Normal rate, regular rhythm and normal heart sounds.   Pulmonary/Chest: Effort normal and breath sounds normal.   Neurological: She is alert and oriented to person, place, and time.   Psychiatric: She has a normal mood and affect. Her behavior is normal.   Nursing note and vitals reviewed.        Problem List Items Addressed This Visit        Cardiovascular and Mediastinum    Hyperlipidemia - Primary       Endocrine    Type 2 diabetes mellitus (CMS/[facility])            PLAN  Her hemoglobin A1c shows good control of her type 2 diabetes with percentage of 6.9%.    I would like to have her back in about 4 months to reassess her labs about a week before: Hemoglobin A1c, comprehensive metabolic panel, CBC, urinalysis.    She should follow-up for a Medicare wellness visit once yearly.  No follow-ups on file.

## 2020-08-19 RX ORDER — METFORMIN HYDROCHLORIDE 500 MG/1
TABLET, EXTENDED RELEASE ORAL
Qty: 180 TABLET | Refills: 3 | Status: SHIPPED | OUTPATIENT
Start: 2020-08-19 | End: 2021-08-26

## 2020-09-29 RX ORDER — ATORVASTATIN CALCIUM 10 MG/1
10 TABLET, FILM COATED ORAL DAILY
Qty: 90 TABLET | Refills: 3 | Status: SHIPPED | OUTPATIENT
Start: 2020-09-29 | End: 2022-03-15

## 2020-11-30 DIAGNOSIS — I27.20 PULMONARY HYPERTENSION (HCC): ICD-10-CM

## 2020-11-30 DIAGNOSIS — E11.59 TYPE 2 DIABETES MELLITUS WITH OTHER CIRCULATORY COMPLICATION, WITHOUT LONG-TERM CURRENT USE OF INSULIN (HCC): ICD-10-CM

## 2020-11-30 DIAGNOSIS — E78.00 PURE HYPERCHOLESTEROLEMIA: Primary | ICD-10-CM

## 2020-12-02 DIAGNOSIS — I27.20 PULMONARY HYPERTENSION (HCC): ICD-10-CM

## 2020-12-02 DIAGNOSIS — E78.00 PURE HYPERCHOLESTEROLEMIA: Primary | ICD-10-CM

## 2020-12-02 LAB
ALBUMIN SERPL-MCNC: 4.4 G/DL (ref 3.5–5.2)
ALBUMIN/GLOB SERPL: 1.6 G/DL
ALP SERPL-CCNC: 69 U/L (ref 39–117)
ALT SERPL-CCNC: 18 U/L (ref 1–33)
AST SERPL-CCNC: 25 U/L (ref 1–32)
BASOPHILS # BLD AUTO: 0.02 10*3/MM3 (ref 0–0.2)
BASOPHILS NFR BLD AUTO: 0.3 % (ref 0–1.5)
BILIRUB SERPL-MCNC: 0.7 MG/DL (ref 0–1.2)
BUN SERPL-MCNC: 24 MG/DL (ref 8–23)
BUN/CREAT SERPL: 32.4 (ref 7–25)
CALCIUM SERPL-MCNC: 9.7 MG/DL (ref 8.6–10.5)
CHLORIDE SERPL-SCNC: 100 MMOL/L (ref 98–107)
CHOLEST SERPL-MCNC: 138 MG/DL (ref 0–200)
CO2 SERPL-SCNC: 28.3 MMOL/L (ref 22–29)
CREAT SERPL-MCNC: 0.74 MG/DL (ref 0.57–1)
EOSINOPHIL # BLD AUTO: 0.08 10*3/MM3 (ref 0–0.4)
EOSINOPHIL NFR BLD AUTO: 1.1 % (ref 0.3–6.2)
ERYTHROCYTE [DISTWIDTH] IN BLOOD BY AUTOMATED COUNT: 12 % (ref 12.3–15.4)
GLOBULIN SER CALC-MCNC: 2.7 GM/DL
GLUCOSE SERPL-MCNC: 153 MG/DL (ref 65–99)
HBA1C MFR BLD: 7 % (ref 4.8–5.6)
HCT VFR BLD AUTO: 40.5 % (ref 34–46.6)
HDLC SERPL-MCNC: 88 MG/DL (ref 40–60)
HGB BLD-MCNC: 13.6 G/DL (ref 12–15.9)
IMM GRANULOCYTES # BLD AUTO: 0.02 10*3/MM3 (ref 0–0.05)
IMM GRANULOCYTES NFR BLD AUTO: 0.3 % (ref 0–0.5)
LDLC SERPL CALC-MCNC: 36 MG/DL (ref 0–100)
LDLC/HDLC SERPL: 0.41 {RATIO}
LYMPHOCYTES # BLD AUTO: 0.94 10*3/MM3 (ref 0.7–3.1)
LYMPHOCYTES NFR BLD AUTO: 13.4 % (ref 19.6–45.3)
MCH RBC QN AUTO: 30.2 PG (ref 26.6–33)
MCHC RBC AUTO-ENTMCNC: 33.6 G/DL (ref 31.5–35.7)
MCV RBC AUTO: 90 FL (ref 79–97)
MONOCYTES # BLD AUTO: 0.4 10*3/MM3 (ref 0.1–0.9)
MONOCYTES NFR BLD AUTO: 5.7 % (ref 5–12)
NEUTROPHILS # BLD AUTO: 5.54 10*3/MM3 (ref 1.7–7)
NEUTROPHILS NFR BLD AUTO: 79.2 % (ref 42.7–76)
NRBC BLD AUTO-RTO: 0 /100 WBC (ref 0–0.2)
PLATELET # BLD AUTO: 199 10*3/MM3 (ref 140–450)
POTASSIUM SERPL-SCNC: 4.2 MMOL/L (ref 3.5–5.2)
PROT SERPL-MCNC: 7.1 G/DL (ref 6–8.5)
RBC # BLD AUTO: 4.5 10*6/MM3 (ref 3.77–5.28)
SODIUM SERPL-SCNC: 141 MMOL/L (ref 136–145)
TRIGL SERPL-MCNC: 71 MG/DL (ref 0–150)
UNABLE TO VOID: NORMAL
VLDLC SERPL CALC-MCNC: 14 MG/DL (ref 5–40)
WBC # BLD AUTO: 7 10*3/MM3 (ref 3.4–10.8)

## 2020-12-08 ENCOUNTER — OFFICE VISIT (OUTPATIENT)
Dept: FAMILY MEDICINE CLINIC | Facility: CLINIC | Age: 85
End: 2020-12-08

## 2020-12-08 VITALS
OXYGEN SATURATION: 100 % | TEMPERATURE: 98 F | HEIGHT: 61 IN | WEIGHT: 100.6 LBS | BODY MASS INDEX: 18.99 KG/M2 | HEART RATE: 59 BPM | DIASTOLIC BLOOD PRESSURE: 80 MMHG | RESPIRATION RATE: 18 BRPM | SYSTOLIC BLOOD PRESSURE: 136 MMHG

## 2020-12-08 DIAGNOSIS — E11.59 TYPE 2 DIABETES MELLITUS WITH OTHER CIRCULATORY COMPLICATION, WITHOUT LONG-TERM CURRENT USE OF INSULIN (HCC): ICD-10-CM

## 2020-12-08 DIAGNOSIS — E78.00 PURE HYPERCHOLESTEROLEMIA: Primary | ICD-10-CM

## 2020-12-08 PROCEDURE — 99214 OFFICE O/P EST MOD 30 MIN: CPT | Performed by: INTERNAL MEDICINE

## 2020-12-08 NOTE — PROGRESS NOTES
Subjective   Maite Pacheco is a 85 y.o. female. Patient is here today for   Chief Complaint   Patient presents with   • Hyperlipidemia     f/u labs   • Diabetes          Vitals:    12/08/20 1106   BP: 136/80   Pulse: 59   Resp: 18   Temp: 98 °F (36.7 °C)   SpO2: 100%     Body mass index is 18.7 kg/m².      Past Medical History:   Diagnosis Date   • Abnormal CT of the abdomen    • Allergic rhinitis    • Arthritis    • Chronic pancreatitis (CMS/HCC)    • Diabetes mellitus (CMS/HCC)     type 2   • Eustachian tube dysfunction    • History of pneumonia 12/2016   • Hyperglycemia    • Hyperlipidemia    • Inguinal hernia    • Neck pain    • Osteoporosis    • Pancreatic cyst    • Urinary frequency       Allergies   Allergen Reactions   • Sulfa Antibiotics Rash      Social History     Socioeconomic History   • Marital status: Single     Spouse name: Not on file   • Number of children: 0   • Years of education: COLLEGE GRADUATE   • Highest education level: Not on file   Occupational History   • Occupation: RETIRED     Employer: RETIRED   Tobacco Use   • Smoking status: Former Smoker     Packs/day: 1.00     Years: 50.00     Pack years: 50.00     Types: Cigarettes     Quit date: 12/30/2016     Years since quitting: 3.9   • Smokeless tobacco: Never Used   Substance and Sexual Activity   • Alcohol use: No   • Drug use: No   • Sexual activity: Defer        Current Outpatient Medications:   •  atorvastatin (LIPITOR) 10 MG tablet, TAKE 1 TABLET BY MOUTH  DAILY, Disp: 90 tablet, Rfl: 3  •  calcium carbonate (OS-ROSAMARIA) 600 MG tablet, Take 600 mg by mouth Daily., Disp: , Rfl:   •  cholecalciferol (VITAMIN D3) 1000 UNITS tablet, Take 1,000 Units by mouth Daily. Takes takes from October to May, Disp: , Rfl:   •  meloxicam (MOBIC) 15 MG tablet, Take 1 tablet by mouth Daily. As needed for neck pain. Take with food, Disp: 30 tablet, Rfl: 5  •  metFORMIN ER (GLUCOPHAGE-XR) 500 MG 24 hr tablet, TAKE 1 TABLET BY MOUTH TWO  TIMES DAILY, Disp: 180  tablet, Rfl: 3  •  MULTIPLE VITAMINS PO, Take 1 tablet by mouth Daily., Disp: , Rfl:   •  Umeclidinium-Vilanterol (ANORO ELLIPTA) 62.5-25 MCG/INH aerosol powder , Inhale 1 puff Daily., Disp: , Rfl:      Objective     This patient is here to follow-up on labs from last week.    She has no complaints.  She has lost 5 pounds since our last.  She only weighs 100 pounds.    Hyperlipidemia    Diabetes         Review of Systems   Constitutional: Negative.    HENT: Negative.    Respiratory: Negative.    Cardiovascular: Negative.    Musculoskeletal: Negative.    Psychiatric/Behavioral: Negative.        Physical Exam  Vitals signs and nursing note reviewed.   Constitutional:       Appearance: Normal appearance.      Comments: Pleasant, neatly groomed, in no distress.   Cardiovascular:      Rate and Rhythm: Normal rate and regular rhythm.      Heart sounds: Normal heart sounds. No murmur. No gallop.    Pulmonary:      Effort: No respiratory distress.      Breath sounds: Normal breath sounds. No wheezing or rales.   Neurological:      Mental Status: She is alert and oriented to person, place, and time.   Psychiatric:         Mood and Affect: Mood normal.         Behavior: Behavior normal.         Thought Content: Thought content normal.         Judgment: Judgment normal.           Problems Addressed this Visit        Cardiovascular and Mediastinum    Hyperlipidemia - Primary    Relevant Orders    Urinalysis With Microscopic - Urine, Clean Catch       Endocrine    Type 2 diabetes mellitus (CMS/[facility])      Diagnoses       Codes Comments    Pure hypercholesterolemia    -  Primary ICD-10-CM: E78.00  ICD-9-CM: 272.0     Type 2 diabetes mellitus with other circulatory complication, without long-term current use of insulin (CMS/[facility])     ICD-10-CM: E11.59  ICD-9-CM: 250.70             PLAN  She has type 2 diabetes.    She has relatively good control of her type 2 diabetes with hemoglobin A1c is 7% last week.    She has  hypercholesterolemia.  This is well controlled on atorvastatin 10 mg daily.    I asked her to follow-up in about 4 months.  Fasting labs prior to that visit should include: Lipid profile, comprehensive metabolic panel, hemoglobin A1c, CBC, urine microalbumin.  No follow-ups on file.

## 2020-12-09 LAB
APPEARANCE UR: ABNORMAL
BACTERIA #/AREA URNS HPF: ABNORMAL /[HPF]
BILIRUB UR QL STRIP: NEGATIVE
COLOR UR: YELLOW
CRYSTALS URNS MICRO: ABNORMAL
EPI CELLS #/AREA URNS HPF: ABNORMAL /HPF (ref 0–10)
GLUCOSE UR QL: NEGATIVE
HGB UR QL STRIP: NEGATIVE
KETONES UR QL STRIP: NEGATIVE
LEUKOCYTE ESTERASE UR QL STRIP: NEGATIVE
MICRO URNS: ABNORMAL
MICRO URNS: ABNORMAL
NITRITE UR QL STRIP: NEGATIVE
PH UR STRIP: 7.5 [PH] (ref 5–7.5)
PROT UR QL STRIP: NEGATIVE
RBC #/AREA URNS HPF: ABNORMAL /HPF (ref 0–2)
SP GR UR: 1.02 (ref 1–1.03)
UNIDENT CRYS URNS QL MICRO: PRESENT
UROBILINOGEN UR STRIP-MCNC: 0.2 MG/DL (ref 0.2–1)
WBC #/AREA URNS HPF: ABNORMAL /HPF (ref 0–5)

## 2021-03-03 DIAGNOSIS — E11.59 TYPE 2 DIABETES MELLITUS WITH OTHER CIRCULATORY COMPLICATION, WITHOUT LONG-TERM CURRENT USE OF INSULIN (HCC): ICD-10-CM

## 2021-03-03 DIAGNOSIS — E78.00 PURE HYPERCHOLESTEROLEMIA: Primary | ICD-10-CM

## 2021-03-17 LAB
ALBUMIN SERPL-MCNC: 4.3 G/DL (ref 3.5–5.2)
ALBUMIN/GLOB SERPL: 1.7 G/DL
ALP SERPL-CCNC: 69 U/L (ref 39–117)
ALT SERPL-CCNC: 17 U/L (ref 1–33)
AST SERPL-CCNC: 27 U/L (ref 1–32)
BASOPHILS # BLD AUTO: 0.02 10*3/MM3 (ref 0–0.2)
BASOPHILS NFR BLD AUTO: 0.3 % (ref 0–1.5)
BILIRUB SERPL-MCNC: 0.6 MG/DL (ref 0–1.2)
BUN SERPL-MCNC: 19 MG/DL (ref 8–23)
BUN/CREAT SERPL: 27.9 (ref 7–25)
CALCIUM SERPL-MCNC: 10.2 MG/DL (ref 8.6–10.5)
CHLORIDE SERPL-SCNC: 101 MMOL/L (ref 98–107)
CHOLEST SERPL-MCNC: 137 MG/DL (ref 0–200)
CO2 SERPL-SCNC: 27.7 MMOL/L (ref 22–29)
CREAT SERPL-MCNC: 0.68 MG/DL (ref 0.57–1)
EOSINOPHIL # BLD AUTO: 0.11 10*3/MM3 (ref 0–0.4)
EOSINOPHIL NFR BLD AUTO: 1.9 % (ref 0.3–6.2)
ERYTHROCYTE [DISTWIDTH] IN BLOOD BY AUTOMATED COUNT: 12 % (ref 12.3–15.4)
GLOBULIN SER CALC-MCNC: 2.6 GM/DL
GLUCOSE SERPL-MCNC: 144 MG/DL (ref 65–99)
HBA1C MFR BLD: 6.9 % (ref 4.8–5.6)
HCT VFR BLD AUTO: 39.4 % (ref 34–46.6)
HDLC SERPL-MCNC: 83 MG/DL (ref 40–60)
HGB BLD-MCNC: 13.1 G/DL (ref 12–15.9)
IMM GRANULOCYTES # BLD AUTO: 0.01 10*3/MM3 (ref 0–0.05)
IMM GRANULOCYTES NFR BLD AUTO: 0.2 % (ref 0–0.5)
LDLC SERPL CALC-MCNC: 42 MG/DL (ref 0–100)
LDLC/HDLC SERPL: 0.52 {RATIO}
LYMPHOCYTES # BLD AUTO: 1.07 10*3/MM3 (ref 0.7–3.1)
LYMPHOCYTES NFR BLD AUTO: 18.6 % (ref 19.6–45.3)
MCH RBC QN AUTO: 29.9 PG (ref 26.6–33)
MCHC RBC AUTO-ENTMCNC: 33.2 G/DL (ref 31.5–35.7)
MCV RBC AUTO: 90 FL (ref 79–97)
MONOCYTES # BLD AUTO: 0.4 10*3/MM3 (ref 0.1–0.9)
MONOCYTES NFR BLD AUTO: 7 % (ref 5–12)
NEUTROPHILS # BLD AUTO: 4.13 10*3/MM3 (ref 1.7–7)
NEUTROPHILS NFR BLD AUTO: 72 % (ref 42.7–76)
NRBC BLD AUTO-RTO: 0 /100 WBC (ref 0–0.2)
PLATELET # BLD AUTO: 176 10*3/MM3 (ref 140–450)
POTASSIUM SERPL-SCNC: 3.9 MMOL/L (ref 3.5–5.2)
PROT SERPL-MCNC: 6.9 G/DL (ref 6–8.5)
RBC # BLD AUTO: 4.38 10*6/MM3 (ref 3.77–5.28)
SODIUM SERPL-SCNC: 139 MMOL/L (ref 136–145)
TRIGL SERPL-MCNC: 55 MG/DL (ref 0–150)
UNABLE TO VOID: NORMAL
VLDLC SERPL CALC-MCNC: 12 MG/DL (ref 5–40)
WBC # BLD AUTO: 5.74 10*3/MM3 (ref 3.4–10.8)

## 2021-03-23 ENCOUNTER — OFFICE VISIT (OUTPATIENT)
Dept: FAMILY MEDICINE CLINIC | Facility: CLINIC | Age: 86
End: 2021-03-23

## 2021-03-23 VITALS
TEMPERATURE: 97.4 F | HEIGHT: 61 IN | SYSTOLIC BLOOD PRESSURE: 104 MMHG | RESPIRATION RATE: 16 BRPM | HEART RATE: 93 BPM | WEIGHT: 99.2 LBS | DIASTOLIC BLOOD PRESSURE: 68 MMHG | OXYGEN SATURATION: 94 % | BODY MASS INDEX: 18.73 KG/M2

## 2021-03-23 DIAGNOSIS — E11.59 TYPE 2 DIABETES MELLITUS WITH OTHER CIRCULATORY COMPLICATION, WITHOUT LONG-TERM CURRENT USE OF INSULIN (HCC): ICD-10-CM

## 2021-03-23 DIAGNOSIS — J42 CHRONIC BRONCHITIS, UNSPECIFIED CHRONIC BRONCHITIS TYPE (HCC): ICD-10-CM

## 2021-03-23 DIAGNOSIS — E78.00 PURE HYPERCHOLESTEROLEMIA: Primary | ICD-10-CM

## 2021-03-23 PROCEDURE — 99214 OFFICE O/P EST MOD 30 MIN: CPT | Performed by: INTERNAL MEDICINE

## 2021-03-23 NOTE — PROGRESS NOTES
Subjective   Maite Pacheco is a 85 y.o. female. Patient is here today for   Chief Complaint   Patient presents with   • Type 2 diabetes mellitus   • pure hypercholesteroiemia          Vitals:    21 1004   BP: 104/68   Pulse: 93   Resp: 16   Temp: 97.4 °F (36.3 °C)   SpO2: 94%     Body mass index is 18.44 kg/m².      Past Medical History:   Diagnosis Date   • Abnormal CT of the abdomen    • Allergic rhinitis    • Arthritis    • Chronic pancreatitis (CMS/HCC)    • Diabetes mellitus (CMS/HCC)     type 2   • Eustachian tube dysfunction    • History of pneumonia 2016   • Hyperglycemia    • Hyperlipidemia    • Inguinal hernia    • Neck pain    • Osteoporosis    • Pancreatic cyst    • Urinary frequency       Allergies   Allergen Reactions   • Sulfa Antibiotics Rash      Social History     Socioeconomic History   • Marital status: Single     Spouse name: Not on file   • Number of children: 0   • Years of education: COLLEGE GRADUATE   • Highest education level: Not on file   Tobacco Use   • Smoking status: Former Smoker     Packs/day: 1.00     Years: 50.00     Pack years: 50.00     Types: Cigarettes     Quit date: 2016     Years since quittin.2   • Smokeless tobacco: Never Used   Substance and Sexual Activity   • Alcohol use: No   • Drug use: No   • Sexual activity: Defer        Current Outpatient Medications:   •  atorvastatin (LIPITOR) 10 MG tablet, TAKE 1 TABLET BY MOUTH  DAILY, Disp: 90 tablet, Rfl: 3  •  calcium carbonate (OS-ROSAMARIA) 600 MG tablet, Take 600 mg by mouth Daily., Disp: , Rfl:   •  cholecalciferol (VITAMIN D3) 1000 UNITS tablet, Take 1,000 Units by mouth Daily. Takes takes from October to May, Disp: , Rfl:   •  metFORMIN ER (GLUCOPHAGE-XR) 500 MG 24 hr tablet, TAKE 1 TABLET BY MOUTH TWO  TIMES DAILY, Disp: 180 tablet, Rfl: 3  •  MULTIPLE VITAMINS PO, Take 1 tablet by mouth Daily., Disp: , Rfl:   •  Umeclidinium-Vilanterol (ANORO ELLIPTA) 62.5-25 MCG/INH aerosol powder , Inhale 1 puff Daily.,  Disp: , Rfl:      Objective     This pleasant 85-year-old woman is here to follow-up on her type 2 diabetes.    She tells me that she feels well.  Insists upon following up more often than twice yearly.    She has no complaints.       Review of Systems   Constitutional: Negative.    HENT: Negative.    Respiratory: Negative.    Cardiovascular: Negative.    Musculoskeletal: Negative.    Psychiatric/Behavioral: Negative.        Physical Exam  Vitals and nursing note reviewed.   Constitutional:       General: She is not in acute distress.     Appearance: Normal appearance. She is not ill-appearing, toxic-appearing or diaphoretic.      Comments: Resident, neatly groomed, in no distress.   Neck:      Vascular: No carotid bruit.   Cardiovascular:      Rate and Rhythm: Normal rate and regular rhythm.      Heart sounds: Normal heart sounds. No murmur heard.   No gallop.    Pulmonary:      Effort: No respiratory distress.      Breath sounds: Normal breath sounds. No wheezing or rales.   Neurological:      Mental Status: She is alert and oriented to person, place, and time.   Psychiatric:         Mood and Affect: Mood normal.         Behavior: Behavior normal.         Thought Content: Thought content normal.         Judgment: Judgment normal.           Problems Addressed this Visit        Cardiac and Vasculature    Hyperlipidemia - Primary       Endocrine and Metabolic    Type 2 diabetes mellitus (CMS/HCC)       Pulmonary and Pneumonias    RESOLVED: Chronic bronchitis (CMS/HCC)      Diagnoses       Codes Comments    Pure hypercholesterolemia    -  Primary ICD-10-CM: E78.00  ICD-9-CM: 272.0     Type 2 diabetes mellitus with other circulatory complication, without long-term current use of insulin (CMS/HCC)     ICD-10-CM: E11.59  ICD-9-CM: 250.70     Chronic bronchitis, unspecified chronic bronchitis type (CMS/HCC)     ICD-10-CM: J42  ICD-9-CM: 491.9             PLAN    I reviewed her labs.  She has excellent control of her  hypercholesterolemia on atorvastatin 10 mg every other day.    She has a history of chronic bronchitis.  She is taking Anoro Ellipta given to her by her pulmonologist.  She feels that her chronic bronchitis is well controlled.    I asked her to follow-up in 3 to 4 months.  She told me that she preferred to follow-up every 6 months.  The control that she has of her hypercholesterolemia and type 2 diabetes is pretty good.  She may follow-up in 6 months.    She should follow-up for an annual wellness visit once yearly.  No follow-ups on file.

## 2021-06-05 ENCOUNTER — HOSPITAL ENCOUNTER (EMERGENCY)
Facility: HOSPITAL | Age: 86
Discharge: HOME | End: 2021-06-05
Attending: EMERGENCY MEDICINE | Admitting: EMERGENCY MEDICINE

## 2021-06-05 VITALS
DIASTOLIC BLOOD PRESSURE: 93 MMHG | HEIGHT: 63 IN | SYSTOLIC BLOOD PRESSURE: 162 MMHG | RESPIRATION RATE: 18 BRPM | HEART RATE: 120 BPM | TEMPERATURE: 98.6 F | OXYGEN SATURATION: 94 % | BODY MASS INDEX: 17.57 KG/M2

## 2021-06-05 DIAGNOSIS — S01.20XA: Primary | ICD-10-CM

## 2021-06-05 PROCEDURE — 99282 EMERGENCY DEPT VISIT SF MDM: CPT

## 2021-06-05 RX ADMIN — SILVER NITRATE APPLICATORS 1 APPLICATION: 25; 75 STICK TOPICAL at 19:57

## 2021-06-05 NOTE — ED PROVIDER NOTES
EMERGENCY DEPARTMENT ENCOUNTER    Room Number:  22/22  Date of encounter:  6/6/2021  PCP: Rhys Thomas MD  Historian: Patient     I used full protective equipment while examining this patient.  This includes face mask, gloves and protective eyewear.  I washed my hands before entering the room and immediately upon leaving the room.  Patient was wearing a surgical mask.      HPI:  Chief Complaint: Bleeding from right nostril  A complete HPI/ROS/PMH/PSH/SH/FH are unobtainable due to: None    Context: Maite Pacheco is a 86 y.o. female who presents to the ED c/o bleeding from her right nostril.  Patient states that around 1130 this morning, she was trying to trim her nose hairs using a small pair of scissors when she accidentally cut the inside of her right nostril.  Reports small amount of constant bleeding since then.  She is not on any anticoagulants or aspirin.  Denies dizziness, fainting, chest pain, or shortness of breath.      PAST MEDICAL HISTORY  Active Ambulatory Problems     Diagnosis Date Noted   • Type 2 diabetes mellitus (CMS/HCC) 04/05/2016   • Hyperlipidemia 04/05/2016   • Osteoporosis 04/05/2016   • Cyst of pancreas 04/05/2016   • Hernia of abdominal wall 06/16/2016   • Acidosis 12/30/2016   • Pulmonary hypertension (CMS/HCC) 02/20/2017   • PVC (premature ventricular contraction) 03/04/2017   • Abnormal stress echo 10/12/2017     Resolved Ambulatory Problems     Diagnosis Date Noted   • Abnormal computed tomography scan 04/05/2016   • Chronic pancreatitis (CMS/HCC) 04/05/2016   • Dysfunction of eustachian tube 04/05/2016   • Right inguinal hernia 08/22/2016   • Tobacco use 08/22/2016   • Pneumonia involving left lung 12/30/2016   • Sepsis (CMS/HCC) 12/30/2016   • Tachycardia 12/30/2016   • Acute respiratory failure with hypoxia (CMS/HCC) 12/30/2016   • Anemia 02/20/2017   • Elevated liver function tests 12/21/2017   • Chronic bronchitis (CMS/HCC) 03/26/2018   • Chest wall pain 04/11/2018     Past  Medical History:   Diagnosis Date   • Abnormal CT of the abdomen    • Allergic rhinitis    • Arthritis    • Diabetes mellitus (CMS/HCC)    • Eustachian tube dysfunction    • History of pneumonia 2016   • Hyperglycemia    • Inguinal hernia    • Neck pain    • Pancreatic cyst    • Urinary frequency          PAST SURGICAL HISTORY  Past Surgical History:   Procedure Laterality Date   • APPENDECTOMY N/A 1943   • CARDIAC CATHETERIZATION N/A 10/25/2017    Procedure: Coronary angiography;  Surgeon: Gustavo Flores MD;  Location:  [facility];  Service:    • CARDIAC CATHETERIZATION N/A 10/25/2017    Procedure: Left heart cath;  Surgeon: Gustavo Flores MD;  Location:  [facility];  Service:    • CARDIAC CATHETERIZATION N/A 10/25/2017    Procedure: Left ventriculography;  Surgeon: Gustavo Flores MD;  Location:  [facility];  Service:    • COLONOSCOPY N/A 2003    Colonoscopy to the cecum, terminal ileoscopy, normal-Dr. Zahraa Brito    • INGUINAL HERNIA REPAIR Left 2008    Open left inguinal hernia repair with mesh-Dr. Zahraa Brito          FAMILY HISTORY  Family History   Problem Relation Age of Onset   • Stroke Mother    • Heart disease Mother          SOCIAL HISTORY  Social History     Socioeconomic History   • Marital status: Single     Spouse name: Not on file   • Number of children: 0   • Years of education: COLLEGE GRADUATE   • Highest education level: Not on file   Tobacco Use   • Smoking status: Former Smoker     Packs/day: 1.00     Years: 50.00     Pack years: 50.00     Types: Cigarettes     Quit date: 2016     Years since quittin.4   • Smokeless tobacco: Never Used   Substance and Sexual Activity   • Alcohol use: No   • Drug use: No   • Sexual activity: Defer         ALLERGIES  Sulfa antibiotics       REVIEW OF SYSTEMS  Review of Systems      All systems have been reviewed and are negative except as as discussed in the  HPI    PHYSICAL EXAM    I have reviewed the triage vital signs and nursing notes.    ED Triage Vitals   Temp Heart Rate Resp BP SpO2   06/05/21 1848 06/05/21 1849 06/05/21 1848 06/05/21 1922 06/05/21 1849   98.6 °F (37 °C) 120 18 (!) 180/105 (!) 89 %      Temp src Heart Rate Source Patient Position BP Location FiO2 (%)   06/05/21 1848 -- -- -- --   Tympanic           Physical Exam  GENERAL: Awake, alert, no acute distress  HENT: NCAT, nares patent; there is a punctate area of bleeding coming from the anterior septum of the right nare; no blood in the posterior oropharynx  NECK: supple  EYES: Extraocular muscles intact  CV: regular rhythm, regular rate  RESPIRATORY: normal effort, clear to auscultation bilaterally  MUSCULOSKELETAL: Extremities are without obvious deformity.    NEURO:  Speech and mentation are unremarkable.    SKIN: warm, dry, no rash  PSYCH: Normal mood and affect      LAB RESULTS  No results found for this or any previous visit (from the past 24 hour(s)).    Ordered the above labs and independently reviewed the results.      RADIOLOGY  No Radiology Exams Resulted Within Past 24 Hours    I ordered the above noted radiological studies. Reviewed by me and discussed with radiologist.  See dictation for official radiology interpretation.      PROCEDURES  Procedures      MEDICATIONS GIVEN IN ER    Medications   silver nitrate 75-25 % applicator 1 application (1 application Topical Given by Other 6/5/21 1957)         PROGRESS, DATA ANALYSIS, CONSULTS, AND MEDICAL DECISION MAKING    All labs have been independently reviewed by me.  All radiology studies have been reviewed by me and discussed with radiologist dictating the report.   EKG's independently viewed and interpreted by me.  I have reviewed the nurse's notes, vital signs, past medical history, and medication list.  Discussion below represents my analysis of pertinent findings related to patient's condition, differential diagnosis, treatment plan and  final disposition.      ED Course as of Jun 06 0227   Sat Jun 05, 2021 1954 I chemically cauterized the area of bleeding in the anterior right nare using silver nitrate.  There is no further bleeding.    []   2017 There has been no further bleeding from the patient's nose.  She will be discharged.    []      ED Course User Index  [WH] Juan Rocha MD       AS OF 02:27 EDT VITALS:    BP - 162/93  HR - 120  TEMP - 98.6 °F (37 °C) (Tympanic)  O2 SATS - 94%      DIAGNOSIS  Final diagnoses:   Open wound of nasal septum, initial encounter  Nasal bleeding           DISPOSITION  Discharge    DISCHARGE    Patient discharged in stable condition.    Reviewed implications of results, diagnosis, meds, responsibility to follow up, warning signs and symptoms of possible worsening, potential complications and reasons to return to ER, including worsening or persistent bleeding..    Patient/Family voiced understanding of above instructions.    Discussed plan for discharge, as there is no emergent indication for admission. Patient referred to primary care provider for BP management due to today's BP. Pt/family is agreeable and understands need for follow up and repeat testing.  Pt is aware that discharge does not mean that nothing is wrong but it indicates no emergency is present that requires admission and they must continue care with follow-up as given below or physician of their choice.     FOLLOW-UP  Rhys Thomas MD  [id number] [id number] [phone]    Schedule an appointment as soon as possible for a visit   As needed         Medication List      No changes were made to your prescriptions during this visit.           Dictated utilizing Dragon dictation:  Much of this encounter note is an electronic transcription/translation of spoken language to printed text. The electronic translation of spoken language may permit erroneous, or at times, nonsensical words or phrases to be inadvertently  transcribed; Although I have reviewed the note for such errors, some may still exist.     Juan Rocha MD  06/06/21 0227

## 2021-06-05 NOTE — ED TRIAGE NOTES
Pt to ER via [facility]. Pt states she was attempting to trim her nose hairs and cut herself with scissors in her right nostril. Pt states this occurred at 1130 this morning. Denies blood thinners    Pt states hx of COPD. 89% on RA in triage. Pt denies SOA     Patient in mask. This RN in appropriate PPE - including mask, goggles, and gloves during all of patient care.

## 2021-06-06 NOTE — DISCHARGE INSTRUCTIONS
Do not scratch or pick at the affected area in your right nostril.  Return to the emergency department for recurrent bleeding or other concern.

## 2021-08-04 ENCOUNTER — TELEPHONE (OUTPATIENT)
Dept: FAMILY MEDICINE CLINIC | Facility: CLINIC | Age: 86
End: 2021-08-04

## 2021-08-04 DIAGNOSIS — Z12.31 SCREENING MAMMOGRAM, ENCOUNTER FOR: Primary | ICD-10-CM

## 2021-08-04 NOTE — TELEPHONE ENCOUNTER
Caller: Maite Pacheco    Relationship: Self    Best call back number: [phone]     What orders are you requesting (i.e. lab or imaging): SCREENING MAMMOGRAM    In what timeframe would the patient need to come in: ASAP    Where will you receive your lab/imaging services: [facility]    Additional notes: PATIENT STATES SHE HAS GOTTEN HER PREVIOUS MAMMOGRAMS AT [facility], BUT WANTS TO GET AT [facility].  SHE NEEDS AN ORDER FAXED TO [facility].    PLEASE ADVISE.

## 2021-08-06 ENCOUNTER — TRANSCRIBE ORDERS (OUTPATIENT)
Dept: ADMINISTRATIVE | Facility: HOSPITAL | Age: 86
End: 2021-08-06

## 2021-08-06 DIAGNOSIS — Z12.31 VISIT FOR SCREENING MAMMOGRAM: Primary | ICD-10-CM

## 2021-08-26 RX ORDER — METFORMIN HYDROCHLORIDE 500 MG/1
TABLET, EXTENDED RELEASE ORAL
Qty: 180 TABLET | Refills: 3 | Status: SHIPPED | OUTPATIENT
Start: 2021-08-26 | End: 2022-08-22

## 2021-09-21 ENCOUNTER — HOSPITAL ENCOUNTER (OUTPATIENT)
Dept: MAMMOGRAPHY | Facility: HOSPITAL | Age: 86
Discharge: HOME | End: 2021-09-21
Admitting: INTERNAL MEDICINE

## 2021-09-21 DIAGNOSIS — Z12.31 VISIT FOR SCREENING MAMMOGRAM: ICD-10-CM

## 2021-09-21 PROCEDURE — 77067 SCR MAMMO BI INCL CAD: CPT

## 2021-09-21 PROCEDURE — 77063 BREAST TOMOSYNTHESIS BI: CPT

## 2021-09-22 DIAGNOSIS — E11.59 TYPE 2 DIABETES MELLITUS WITH OTHER CIRCULATORY COMPLICATION, WITHOUT LONG-TERM CURRENT USE OF INSULIN (HCC): Primary | ICD-10-CM

## 2021-09-22 DIAGNOSIS — E78.00 PURE HYPERCHOLESTEROLEMIA: ICD-10-CM

## 2021-09-22 DIAGNOSIS — I27.20 PULMONARY HYPERTENSION (HCC): ICD-10-CM

## 2021-09-24 LAB
ALBUMIN SERPL-MCNC: 4.8 G/DL (ref 3.5–5.2)
ALBUMIN/CREAT UR: 43 MG/G CREAT (ref 0–29)
ALBUMIN/GLOB SERPL: 2.5 G/DL
ALP SERPL-CCNC: 65 U/L (ref 39–117)
ALT SERPL-CCNC: 15 U/L (ref 1–33)
APPEARANCE UR: CLEAR
AST SERPL-CCNC: 24 U/L (ref 1–32)
BACTERIA #/AREA URNS HPF: NORMAL /HPF
BASOPHILS # BLD AUTO: 0.01 10*3/MM3 (ref 0–0.2)
BASOPHILS NFR BLD AUTO: 0.2 % (ref 0–1.5)
BILIRUB SERPL-MCNC: 0.5 MG/DL (ref 0–1.2)
BILIRUB UR QL STRIP: NEGATIVE
BUN SERPL-MCNC: 26 MG/DL (ref 8–23)
BUN/CREAT SERPL: 39.4 (ref 7–25)
CALCIUM SERPL-MCNC: 10 MG/DL (ref 8.6–10.5)
CASTS URNS MICRO: NORMAL
CHLORIDE SERPL-SCNC: 105 MMOL/L (ref 98–107)
CHOLEST SERPL-MCNC: 130 MG/DL (ref 0–200)
CO2 SERPL-SCNC: 29.6 MMOL/L (ref 22–29)
COLOR UR: YELLOW
CREAT SERPL-MCNC: 0.66 MG/DL (ref 0.57–1)
CREAT UR-MCNC: 55 MG/DL
EOSINOPHIL # BLD AUTO: 0.2 10*3/MM3 (ref 0–0.4)
EOSINOPHIL NFR BLD AUTO: 3.6 % (ref 0.3–6.2)
EPI CELLS #/AREA URNS HPF: NORMAL /HPF
ERYTHROCYTE [DISTWIDTH] IN BLOOD BY AUTOMATED COUNT: 12.3 % (ref 12.3–15.4)
GLOBULIN SER CALC-MCNC: 1.9 GM/DL
GLUCOSE SERPL-MCNC: 149 MG/DL (ref 65–99)
GLUCOSE UR QL: NEGATIVE
HCT VFR BLD AUTO: 39.9 % (ref 34–46.6)
HDLC SERPL-MCNC: 76 MG/DL (ref 40–60)
HGB BLD-MCNC: 13.2 G/DL (ref 12–15.9)
HGB UR QL STRIP: NEGATIVE
IMM GRANULOCYTES # BLD AUTO: 0.02 10*3/MM3 (ref 0–0.05)
IMM GRANULOCYTES NFR BLD AUTO: 0.4 % (ref 0–0.5)
KETONES UR QL STRIP: NEGATIVE
LDLC SERPL CALC-MCNC: 43 MG/DL (ref 0–100)
LEUKOCYTE ESTERASE UR QL STRIP: NEGATIVE
LYMPHOCYTES # BLD AUTO: 1.09 10*3/MM3 (ref 0.7–3.1)
LYMPHOCYTES NFR BLD AUTO: 19.9 % (ref 19.6–45.3)
MCH RBC QN AUTO: 30.1 PG (ref 26.6–33)
MCHC RBC AUTO-ENTMCNC: 33.1 G/DL (ref 31.5–35.7)
MCV RBC AUTO: 91.1 FL (ref 79–97)
MICROALBUMIN UR-MCNC: 23.9 UG/ML
MONOCYTES # BLD AUTO: 0.44 10*3/MM3 (ref 0.1–0.9)
MONOCYTES NFR BLD AUTO: 8 % (ref 5–12)
NEUTROPHILS # BLD AUTO: 3.72 10*3/MM3 (ref 1.7–7)
NEUTROPHILS NFR BLD AUTO: 67.9 % (ref 42.7–76)
NITRITE UR QL STRIP: NEGATIVE
NRBC BLD AUTO-RTO: 0 /100 WBC (ref 0–0.2)
PH UR STRIP: 7 [PH] (ref 5–8)
PLATELET # BLD AUTO: 174 10*3/MM3 (ref 140–450)
POTASSIUM SERPL-SCNC: 3.6 MMOL/L (ref 3.5–5.2)
PROT SERPL-MCNC: 6.7 G/DL (ref 6–8.5)
PROT UR QL STRIP: NEGATIVE
RBC # BLD AUTO: 4.38 10*6/MM3 (ref 3.77–5.28)
RBC #/AREA URNS HPF: NORMAL /HPF
SODIUM SERPL-SCNC: 143 MMOL/L (ref 136–145)
SP GR UR: 1.02 (ref 1–1.03)
TRIGL SERPL-MCNC: 49 MG/DL (ref 0–150)
UROBILINOGEN UR STRIP-MCNC: NORMAL MG/DL
VLDLC SERPL CALC-MCNC: 11 MG/DL (ref 5–40)
WBC # BLD AUTO: 5.48 10*3/MM3 (ref 3.4–10.8)
WBC #/AREA URNS HPF: NORMAL /HPF

## 2021-09-30 ENCOUNTER — OFFICE VISIT (OUTPATIENT)
Dept: FAMILY MEDICINE CLINIC | Facility: CLINIC | Age: 86
End: 2021-09-30

## 2021-09-30 VITALS
RESPIRATION RATE: 18 BRPM | DIASTOLIC BLOOD PRESSURE: 74 MMHG | HEART RATE: 111 BPM | OXYGEN SATURATION: 92 % | SYSTOLIC BLOOD PRESSURE: 126 MMHG | TEMPERATURE: 97.2 F | WEIGHT: 96 LBS | HEIGHT: 63 IN | BODY MASS INDEX: 17.01 KG/M2

## 2021-09-30 DIAGNOSIS — E78.00 PURE HYPERCHOLESTEROLEMIA: ICD-10-CM

## 2021-09-30 DIAGNOSIS — E11.59 TYPE 2 DIABETES MELLITUS WITH OTHER CIRCULATORY COMPLICATION, WITHOUT LONG-TERM CURRENT USE OF INSULIN (HCC): Primary | ICD-10-CM

## 2021-09-30 LAB — HBA1C MFR BLD: 7 %

## 2021-09-30 PROCEDURE — 99214 OFFICE O/P EST MOD 30 MIN: CPT | Performed by: INTERNAL MEDICINE

## 2021-09-30 PROCEDURE — 83036 HEMOGLOBIN GLYCOSYLATED A1C: CPT | Performed by: INTERNAL MEDICINE

## 2021-09-30 NOTE — PROGRESS NOTES
Subjective   Maite Pacheco is a 86 y.o. female. Patient is here today for   Chief Complaint   Patient presents with   • Hyperlipidemia     lab follow up          Vitals:    21 0953   BP: 126/74   Pulse: 111   Resp: 18   Temp: 97.2 °F (36.2 °C)   SpO2: 92%     Body mass index is 17.01 kg/m².      Past Medical History:   Diagnosis Date   • Abnormal CT of the abdomen    • Allergic rhinitis    • Arthritis    • Chronic pancreatitis (CMS/HCC)    • Diabetes mellitus (CMS/HCC)     type 2   • Eustachian tube dysfunction    • History of pneumonia 2016   • Hyperglycemia    • Hyperlipidemia    • Inguinal hernia    • Neck pain    • Osteoporosis    • Pancreatic cyst    • Urinary frequency       Allergies   Allergen Reactions   • Sulfa Antibiotics Rash      Social History     Socioeconomic History   • Marital status: Single     Spouse name: Not on file   • Number of children: 0   • Years of education: COLLEGE GRADUATE   • Highest education level: Not on file   Tobacco Use   • Smoking status: Former Smoker     Packs/day: 1.00     Years: 50.00     Pack years: 50.00     Types: Cigarettes     Quit date: 2016     Years since quittin.7   • Smokeless tobacco: Never Used   Substance and Sexual Activity   • Alcohol use: No   • Drug use: No   • Sexual activity: Defer        Current Outpatient Medications:   •  atorvastatin (LIPITOR) 10 MG tablet, TAKE 1 TABLET BY MOUTH  DAILY, Disp: 90 tablet, Rfl: 3  •  calcium carbonate (OS-ROSAMARIA) 600 MG tablet, Take 600 mg by mouth Daily., Disp: , Rfl:   •  cholecalciferol (VITAMIN D3) 1000 UNITS tablet, Take 1,000 Units by mouth Daily. Takes takes from October to May, Disp: , Rfl:   •  metFORMIN ER (GLUCOPHAGE-XR) 500 MG 24 hr tablet, TAKE 1 TABLET BY MOUTH  TWICE DAILY, Disp: 180 tablet, Rfl: 3  •  MULTIPLE VITAMINS PO, Take 1 tablet by mouth Daily., Disp: , Rfl:   •  Umeclidinium-Vilanterol (ANORO ELLIPTA) 62.5-25 MCG/INH aerosol powder , Inhale 1 puff Daily., Disp: , Rfl:       Objective     She is here to follow-up on labs.    She has no complaints.       Review of Systems   Constitutional: Negative.    HENT: Negative.    Respiratory: Negative.    Cardiovascular: Negative.    Musculoskeletal: Negative.    Psychiatric/Behavioral: Negative.        Physical Exam  Vitals and nursing note reviewed.   Constitutional:       Appearance: Normal appearance.      Comments: Pleasant, neatly groomed, in no distress.   Cardiovascular:      Rate and Rhythm: Regular rhythm.      Heart sounds: Normal heart sounds. No murmur heard.   No gallop.    Pulmonary:      Effort: No respiratory distress.      Breath sounds: Normal breath sounds. No wheezing or rales.   Neurological:      Mental Status: She is alert and oriented to person, place, and time.   Psychiatric:         Mood and Affect: Mood normal.         Behavior: Behavior normal.         Thought Content: Thought content normal.           Problems Addressed this Visit        Cardiac and Vasculature    Hyperlipidemia       Endocrine and Metabolic    Type 2 diabetes mellitus (CMS/[facility]) - Primary    Relevant Orders    POC Glycosylated Hemoglobin (Hb A1C) (Completed)      Diagnoses       Codes Comments    Type 2 diabetes mellitus with other circulatory complication, without long-term current use of insulin (CMS/[facility])    -  Primary ICD-10-CM: E11.59  ICD-9-CM: 250.70     Pure hypercholesterolemia     ICD-10-CM: E78.00  ICD-9-CM: 272.0             PLAN  For diabetes is with adequate control hemoglobin A1c of 7%.    She has chronic obstructive pulmonary disease and this appears to be well controlled.  She follows up with pulmonary medicine routinely.    She is underweight and of encouraged her to do her best to gain weight if she could.  She needs to take in more calories.    I have asked her to follow-up for a Medicare wellness visit in 4 to 6 months.  Fasting labs prior to that visit should include hemoglobin A1c, comprehensive metabolic panel, CBC,  urinalysis, lipid profile, urine microalbumin.  No follow-ups on file.

## 2021-10-29 ENCOUNTER — OFFICE VISIT (OUTPATIENT)
Dept: FAMILY MEDICINE CLINIC | Facility: CLINIC | Age: 86
End: 2021-10-29

## 2021-10-29 VITALS
DIASTOLIC BLOOD PRESSURE: 86 MMHG | BODY MASS INDEX: 16.9 KG/M2 | RESPIRATION RATE: 18 BRPM | SYSTOLIC BLOOD PRESSURE: 160 MMHG | HEART RATE: 101 BPM | WEIGHT: 95.4 LBS | TEMPERATURE: 97.4 F | HEIGHT: 63 IN | OXYGEN SATURATION: 89 %

## 2021-10-29 DIAGNOSIS — Z23 NEED FOR IMMUNIZATION AGAINST INFLUENZA: ICD-10-CM

## 2021-10-29 DIAGNOSIS — L60.0 ONYCHOCRYPTOSIS: Primary | ICD-10-CM

## 2021-10-29 PROCEDURE — 99213 OFFICE O/P EST LOW 20 MIN: CPT | Performed by: STUDENT IN AN ORGANIZED HEALTH CARE EDUCATION/TRAINING PROGRAM

## 2021-10-29 PROCEDURE — G0008 ADMIN INFLUENZA VIRUS VAC: HCPCS | Performed by: STUDENT IN AN ORGANIZED HEALTH CARE EDUCATION/TRAINING PROGRAM

## 2021-10-29 PROCEDURE — 90686 IIV4 VACC NO PRSV 0.5 ML IM: CPT | Performed by: STUDENT IN AN ORGANIZED HEALTH CARE EDUCATION/TRAINING PROGRAM

## 2021-10-29 RX ORDER — ACETAMINOPHEN 160 MG
TABLET,DISINTEGRATING ORAL AS NEEDED
Qty: 236 ML | Refills: 0 | Status: SHIPPED | OUTPATIENT
Start: 2021-10-29 | End: 2022-08-25

## 2021-11-11 ENCOUNTER — TRANSCRIBE ORDERS (OUTPATIENT)
Dept: ADMINISTRATIVE | Facility: HOSPITAL | Age: 86
End: 2021-11-11

## 2021-11-11 DIAGNOSIS — Z13.6 ENCOUNTER FOR SCREENING FOR VASCULAR DISEASE: Primary | ICD-10-CM

## 2021-12-14 ENCOUNTER — OFFICE VISIT (OUTPATIENT)
Dept: FAMILY MEDICINE CLINIC | Facility: CLINIC | Age: 86
End: 2021-12-14

## 2021-12-14 VITALS
TEMPERATURE: 97.6 F | HEIGHT: 63 IN | WEIGHT: 89.6 LBS | RESPIRATION RATE: 18 BRPM | OXYGEN SATURATION: 92 % | SYSTOLIC BLOOD PRESSURE: 122 MMHG | BODY MASS INDEX: 15.88 KG/M2 | DIASTOLIC BLOOD PRESSURE: 68 MMHG | HEART RATE: 98 BPM

## 2021-12-14 DIAGNOSIS — R81 GLYCOSURIA: ICD-10-CM

## 2021-12-14 DIAGNOSIS — R30.0 DYSURIA: Primary | ICD-10-CM

## 2021-12-14 LAB
CLARITY, POC: ABNORMAL
COLOR UR: YELLOW
EXPIRATION DATE: ABNORMAL
GLUCOSE UR STRIP-MCNC: ABNORMAL MG/DL
KETONES UR QL: NEGATIVE
LEUKOCYTE EST, POC: NEGATIVE
Lab: ABNORMAL
NITRITE UR-MCNC: NEGATIVE MG/ML
PH UR: 5.5 [PH] (ref 5–8)
PROT UR STRIP-MCNC: ABNORMAL MG/DL
RBC # UR STRIP: NEGATIVE /UL
SP GR UR: 1.03 (ref 1–1.03)

## 2021-12-14 PROCEDURE — 81003 URINALYSIS AUTO W/O SCOPE: CPT | Performed by: NURSE PRACTITIONER

## 2021-12-14 PROCEDURE — 99212 OFFICE O/P EST SF 10 MIN: CPT | Performed by: NURSE PRACTITIONER

## 2021-12-14 NOTE — PATIENT INSTRUCTIONS
Recommend Increasing water intake, stop drinking diet sodas, start eating a heart healthy diabetic diet.

## 2021-12-14 NOTE — PROGRESS NOTES
Subjective     Maite Pacheco is a 86 y.o.. female.     Pt admitting she had donuts for breakfast    Dysuria   This is a new problem. Episode onset: 2 weeks. The problem has been unchanged. There has been no fever. Associated symptoms include frequency and urgency. Pertinent negatives include no flank pain, hematuria, nausea or vomiting. She has tried nothing for the symptoms.       The following portions of the patient's history were reviewed and updated as appropriate: allergies, current medications, past family history, past medical history, past social history, past surgical history and problem list.    Past Medical History:   Diagnosis Date   • Abnormal CT of the abdomen    • Allergic rhinitis    • Arthritis    • Chronic pancreatitis (HCC)    • Diabetes mellitus (HCC)     type 2   • Eustachian tube dysfunction    • History of pneumonia 12/2016   • Hyperglycemia    • Hyperlipidemia    • Inguinal hernia    • Neck pain    • Osteoporosis    • Pancreatic cyst    • Urinary frequency        Past Surgical History:   Procedure Laterality Date   • APPENDECTOMY N/A 1943   • CARDIAC CATHETERIZATION N/A 10/25/2017    Procedure: Coronary angiography;  Surgeon: Gustavo Flores MD;  Location: [facility] INVASIVE LOCATION;  Service:    • CARDIAC CATHETERIZATION N/A 10/25/2017    Procedure: Left heart cath;  Surgeon: Gustavo Flores MD;  Location: [facility] CATH INVASIVE LOCATION;  Service:    • CARDIAC CATHETERIZATION N/A 10/25/2017    Procedure: Left ventriculography;  Surgeon: Gustavo Flores MD;  Location: [facility] INVASIVE LOCATION;  Service:    • COLONOSCOPY N/A 03/31/2003    Colonoscopy to the cecum, terminal ileoscopy, normal-Dr. Zahraa Brito    • INGUINAL HERNIA REPAIR Left 01/18/2008    Open left inguinal hernia repair with mesh-Dr. Zahraa Brito        Review of Systems   Constitutional: Positive for fatigue. Negative for fever.   Respiratory: Negative.    Cardiovascular: Negative.   "  Gastrointestinal: Negative.  Negative for abdominal pain, nausea and vomiting.   Genitourinary: Positive for dysuria, frequency and urgency. Negative for flank pain and hematuria.       Allergies   Allergen Reactions   • Sulfa Antibiotics Rash       Objective     Vitals:    12/14/21 0948 12/14/21 1015   BP: 169/92 122/68   BP Location: Left arm    Patient Position: Sitting    Pulse: 115 98   Resp: 18    Temp: 97.6 °F (36.4 °C)    TempSrc: Oral    SpO2:  92%   Weight: 40.6 kg (89 lb 9.6 oz)    Height: 160 cm (62.99\")      Body mass index is 15.88 kg/m².    Physical Exam  Vitals reviewed.   HENT:      Head: Normocephalic.   Eyes:      Pupils: Pupils are equal, round, and reactive to light.   Cardiovascular:      Rate and Rhythm: Normal rate and regular rhythm.   Pulmonary:      Effort: Pulmonary effort is normal. No accessory muscle usage or respiratory distress.      Breath sounds: Decreased breath sounds (clear/diminished throughout) present. No wheezing, rhonchi or rales.   Musculoskeletal:         General: Normal range of motion.   Neurological:      Mental Status: She is alert and oriented to person, place, and time.   Psychiatric:         Behavior: Behavior normal.           Current Outpatient Medications:   •  atorvastatin (LIPITOR) 10 MG tablet, TAKE 1 TABLET BY MOUTH  DAILY, Disp: 90 tablet, Rfl: 3  •  calcium carbonate (OS-ROSAMARIA) 600 MG tablet, Take 600 mg by mouth Daily., Disp: , Rfl:   •  cholecalciferol (VITAMIN D3) 1000 UNITS tablet, Take 1,000 Units by mouth Daily. Takes takes from October to May, Disp: , Rfl:   •  hydrogen peroxide 3 % external solution, Apply  topically to the appropriate area as directed As Needed for Wound Care., Disp: 236 mL, Rfl: 0  •  metFORMIN ER (GLUCOPHAGE-XR) 500 MG 24 hr tablet, TAKE 1 TABLET BY MOUTH  TWICE DAILY, Disp: 180 tablet, Rfl: 3  •  MULTIPLE VITAMINS PO, Take 1 tablet by mouth Daily., Disp: , Rfl:   •  mupirocin (BACTROBAN) 2 % ointment, Apply  topically to the " appropriate area as directed 2 (Two) Times a Day., Disp: 30 g, Rfl: 0  •  Umeclidinium-Vilanterol (ANORO ELLIPTA) 62.5-25 MCG/INH aerosol powder , Inhale 1 puff Daily., Disp: , Rfl:     Recent Results (from the past 168 hour(s))   POCT urinalysis dipstick, automated    Collection Time: 12/14/21 10:11 AM    Specimen: Urine   Result Value Ref Range    Color Yellow Yellow, Straw, Dark Yellow, Serena    Clarity, UA Slightly Cloudy (A) Clear    Specific Gravity  1.030 1.005 - 1.030    pH, Urine 5.5 5.0 - 8.0    Leukocytes Negative Negative    Nitrite, UA Negative Negative    Protein, POC Trace (A) Negative mg/dL    Glucose, UA >=1000 mg/dL (3+) (A) Negative, 1000 mg/dL (3+) mg/dL    Ketones, UA Negative Negative    Blood, UA Negative Negative    Lot Number N/[id number]     Expiration Date N/A            Assessment/Plan   Diagnoses and all orders for this visit:    1. Dysuria (Primary)  -     POCT urinalysis dipstick, automated    2. Glycosuria        Patient Instructions   Recommend Increasing water intake, stop drinking diet sodas, start eating a heart healthy diabetic diet.      Return if symptoms worsen or fail to improve, for keep appt's in April 2022.

## 2022-03-15 RX ORDER — ATORVASTATIN CALCIUM 10 MG/1
10 TABLET, FILM COATED ORAL DAILY
Qty: 90 TABLET | Refills: 3 | Status: SHIPPED | OUTPATIENT
Start: 2022-03-15 | End: 2023-04-06

## 2022-03-31 DIAGNOSIS — E78.00 PURE HYPERCHOLESTEROLEMIA: Primary | ICD-10-CM

## 2022-03-31 DIAGNOSIS — E11.59 TYPE 2 DIABETES MELLITUS WITH OTHER CIRCULATORY COMPLICATION, WITHOUT LONG-TERM CURRENT USE OF INSULIN: ICD-10-CM

## 2022-04-05 LAB
ALBUMIN SERPL-MCNC: 4.6 G/DL (ref 3.6–4.6)
ALBUMIN/GLOB SERPL: 1.6 {RATIO} (ref 1.2–2.2)
ALP SERPL-CCNC: 76 IU/L (ref 44–121)
ALT SERPL-CCNC: 17 IU/L (ref 0–32)
AST SERPL-CCNC: 27 IU/L (ref 0–40)
BASOPHILS # BLD AUTO: 0 X10E3/UL (ref 0–0.2)
BASOPHILS NFR BLD AUTO: 0 %
BILIRUB SERPL-MCNC: 0.6 MG/DL (ref 0–1.2)
BUN SERPL-MCNC: 22 MG/DL (ref 8–27)
BUN/CREAT SERPL: 36 (ref 12–28)
CALCIUM SERPL-MCNC: 10.4 MG/DL (ref 8.7–10.3)
CHLORIDE SERPL-SCNC: 105 MMOL/L (ref 96–106)
CHOLEST SERPL-MCNC: 142 MG/DL (ref 100–199)
CO2 SERPL-SCNC: 25 MMOL/L (ref 20–29)
CREAT SERPL-MCNC: 0.61 MG/DL (ref 0.57–1)
EGFRCR SERPLBLD CKD-EPI 2021: 88 ML/MIN/1.73
EOSINOPHIL # BLD AUTO: 0.2 X10E3/UL (ref 0–0.4)
EOSINOPHIL NFR BLD AUTO: 3 %
ERYTHROCYTE [DISTWIDTH] IN BLOOD BY AUTOMATED COUNT: 13.1 % (ref 11.7–15.4)
GLOBULIN SER CALC-MCNC: 2.8 G/DL (ref 1.5–4.5)
GLUCOSE SERPL-MCNC: 165 MG/DL (ref 65–99)
HBA1C MFR BLD: 7.7 % (ref 4.8–5.6)
HCT VFR BLD AUTO: 42 % (ref 34–46.6)
HDLC SERPL-MCNC: 86 MG/DL
HGB BLD-MCNC: 14.1 G/DL (ref 11.1–15.9)
IMM GRANULOCYTES # BLD AUTO: 0 X10E3/UL (ref 0–0.1)
IMM GRANULOCYTES NFR BLD AUTO: 0 %
LDLC SERPL CALC-MCNC: 43 MG/DL (ref 0–99)
LDLC/HDLC SERPL: 0.5 RATIO (ref 0–3.2)
LYMPHOCYTES # BLD AUTO: 1 X10E3/UL (ref 0.7–3.1)
LYMPHOCYTES NFR BLD AUTO: 20 %
MCH RBC QN AUTO: 29.4 PG (ref 26.6–33)
MCHC RBC AUTO-ENTMCNC: 33.6 G/DL (ref 31.5–35.7)
MCV RBC AUTO: 88 FL (ref 79–97)
MONOCYTES # BLD AUTO: 0.3 X10E3/UL (ref 0.1–0.9)
MONOCYTES NFR BLD AUTO: 7 %
NEUTROPHILS # BLD AUTO: 3.3 X10E3/UL (ref 1.4–7)
NEUTROPHILS NFR BLD AUTO: 70 %
PLATELET # BLD AUTO: 171 X10E3/UL (ref 150–450)
POTASSIUM SERPL-SCNC: 3.9 MMOL/L (ref 3.5–5.2)
PROT SERPL-MCNC: 7.4 G/DL (ref 6–8.5)
RBC # BLD AUTO: 4.79 X10E6/UL (ref 3.77–5.28)
SODIUM SERPL-SCNC: 144 MMOL/L (ref 134–144)
TRIGL SERPL-MCNC: 61 MG/DL (ref 0–149)
UNABLE TO VOID: NORMAL
VLDLC SERPL CALC-MCNC: 13 MG/DL (ref 5–40)
WBC # BLD AUTO: 4.7 X10E3/UL (ref 3.4–10.8)

## 2022-04-25 ENCOUNTER — HOSPITAL ENCOUNTER (OUTPATIENT)
Dept: CARDIOLOGY | Facility: HOSPITAL | Age: 87
Discharge: HOME | End: 2022-04-25
Admitting: SURGERY

## 2022-04-25 ENCOUNTER — APPOINTMENT (OUTPATIENT)
Dept: CARDIOLOGY | Facility: HOSPITAL | Age: 87
End: 2022-04-25

## 2022-04-25 VITALS
HEIGHT: 61 IN | DIASTOLIC BLOOD PRESSURE: 85 MMHG | SYSTOLIC BLOOD PRESSURE: 160 MMHG | HEART RATE: 85 BPM | WEIGHT: 90 LBS | BODY MASS INDEX: 16.99 KG/M2

## 2022-04-25 DIAGNOSIS — Z13.6 ENCOUNTER FOR SCREENING FOR VASCULAR DISEASE: ICD-10-CM

## 2022-04-25 LAB
BH CV ECHO MEAS - DIST AO DIAM: 1.35 CM
BH CV VAS BP LEFT ARM: NORMAL MMHG
BH CV VAS BP RIGHT ARM: NORMAL MMHG
BH CV XLRA MEAS - MID AO DIAM: 1.65 CM
BH CV XLRA MEAS - PROX AO DIAM: 2.03 CM
BH CV XLRA MEAS LEFT ICA/CCA RATIO: 1.05
BH CV XLRA MEAS LEFT MID CCA PSV: NORMAL CM/SEC
BH CV XLRA MEAS LEFT MID ICA PSV: NORMAL CM/SEC
BH CV XLRA MEAS LEFT PROX ECA PSV: NORMAL CM/SEC
BH CV XLRA MEAS RIGHT ICA/CCA RATIO: 1.2
BH CV XLRA MEAS RIGHT MID CCA PSV: NORMAL CM/SEC
BH CV XLRA MEAS RIGHT MID ICA PSV: NORMAL CM/SEC
BH CV XLRA MEAS RIGHT PROX ECA PSV: NORMAL CM/SEC
MAXIMAL PREDICTED HEART RATE: 135 BPM
STRESS TARGET HR: 115 BPM

## 2022-04-25 PROCEDURE — 93799 UNLISTED CV SVC/PROCEDURE: CPT

## 2022-04-26 ENCOUNTER — OFFICE VISIT (OUTPATIENT)
Dept: FAMILY MEDICINE CLINIC | Facility: CLINIC | Age: 87
End: 2022-04-26

## 2022-04-26 VITALS
SYSTOLIC BLOOD PRESSURE: 142 MMHG | OXYGEN SATURATION: 95 % | DIASTOLIC BLOOD PRESSURE: 80 MMHG | HEART RATE: 85 BPM | BODY MASS INDEX: 16.73 KG/M2 | RESPIRATION RATE: 18 BRPM | WEIGHT: 88.6 LBS | TEMPERATURE: 96.4 F | HEIGHT: 61 IN

## 2022-04-26 DIAGNOSIS — R63.6 UNDERWEIGHT: ICD-10-CM

## 2022-04-26 DIAGNOSIS — E78.00 PURE HYPERCHOLESTEROLEMIA: ICD-10-CM

## 2022-04-26 DIAGNOSIS — E11.59 TYPE 2 DIABETES MELLITUS WITH OTHER CIRCULATORY COMPLICATION, WITHOUT LONG-TERM CURRENT USE OF INSULIN: Primary | ICD-10-CM

## 2022-04-26 PROCEDURE — 1170F FXNL STATUS ASSESSED: CPT | Performed by: INTERNAL MEDICINE

## 2022-04-26 PROCEDURE — 1159F MED LIST DOCD IN RCRD: CPT | Performed by: INTERNAL MEDICINE

## 2022-04-26 PROCEDURE — G0439 PPPS, SUBSEQ VISIT: HCPCS | Performed by: INTERNAL MEDICINE

## 2022-04-26 NOTE — PROGRESS NOTES
The ABCs of the Annual Wellness Visit  Subsequent Medicare Wellness Visit    Chief Complaint   Patient presents with   • Medicare Wellness-subsequent     Wellness      Subjective    History of Present Illness:  Maite Pacheco is a 87 y.o. female who presents for a Subsequent Medicare Wellness Visit.    The following portions of the patient's history were reviewed and   updated as appropriate: allergies, current medications, past family history, past medical history, past social history, past surgical history and problem list.    Compared to one year ago, the patient feels her physical   health is the same.    Compared to one year ago, the patient feels her mental   health is the same.    Recent Hospitalizations:  She was not admitted to the hospital during the last year.       Current Medical Providers:  Patient Care Team:  Rhys Thomas MD as PCP - General (Internal Medicine)    Outpatient Medications Prior to Visit   Medication Sig Dispense Refill   • atorvastatin (LIPITOR) 10 MG tablet TAKE 1 TABLET BY MOUTH  DAILY 90 tablet 3   • calcium carbonate (OS-ROSAMARIA) 600 MG tablet Take 600 mg by mouth Daily.     • cholecalciferol (VITAMIN D3) 1000 UNITS tablet Take 1,000 Units by mouth Daily. Takes takes from October to May     • hydrogen peroxide 3 % external solution Apply  topically to the appropriate area as directed As Needed for Wound Care. 236 mL 0   • metFORMIN ER (GLUCOPHAGE-XR) 500 MG 24 hr tablet TAKE 1 TABLET BY MOUTH  TWICE DAILY 180 tablet 3   • MULTIPLE VITAMINS PO Take 1 tablet by mouth Daily.     • mupirocin (BACTROBAN) 2 % ointment Apply  topically to the appropriate area as directed 2 (Two) Times a Day. 30 g 0   • umeclidinium-vilanterol (ANORO ELLIPTA) 62.5-25 MCG/INH aerosol powder  inhaler Inhale 1 puff Daily.       No facility-administered medications prior to visit.       No opioid medication identified on active medication list. I have reviewed chart for other potential  high risk medication/s  "and harmful drug interactions in the elderly.          Aspirin is not on active medication list.  Aspirin use is not indicated based on review of current medical condition/s. Risk of harm outweighs potential benefits.  .    Patient Active Problem List   Diagnosis   • Type 2 diabetes mellitus (HCC)   • Hyperlipidemia   • Osteoporosis   • Cyst of pancreas   • Hernia of abdominal wall   • Acidosis   • Pulmonary hypertension (HCC)   • PVC (premature ventricular contraction)   • Abnormal stress echo   • Underweight     Advance Care Planning  Advance Directive is on file.  ACP discussion was held with the patient during this visit. Patient has an advance directive in EMR which is still valid.     Review of Systems   Constitutional: Negative.    HENT: Negative.    Respiratory: Positive for shortness of breath.         She has dyspnea on exertion at baseline.  She follow-ups with Dr. Gore.       Cardiovascular: Negative.    Musculoskeletal: Negative.    Neurological: Negative.    Psychiatric/Behavioral: Negative.         Objective    Vitals:    04/26/22 1121   BP: 142/80   Pulse: 85   Resp: 18   Temp: 96.4 °F (35.8 °C)   TempSrc: Temporal   SpO2: 95%   Weight: 40.2 kg (88 lb 9.6 oz)   Height: 154.9 cm (60.98\")     BMI Readings from Last 1 Encounters:   04/26/22 16.75 kg/m²   BMI is below normal parameters. Recommendations include: treating the underlying disease process    Does the patient have evidence of cognitive impairment? No    Physical Exam  Vitals and nursing note reviewed.   Constitutional:       Appearance: Normal appearance.      Comments: Fatigued, in no distress, underweight with a BMI of 16.   Cardiovascular:      Rate and Rhythm: Normal rate and regular rhythm.      Heart sounds: Normal heart sounds. No murmur heard.    No gallop.   Pulmonary:      Effort: No respiratory distress.      Breath sounds: Normal breath sounds. No wheezing or rales.   Neurological:      Mental Status: She is alert and oriented " to person, place, and time.   Psychiatric:         Mood and Affect: Mood normal.         Behavior: Behavior normal.         Thought Content: Thought content normal.       Lab Results   Component Value Date    CHLPL 142 2022    TRIG 61 2022    HDL 86 2022    LDL 43 2022    VLDL 13 2022    HGBA1C 7.7 (H) 2022            HEALTH RISK ASSESSMENT    Smoking Status:  Social History     Tobacco Use   Smoking Status Former Smoker   • Packs/day: 1.00   • Years: 50.00   • Pack years: 50.00   • Types: Cigarettes   • Quit date: 2016   • Years since quittin.3   Smokeless Tobacco Never Used     Alcohol Consumption:  Social History     Substance and Sexual Activity   Alcohol Use No     Fall Risk Screen:    ABEL Fall Risk Assessment was completed, and patient is at LOW risk for falls.Assessment completed on:2022    Depression Screening:  PHQ-2/PHQ-9 Depression Screening 2022   Retired Total Score -   Little Interest or Pleasure in Doing Things 0-->not at all   Feeling Down, Depressed or Hopeless 0-->not at all   PHQ-9: Brief Depression Severity Measure Score 0       Health Habits and Functional and Cognitive Screening:  Functional & Cognitive Status 2022   Do you have difficulty preparing food and eating? No   Do you have difficulty bathing yourself, getting dressed or grooming yourself? No   Do you have difficulty using the toilet? No   Do you have difficulty moving around from place to place? No   Do you have trouble with steps or getting out of a bed or a chair? No   Current Diet Well Balanced Diet   Dental Exam Up to date   Eye Exam Up to date   Exercise (times per week) 0 times per week   Current Exercises Include No Regular Exercise   Do you need help using the phone?  No   Are you deaf or do you have serious difficulty hearing?  Yes   Do you need help with transportation? No   Do you need help shopping? No   Do you need help preparing meals?  No   Do you need help  with housework?  No   Do you need help with laundry? No   Do you need help taking your medications? No   Do you need help managing money? No   Do you ever drive or ride in a car without wearing a seat belt? No   Have you felt unusual stress, anger or loneliness in the last month? No   Who do you live with? Alone   If you need help, do you have trouble finding someone available to you? No   Have you been bothered in the last four weeks by sexual problems? No   Do you have difficulty concentrating, remembering or making decisions? No       Age-appropriate Screening Schedule:  Refer to the list below for future screening recommendations based on patient's age, sex and/or medical conditions. Orders for these recommended tests are listed in the plan section. The patient has been provided with a written plan.    Health Maintenance   Topic Date Due   • DXA SCAN  04/26/2022 (Originally 4/22/2021)   • INFLUENZA VACCINE  08/01/2022   • URINE MICROALBUMIN  09/23/2022   • HEMOGLOBIN A1C  10/04/2022   • DIABETIC EYE EXAM  03/03/2023   • LIPID PANEL  04/04/2023   • MAMMOGRAM  09/21/2023   • TDAP/TD VACCINES (2 - Tdap) 11/27/2027   • ZOSTER VACCINE  Completed              Assessment/Plan   CMS Preventative Services Quick Reference  Risk Factors Identified During Encounter  None Identified  The above risks/problems have been discussed with the patient.  Follow up actions/plans if indicated are seen below in the Assessment/Plan Section.  Pertinent information has been shared with the patient in the After Visit Summary.    Diagnoses and all orders for this visit:    1. Type 2 diabetes mellitus with other circulatory complication, without long-term current use of insulin (HCC) (Primary)    2. Pure hypercholesterolemia    3. Underweight    She has relatively good control of her type 2 diabetes with a hemoglobin A1c of 7.7%.    Her hypercholesterolemia is well controlled on atorvastatin 10 mg daily.    She has chronic obstructive  pulmonary disease and follows up with pulmonary medicine.    She is underweight.  Of asked her to do her best to gain weight.  She says that there is nothing wrong with her appetite, she does not have any sort of dysphagia.    Would like to have her back in about 3 months.  Fasting labs prior to that visit should include globin A1c, and comprehensive metabolic panel.  Vitamin D level    Follow Up:   No follow-ups on file.     An After Visit Summary and PPPS were made available to the patient.

## 2022-07-27 ENCOUNTER — OFFICE VISIT (OUTPATIENT)
Dept: WOUND CARE | Facility: HOSPITAL | Age: 87
End: 2022-07-27

## 2022-07-27 PROCEDURE — 97602 WOUND(S) CARE NON-SELECTIVE: CPT

## 2022-07-27 PROCEDURE — G0463 HOSPITAL OUTPT CLINIC VISIT: HCPCS

## 2022-08-10 ENCOUNTER — OFFICE VISIT (OUTPATIENT)
Dept: WOUND CARE | Facility: HOSPITAL | Age: 87
End: 2022-08-10

## 2022-08-10 PROCEDURE — 97602 WOUND(S) CARE NON-SELECTIVE: CPT

## 2022-08-22 DIAGNOSIS — E55.9 VITAMIN D DEFICIENCY: ICD-10-CM

## 2022-08-22 DIAGNOSIS — E11.59 TYPE 2 DIABETES MELLITUS WITH OTHER CIRCULATORY COMPLICATION, WITHOUT LONG-TERM CURRENT USE OF INSULIN: Primary | ICD-10-CM

## 2022-08-22 RX ORDER — METFORMIN HYDROCHLORIDE 500 MG/1
TABLET, EXTENDED RELEASE ORAL
Qty: 180 TABLET | Refills: 3 | Status: SHIPPED | OUTPATIENT
Start: 2022-08-22

## 2022-08-24 LAB
25(OH)D3+25(OH)D2 SERPL-MCNC: 53.8 NG/ML (ref 30–100)
ALBUMIN SERPL-MCNC: 4.6 G/DL (ref 3.6–4.6)
ALBUMIN/GLOB SERPL: 1.8 {RATIO} (ref 1.2–2.2)
ALP SERPL-CCNC: 74 IU/L (ref 44–121)
ALT SERPL-CCNC: 14 IU/L (ref 0–32)
AST SERPL-CCNC: 24 IU/L (ref 0–40)
BILIRUB SERPL-MCNC: 0.7 MG/DL (ref 0–1.2)
BUN SERPL-MCNC: 25 MG/DL (ref 8–27)
BUN/CREAT SERPL: 35 (ref 12–28)
CALCIUM SERPL-MCNC: 10.5 MG/DL (ref 8.7–10.3)
CHLORIDE SERPL-SCNC: 100 MMOL/L (ref 96–106)
CO2 SERPL-SCNC: 28 MMOL/L (ref 20–29)
CREAT SERPL-MCNC: 0.71 MG/DL (ref 0.57–1)
EGFRCR-CYS SERPLBLD CKD-EPI 2021: 83 ML/MIN/1.73
GLOBULIN SER CALC-MCNC: 2.6 G/DL (ref 1.5–4.5)
GLUCOSE SERPL-MCNC: 185 MG/DL (ref 65–99)
HBA1C MFR BLD: 7.7 % (ref 4.8–5.6)
POTASSIUM SERPL-SCNC: 3.9 MMOL/L (ref 3.5–5.2)
PROT SERPL-MCNC: 7.2 G/DL (ref 6–8.5)
SODIUM SERPL-SCNC: 143 MMOL/L (ref 134–144)

## 2022-08-25 ENCOUNTER — OFFICE VISIT (OUTPATIENT)
Dept: FAMILY MEDICINE CLINIC | Facility: CLINIC | Age: 87
End: 2022-08-25

## 2022-08-25 VITALS
HEIGHT: 61 IN | WEIGHT: 82.6 LBS | TEMPERATURE: 97.8 F | SYSTOLIC BLOOD PRESSURE: 140 MMHG | RESPIRATION RATE: 16 BRPM | OXYGEN SATURATION: 94 % | DIASTOLIC BLOOD PRESSURE: 80 MMHG | BODY MASS INDEX: 15.6 KG/M2 | HEART RATE: 86 BPM

## 2022-08-25 DIAGNOSIS — E11.59 TYPE 2 DIABETES MELLITUS WITH OTHER CIRCULATORY COMPLICATION, WITHOUT LONG-TERM CURRENT USE OF INSULIN: Primary | ICD-10-CM

## 2022-08-25 PROCEDURE — 99214 OFFICE O/P EST MOD 30 MIN: CPT | Performed by: INTERNAL MEDICINE

## 2022-08-25 NOTE — PROGRESS NOTES
Subjective   Maite Pacheco is a 87 y.o. female. Patient is here today for   Chief Complaint   Patient presents with   • Hyperlipidemia     DIABETES- F/U LABS          Vitals:    22 1025   BP: 140/80   Pulse: 86   Resp: 16   Temp: 97.8 °F (36.6 °C)   SpO2: 94%     Body mass index is 15.62 kg/m².      Past Medical History:   Diagnosis Date   • Abnormal CT of the abdomen    • Allergic rhinitis    • Arthritis    • Chronic pancreatitis (HCC)    • COPD (chronic obstructive pulmonary disease) (HCC) 16   • Diabetes mellitus (HCC)     type 2   • Eustachian tube dysfunction    • History of pneumonia 2016   • HL (hearing loss)    • Hyperglycemia    • Hyperlipidemia    • Inguinal hernia    • Neck pain    • Osteopenia    • Osteoporosis    • Pancreatic cyst    • Pneumonia 16   • Urinary frequency       Allergies   Allergen Reactions   • Sulfa Antibiotics Rash      Social History     Socioeconomic History   • Marital status: Single   • Number of children: 0   • Years of education: COLLEGE GRADUATE   Tobacco Use   • Smoking status: Former Smoker     Packs/day: 1.00     Years: 50.00     Pack years: 50.00     Types: Cigarettes     Start date: 1966     Quit date: 2016     Years since quittin.6   • Smokeless tobacco: Never Used   Vaping Use   • Vaping Use: Never used   Substance and Sexual Activity   • Alcohol use: No   • Drug use: No   • Sexual activity: Never        Current Outpatient Medications:   •  atorvastatin (LIPITOR) 10 MG tablet, TAKE 1 TABLET BY MOUTH  DAILY, Disp: 90 tablet, Rfl: 3  •  calcium carbonate (OS-ROSAMARIA) 600 MG tablet, Take 600 mg by mouth Daily., Disp: , Rfl:   •  cholecalciferol (VITAMIN D3) 1000 UNITS tablet, Take 1,000 Units by mouth Daily. Takes takes from October to May, Disp: , Rfl:   •  metFORMIN ER (GLUCOPHAGE-XR) 500 MG 24 hr tablet, TAKE 1 TABLET BY MOUTH  TWICE DAILY, Disp: 180 tablet, Rfl: 3  •  MULTIPLE VITAMINS PO, Take 1 tablet by mouth Daily., Disp: , Rfl:   •   umeclidinium-vilanterol (ANORO ELLIPTA) 62.5-25 MCG/INH aerosol powder  inhaler, Inhale 1 puff Daily., Disp: , Rfl:      Objective     This patient is here to follow-up on labs done last week.    She sees pulmonary medicine.  She is taking an oral Ellipta and feels that works pretty well to improve her breathing.    She is underweight with a BMI of 15.6.    Hyperlipidemia  Pertinent negatives include no chest pain.   Diabetes  She has type 2 diabetes mellitus. MedicAlert identification noted. The initial diagnosis of diabetes was made 6 years ago. Pertinent negatives for hypoglycemia include no confusion, dizziness, headaches, hunger, mood changes, nervousness/anxiousness, pallor, seizures, sleepiness, speech difficulty, sweats or tremors. Pertinent negatives for diabetes include no blurred vision, no chest pain, no fatigue, no foot paresthesias, no foot ulcerations, no polydipsia, no polyphagia, no polyuria, no visual change, no weakness and no weight loss. Pertinent negatives for hypoglycemia complications include no blackouts, no hospitalization, no nocturnal hypoglycemia, no required assistance and no required glucagon injection. Symptoms are stable. Pertinent negatives for diabetic complications include no CVA, heart disease, impotence, nephropathy, peripheral neuropathy, PVD or retinopathy. Risk factors for coronary artery disease include dyslipidemia. Current diabetic treatment includes diet and oral agent (monotherapy). She is compliant with treatment all of the time. She is following a diabetic and generally healthy diet. When asked about meal planning, she reported none. She has not had a previous visit with a dietitian. She never participates in exercise. She sees a podiatrist.Eye exam is current.        Review of Systems   Constitutional: Negative for fatigue and weight loss.   Eyes: Negative for blurred vision.   Cardiovascular: Negative for chest pain.   Endocrine: Negative for polydipsia, polyphagia  and polyuria.   Genitourinary: Negative for impotence.   Skin: Negative for pallor.   Neurological: Negative for dizziness, tremors, seizures, speech difficulty, weakness and headaches.   Psychiatric/Behavioral: Negative for confusion. The patient is not nervous/anxious.        Physical Exam  Vitals and nursing note reviewed.   Constitutional:       Appearance: Normal appearance.      Comments: Pleasant, neatly groomed, in no distress.  She is underweight.  BMI 15.   Cardiovascular:      Rate and Rhythm: Regular rhythm.      Heart sounds: Normal heart sounds. No murmur heard.    No gallop.   Neurological:      Mental Status: She is alert and oriented to person, place, and time.   Psychiatric:         Mood and Affect: Mood normal.         Behavior: Behavior normal.         Thought Content: Thought content normal.           Problems Addressed this Visit        Endocrine and Metabolic    Type 2 diabetes mellitus (HCC) - Primary      Diagnoses       Codes Comments    Type 2 diabetes mellitus with other circulatory complication, without long-term current use of insulin (HCC)    -  Primary ICD-10-CM: E11.59  ICD-9-CM: 250.70             PLAN  She has mediocre control of her type 2 diabetes with a hemoglobin A1c of 7.7%.    She has chronic obstructive pulmonary disease and she feels that she is breathing pretty easily with an oral Ellipta inhaler.    Her hypercholesterolemia is well controlled.    I asked her to follow-up with me in about 3 months.  Fasting labs prior to visit should include: Urine microalbumin, comprehensive metabolic panel, CBC, urinalysis.    She was reluctant to follow-up with me so soon but I prefer that her hemoglobin A1c be lower than it is so she did agree to see me sooner than she would like.    I asked her if I could send her to a dietitian to discuss with her strategies for better management of her type 2 diabetes and to help her gain weight simultaneously.  She did not wish to do that for the  time being.  No follow-ups on file.  Answers for HPI/ROS submitted by the patient on 8/18/2022  What is the primary reason for your visit?: Diabetes

## 2022-08-26 LAB
Lab: NORMAL
T4 FREE SERPL-MCNC: 1.69 NG/DL (ref 0.82–1.77)
TSH SERPL DL<=0.005 MIU/L-ACNC: 0.56 UIU/ML (ref 0.45–4.5)
WRITTEN AUTHORIZATION: NORMAL

## 2022-08-31 ENCOUNTER — OFFICE VISIT (OUTPATIENT)
Dept: WOUND CARE | Facility: HOSPITAL | Age: 87
End: 2022-08-31

## 2022-08-31 PROCEDURE — G0463 HOSPITAL OUTPT CLINIC VISIT: HCPCS

## 2022-09-21 ENCOUNTER — OFFICE VISIT (OUTPATIENT)
Dept: WOUND CARE | Facility: HOSPITAL | Age: 87
End: 2022-09-21

## 2022-09-21 PROCEDURE — G0463 HOSPITAL OUTPT CLINIC VISIT: HCPCS

## 2022-10-05 ENCOUNTER — OFFICE VISIT (OUTPATIENT)
Dept: WOUND CARE | Facility: HOSPITAL | Age: 87
End: 2022-10-05

## 2022-10-05 PROCEDURE — G0463 HOSPITAL OUTPT CLINIC VISIT: HCPCS

## 2022-10-19 ENCOUNTER — OFFICE VISIT (OUTPATIENT)
Dept: WOUND CARE | Facility: HOSPITAL | Age: 87
End: 2022-10-19

## 2022-10-19 PROCEDURE — G0463 HOSPITAL OUTPT CLINIC VISIT: HCPCS

## 2022-11-14 DIAGNOSIS — E78.00 PURE HYPERCHOLESTEROLEMIA: Primary | ICD-10-CM

## 2022-11-14 DIAGNOSIS — E11.59 TYPE 2 DIABETES MELLITUS WITH OTHER CIRCULATORY COMPLICATION, WITHOUT LONG-TERM CURRENT USE OF INSULIN: ICD-10-CM

## 2022-11-16 LAB
ALBUMIN SERPL-MCNC: 4.3 G/DL (ref 3.5–5.2)
ALBUMIN/GLOB SERPL: 1.7 G/DL
ALP SERPL-CCNC: 74 U/L (ref 39–117)
ALT SERPL-CCNC: 16 U/L (ref 1–33)
APPEARANCE UR: CLEAR
AST SERPL-CCNC: 30 U/L (ref 1–32)
BACTERIA #/AREA URNS HPF: NORMAL /HPF
BASOPHILS # BLD AUTO: 0.01 10*3/MM3 (ref 0–0.2)
BASOPHILS NFR BLD AUTO: 0.3 % (ref 0–1.5)
BILIRUB SERPL-MCNC: 0.7 MG/DL (ref 0–1.2)
BILIRUB UR QL STRIP: NEGATIVE
BUN SERPL-MCNC: 20 MG/DL (ref 8–23)
BUN/CREAT SERPL: 33.3 (ref 7–25)
CALCIUM SERPL-MCNC: 9.7 MG/DL (ref 8.6–10.5)
CASTS URNS MICRO: NORMAL
CHLORIDE SERPL-SCNC: 101 MMOL/L (ref 98–107)
CHOLEST SERPL-MCNC: 132 MG/DL (ref 0–200)
CO2 SERPL-SCNC: 29.5 MMOL/L (ref 22–29)
COLOR UR: YELLOW
CREAT SERPL-MCNC: 0.6 MG/DL (ref 0.57–1)
CRYSTALS URNS MICRO: NORMAL
EGFRCR SERPLBLD CKD-EPI 2021: 88.1 ML/MIN/1.73
EOSINOPHIL # BLD AUTO: 0.13 10*3/MM3 (ref 0–0.4)
EOSINOPHIL NFR BLD AUTO: 3.4 % (ref 0.3–6.2)
EPI CELLS #/AREA URNS HPF: NORMAL /HPF
ERYTHROCYTE [DISTWIDTH] IN BLOOD BY AUTOMATED COUNT: 12.2 % (ref 12.3–15.4)
GLOBULIN SER CALC-MCNC: 2.5 GM/DL
GLUCOSE SERPL-MCNC: 169 MG/DL (ref 65–99)
GLUCOSE UR QL STRIP: NEGATIVE
HBA1C MFR BLD: 7.3 % (ref 4.8–5.6)
HCT VFR BLD AUTO: 42.2 % (ref 34–46.6)
HDLC SERPL-MCNC: 76 MG/DL (ref 40–60)
HGB BLD-MCNC: 14 G/DL (ref 12–15.9)
HGB UR QL STRIP: NEGATIVE
IMM GRANULOCYTES # BLD AUTO: 0.01 10*3/MM3 (ref 0–0.05)
IMM GRANULOCYTES NFR BLD AUTO: 0.3 % (ref 0–0.5)
KETONES UR QL STRIP: ABNORMAL
LDLC SERPL CALC-MCNC: 42 MG/DL (ref 0–100)
LDLC/HDLC SERPL: 0.57 {RATIO}
LEUKOCYTE ESTERASE UR QL STRIP: NEGATIVE
LYMPHOCYTES # BLD AUTO: 0.97 10*3/MM3 (ref 0.7–3.1)
LYMPHOCYTES NFR BLD AUTO: 25.7 % (ref 19.6–45.3)
MCH RBC QN AUTO: 30.1 PG (ref 26.6–33)
MCHC RBC AUTO-ENTMCNC: 33.2 G/DL (ref 31.5–35.7)
MCV RBC AUTO: 90.8 FL (ref 79–97)
MICROALBUMIN UR-MCNC: 43.7 UG/ML
MONOCYTES # BLD AUTO: 0.24 10*3/MM3 (ref 0.1–0.9)
MONOCYTES NFR BLD AUTO: 6.3 % (ref 5–12)
NEUTROPHILS # BLD AUTO: 2.42 10*3/MM3 (ref 1.7–7)
NEUTROPHILS NFR BLD AUTO: 64 % (ref 42.7–76)
NITRITE UR QL STRIP: NEGATIVE
NRBC BLD AUTO-RTO: 0 /100 WBC (ref 0–0.2)
PH UR STRIP: 5.5 [PH] (ref 5–8)
PLATELET # BLD AUTO: 167 10*3/MM3 (ref 140–450)
POTASSIUM SERPL-SCNC: 3.9 MMOL/L (ref 3.5–5.2)
PROT SERPL-MCNC: 6.8 G/DL (ref 6–8.5)
PROT UR QL STRIP: ABNORMAL
RBC # BLD AUTO: 4.65 10*6/MM3 (ref 3.77–5.28)
RBC #/AREA URNS HPF: NORMAL /HPF
SODIUM SERPL-SCNC: 139 MMOL/L (ref 136–145)
SP GR UR STRIP: 1.02 (ref 1–1.03)
TRIGL SERPL-MCNC: 65 MG/DL (ref 0–150)
UROBILINOGEN UR STRIP-MCNC: ABNORMAL MG/DL
VLDLC SERPL CALC-MCNC: 14 MG/DL (ref 5–40)
WBC # BLD AUTO: 3.78 10*3/MM3 (ref 3.4–10.8)
WBC #/AREA URNS HPF: NORMAL /HPF

## 2022-11-17 ENCOUNTER — OFFICE VISIT (OUTPATIENT)
Dept: FAMILY MEDICINE CLINIC | Facility: CLINIC | Age: 87
End: 2022-11-17

## 2022-11-17 VITALS
HEIGHT: 61 IN | HEART RATE: 96 BPM | WEIGHT: 86 LBS | BODY MASS INDEX: 16.24 KG/M2 | TEMPERATURE: 97.7 F | DIASTOLIC BLOOD PRESSURE: 85 MMHG | SYSTOLIC BLOOD PRESSURE: 140 MMHG | OXYGEN SATURATION: 95 %

## 2022-11-17 DIAGNOSIS — E78.00 PURE HYPERCHOLESTEROLEMIA: Primary | ICD-10-CM

## 2022-11-17 DIAGNOSIS — E11.59 TYPE 2 DIABETES MELLITUS WITH OTHER CIRCULATORY COMPLICATION, WITHOUT LONG-TERM CURRENT USE OF INSULIN: ICD-10-CM

## 2022-11-17 PROCEDURE — 99213 OFFICE O/P EST LOW 20 MIN: CPT | Performed by: INTERNAL MEDICINE

## 2022-11-17 NOTE — PROGRESS NOTES
Subjective   Maite Pcaheco is a 87 y.o. female. Patient is here today for   Chief Complaint   Patient presents with   • Diabetes     3MFU Type 2          Vitals:    22 0958   BP: 140/85   Pulse: 96   Temp: 97.7 °F (36.5 °C)   SpO2: 95%     Body mass index is 16.26 kg/m².      Past Medical History:   Diagnosis Date   • Abnormal CT of the abdomen    • Allergic rhinitis    • Arthritis    • Chronic pancreatitis (HCC)    • COPD (chronic obstructive pulmonary disease) (HCC) 16   • Diabetes mellitus (HCC)     type 2   • Eustachian tube dysfunction    • History of pneumonia 2016   • HL (hearing loss)    • Hyperglycemia    • Hyperlipidemia    • Inguinal hernia    • Neck pain    • Osteopenia    • Osteoporosis    • Pancreatic cyst    • Pneumonia 16   • Urinary frequency       Allergies   Allergen Reactions   • Sulfa Antibiotics Rash      Social History     Socioeconomic History   • Marital status: Single   • Number of children: 0   • Years of education: COLLEGE GRADUATE   Tobacco Use   • Smoking status: Former     Packs/day: 1.00     Years: 50.00     Pack years: 50.00     Types: Cigarettes     Start date: 1966     Quit date: 2016     Years since quittin.8   • Smokeless tobacco: Never   Vaping Use   • Vaping Use: Never used   Substance and Sexual Activity   • Alcohol use: No   • Drug use: No   • Sexual activity: Never        Current Outpatient Medications:   •  atorvastatin (LIPITOR) 10 MG tablet, TAKE 1 TABLET BY MOUTH  DAILY, Disp: 90 tablet, Rfl: 3  •  calcium carbonate (OS-ROSAMARIA) 600 MG tablet, Take 600 mg by mouth Daily., Disp: , Rfl:   •  cholecalciferol (VITAMIN D3) 1000 UNITS tablet, Take 1,000 Units by mouth Daily. Takes takes from October to May, Disp: , Rfl:   •  metFORMIN ER (GLUCOPHAGE-XR) 500 MG 24 hr tablet, TAKE 1 TABLET BY MOUTH  TWICE DAILY, Disp: 180 tablet, Rfl: 3  •  MULTIPLE VITAMINS PO, Take 1 tablet by mouth Daily., Disp: , Rfl:   •  umeclidinium-vilanterol (ANORO  ELLIPTA) 62.5-25 MCG/INH aerosol powder  inhaler, Inhale 1 puff Daily., Disp: , Rfl:      Objective     History of Present Illness  She is here to follow-up on type 2 diabetes.    She tells me she actually feels pretty well.  She has gained about 4 pounds since I saw her last.  This is a good thing she was too thin.  She still a bit too thin and I asked her to keep up the good work she needs to gain a good 10 pounds before I feel good about her weight.       Review of Systems   Constitutional: Negative.    HENT: Negative.    Respiratory: Negative.    Cardiovascular: Negative.    Musculoskeletal: Negative.    Psychiatric/Behavioral: Negative.        Physical Exam  Vitals and nursing note reviewed.   Constitutional:       Appearance: Normal appearance.      Comments: Pleasant, neatly groomed, no distress.  BMI is 16.  Weight 86 pounds.   Neck:      Vascular: No carotid bruit.   Cardiovascular:      Rate and Rhythm: Normal rate and regular rhythm.      Heart sounds: Normal heart sounds. No murmur heard.    No gallop.   Pulmonary:      Effort: No respiratory distress.      Breath sounds: Normal breath sounds. No wheezing or rales.   Neurological:      Mental Status: She is alert and oriented to person, place, and time.   Psychiatric:         Mood and Affect: Mood normal.         Behavior: Behavior normal.         Thought Content: Thought content normal.           Problems Addressed this Visit        Cardiac and Vasculature    Hyperlipidemia - Primary       Endocrine and Metabolic    Type 2 diabetes mellitus (HCC)   Diagnoses       Codes Comments    Pure hypercholesterolemia    -  Primary ICD-10-CM: E78.00  ICD-9-CM: 272.0     Type 2 diabetes mellitus with other circulatory complication, without long-term current use of insulin (HCC)     ICD-10-CM: E11.59  ICD-9-CM: 250.70             PLAN  She and I reviewed her labs.  She has excellent control of her type 2 diabetes with a hemoglobin A1c of 7.3%.    Her lipid profile  is pretty well controlled with an LDL cholesterol 43.    I asked her to follow-up with me in about 3 months.  Fasting labs prior that visit should include: Lipid profile, comprehensive metabolic panel, CBC, urinalysis, and hemoglobin A1c.  No follow-ups on file.

## 2023-01-01 ENCOUNTER — TELEPHONE (OUTPATIENT)
Dept: FAMILY MEDICINE CLINIC | Facility: CLINIC | Age: 88
End: 2023-01-01

## 2023-02-17 PROCEDURE — 99284 EMERGENCY DEPT VISIT MOD MDM: CPT

## 2023-02-18 ENCOUNTER — HOSPITAL ENCOUNTER (INPATIENT)
Facility: HOSPITAL | Age: 88
LOS: 3 days | Discharge: NURSING, REHAB OR LONG-TERM CARE | DRG: 481 | End: 2023-02-21
Attending: EMERGENCY MEDICINE | Admitting: INTERNAL MEDICINE
Payer: MEDICARE

## 2023-02-18 ENCOUNTER — ANESTHESIA (OUTPATIENT)
Dept: PERIOP | Facility: HOSPITAL | Age: 88
DRG: 481 | End: 2023-02-18
Payer: MEDICARE

## 2023-02-18 ENCOUNTER — APPOINTMENT (OUTPATIENT)
Dept: GENERAL RADIOLOGY | Facility: HOSPITAL | Age: 88
DRG: 481 | End: 2023-02-18
Payer: MEDICARE

## 2023-02-18 ENCOUNTER — ANESTHESIA EVENT (OUTPATIENT)
Dept: PERIOP | Facility: HOSPITAL | Age: 88
DRG: 481 | End: 2023-02-18
Payer: MEDICARE

## 2023-02-18 DIAGNOSIS — S72.142A CLOSED DISPLACED INTERTROCHANTERIC FRACTURE OF LEFT FEMUR, INITIAL ENCOUNTER: Primary | ICD-10-CM

## 2023-02-18 LAB
ALBUMIN SERPL-MCNC: 3.8 G/DL (ref 3.5–5.2)
ALBUMIN/GLOB SERPL: 1.7 G/DL
ALP SERPL-CCNC: 72 U/L (ref 39–117)
ALT SERPL W P-5'-P-CCNC: 14 U/L (ref 1–33)
ANION GAP SERPL CALCULATED.3IONS-SCNC: 7 MMOL/L (ref 5–15)
ANION GAP SERPL CALCULATED.3IONS-SCNC: 8.1 MMOL/L (ref 5–15)
APTT PPP: 29.8 SECONDS (ref 22.7–35.4)
AST SERPL-CCNC: 9 U/L (ref 1–32)
BASOPHILS # BLD AUTO: 0.02 10*3/MM3 (ref 0–0.2)
BASOPHILS # BLD AUTO: 0.02 10*3/MM3 (ref 0–0.2)
BASOPHILS NFR BLD AUTO: 0.2 % (ref 0–1.5)
BASOPHILS NFR BLD AUTO: 0.2 % (ref 0–1.5)
BILIRUB SERPL-MCNC: 0.2 MG/DL (ref 0–1.2)
BUN SERPL-MCNC: 23 MG/DL (ref 8–23)
BUN SERPL-MCNC: 24 MG/DL (ref 8–23)
BUN/CREAT SERPL: 39 (ref 7–25)
BUN/CREAT SERPL: 47.1 (ref 7–25)
CALCIUM SPEC-SCNC: 9.4 MG/DL (ref 8.6–10.5)
CALCIUM SPEC-SCNC: 9.5 MG/DL (ref 8.6–10.5)
CHLORIDE SERPL-SCNC: 104 MMOL/L (ref 98–107)
CHLORIDE SERPL-SCNC: 107 MMOL/L (ref 98–107)
CO2 SERPL-SCNC: 32.9 MMOL/L (ref 22–29)
CO2 SERPL-SCNC: 35 MMOL/L (ref 22–29)
CREAT SERPL-MCNC: 0.51 MG/DL (ref 0.57–1)
CREAT SERPL-MCNC: 0.59 MG/DL (ref 0.57–1)
DEPRECATED RDW RBC AUTO: 39.9 FL (ref 37–54)
DEPRECATED RDW RBC AUTO: 40 FL (ref 37–54)
EGFRCR SERPLBLD CKD-EPI 2021: 88.4 ML/MIN/1.73
EGFRCR SERPLBLD CKD-EPI 2021: 91.6 ML/MIN/1.73
EOSINOPHIL # BLD AUTO: 0.02 10*3/MM3 (ref 0–0.4)
EOSINOPHIL # BLD AUTO: 0.12 10*3/MM3 (ref 0–0.4)
EOSINOPHIL NFR BLD AUTO: 0.2 % (ref 0.3–6.2)
EOSINOPHIL NFR BLD AUTO: 1.2 % (ref 0.3–6.2)
ERYTHROCYTE [DISTWIDTH] IN BLOOD BY AUTOMATED COUNT: 12.3 % (ref 12.3–15.4)
ERYTHROCYTE [DISTWIDTH] IN BLOOD BY AUTOMATED COUNT: 12.4 % (ref 12.3–15.4)
GLOBULIN UR ELPH-MCNC: 2.2 GM/DL
GLUCOSE BLDC GLUCOMTR-MCNC: 140 MG/DL (ref 70–130)
GLUCOSE BLDC GLUCOMTR-MCNC: 160 MG/DL (ref 70–130)
GLUCOSE BLDC GLUCOMTR-MCNC: 169 MG/DL (ref 70–130)
GLUCOSE BLDC GLUCOMTR-MCNC: 182 MG/DL (ref 70–130)
GLUCOSE BLDC GLUCOMTR-MCNC: 212 MG/DL (ref 70–130)
GLUCOSE SERPL-MCNC: 190 MG/DL (ref 65–99)
GLUCOSE SERPL-MCNC: 252 MG/DL (ref 65–99)
HCT VFR BLD AUTO: 32.7 % (ref 34–46.6)
HCT VFR BLD AUTO: 36 % (ref 34–46.6)
HGB BLD-MCNC: 10.6 G/DL (ref 12–15.9)
HGB BLD-MCNC: 11.5 G/DL (ref 12–15.9)
IMM GRANULOCYTES # BLD AUTO: 0.04 10*3/MM3 (ref 0–0.05)
IMM GRANULOCYTES # BLD AUTO: 0.05 10*3/MM3 (ref 0–0.05)
IMM GRANULOCYTES NFR BLD AUTO: 0.4 % (ref 0–0.5)
IMM GRANULOCYTES NFR BLD AUTO: 0.4 % (ref 0–0.5)
INR PPP: 1.11 (ref 0.9–1.1)
INR PPP: 1.15 (ref 0.9–1.1)
LYMPHOCYTES # BLD AUTO: 0.49 10*3/MM3 (ref 0.7–3.1)
LYMPHOCYTES # BLD AUTO: 0.77 10*3/MM3 (ref 0.7–3.1)
LYMPHOCYTES NFR BLD AUTO: 4.3 % (ref 19.6–45.3)
LYMPHOCYTES NFR BLD AUTO: 7.5 % (ref 19.6–45.3)
MCH RBC QN AUTO: 28.6 PG (ref 26.6–33)
MCH RBC QN AUTO: 28.9 PG (ref 26.6–33)
MCHC RBC AUTO-ENTMCNC: 31.9 G/DL (ref 31.5–35.7)
MCHC RBC AUTO-ENTMCNC: 32.4 G/DL (ref 31.5–35.7)
MCV RBC AUTO: 89.1 FL (ref 79–97)
MCV RBC AUTO: 89.6 FL (ref 79–97)
MONOCYTES # BLD AUTO: 0.71 10*3/MM3 (ref 0.1–0.9)
MONOCYTES # BLD AUTO: 0.84 10*3/MM3 (ref 0.1–0.9)
MONOCYTES NFR BLD AUTO: 6.9 % (ref 5–12)
MONOCYTES NFR BLD AUTO: 7.3 % (ref 5–12)
NEUTROPHILS NFR BLD AUTO: 10.08 10*3/MM3 (ref 1.7–7)
NEUTROPHILS NFR BLD AUTO: 8.61 10*3/MM3 (ref 1.7–7)
NEUTROPHILS NFR BLD AUTO: 83.8 % (ref 42.7–76)
NEUTROPHILS NFR BLD AUTO: 87.6 % (ref 42.7–76)
NRBC BLD AUTO-RTO: 0 /100 WBC (ref 0–0.2)
NRBC BLD AUTO-RTO: 0 /100 WBC (ref 0–0.2)
PLATELET # BLD AUTO: 193 10*3/MM3 (ref 140–450)
PLATELET # BLD AUTO: 205 10*3/MM3 (ref 140–450)
PMV BLD AUTO: 9.2 FL (ref 6–12)
PMV BLD AUTO: 9.5 FL (ref 6–12)
POTASSIUM SERPL-SCNC: 3.4 MMOL/L (ref 3.5–5.2)
POTASSIUM SERPL-SCNC: 4.1 MMOL/L (ref 3.5–5.2)
PROT SERPL-MCNC: 6 G/DL (ref 6–8.5)
PROTHROMBIN TIME: 14.4 SECONDS (ref 11.7–14.2)
PROTHROMBIN TIME: 14.9 SECONDS (ref 11.7–14.2)
QT INTERVAL: 376 MS
RBC # BLD AUTO: 3.67 10*6/MM3 (ref 3.77–5.28)
RBC # BLD AUTO: 4.02 10*6/MM3 (ref 3.77–5.28)
SODIUM SERPL-SCNC: 146 MMOL/L (ref 136–145)
SODIUM SERPL-SCNC: 148 MMOL/L (ref 136–145)
WBC NRBC COR # BLD: 10.27 10*3/MM3 (ref 3.4–10.8)
WBC NRBC COR # BLD: 11.5 10*3/MM3 (ref 3.4–10.8)

## 2023-02-18 PROCEDURE — 76000 FLUOROSCOPY <1 HR PHYS/QHP: CPT

## 2023-02-18 PROCEDURE — 85610 PROTHROMBIN TIME: CPT | Performed by: NURSE PRACTITIONER

## 2023-02-18 PROCEDURE — C1713 ANCHOR/SCREW BN/BN,TIS/BN: HCPCS | Performed by: ORTHOPAEDIC SURGERY

## 2023-02-18 PROCEDURE — 25010000002 PROPOFOL 10 MG/ML EMULSION: Performed by: NURSE ANESTHETIST, CERTIFIED REGISTERED

## 2023-02-18 PROCEDURE — 85610 PROTHROMBIN TIME: CPT | Performed by: EMERGENCY MEDICINE

## 2023-02-18 PROCEDURE — 94799 UNLISTED PULMONARY SVC/PX: CPT

## 2023-02-18 PROCEDURE — 85025 COMPLETE CBC W/AUTO DIFF WBC: CPT | Performed by: EMERGENCY MEDICINE

## 2023-02-18 PROCEDURE — 25010000002 CEFAZOLIN IN DEXTROSE 2-4 GM/100ML-% SOLUTION: Performed by: ORTHOPAEDIC SURGERY

## 2023-02-18 PROCEDURE — 80053 COMPREHEN METABOLIC PANEL: CPT | Performed by: EMERGENCY MEDICINE

## 2023-02-18 PROCEDURE — 73502 X-RAY EXAM HIP UNI 2-3 VIEWS: CPT

## 2023-02-18 PROCEDURE — 94640 AIRWAY INHALATION TREATMENT: CPT

## 2023-02-18 PROCEDURE — 73552 X-RAY EXAM OF FEMUR 2/>: CPT

## 2023-02-18 PROCEDURE — 25010000002 FENTANYL CITRATE (PF) 50 MCG/ML SOLUTION: Performed by: NURSE ANESTHETIST, CERTIFIED REGISTERED

## 2023-02-18 PROCEDURE — 63710000001 INSULIN LISPRO (HUMAN) PER 5 UNITS: Performed by: ORTHOPAEDIC SURGERY

## 2023-02-18 PROCEDURE — 36415 COLL VENOUS BLD VENIPUNCTURE: CPT

## 2023-02-18 PROCEDURE — 93010 ELECTROCARDIOGRAM REPORT: CPT | Performed by: INTERNAL MEDICINE

## 2023-02-18 PROCEDURE — 25010000002 ONDANSETRON PER 1 MG: Performed by: EMERGENCY MEDICINE

## 2023-02-18 PROCEDURE — 85730 THROMBOPLASTIN TIME PARTIAL: CPT | Performed by: EMERGENCY MEDICINE

## 2023-02-18 PROCEDURE — 82962 GLUCOSE BLOOD TEST: CPT

## 2023-02-18 PROCEDURE — 94760 N-INVAS EAR/PLS OXIMETRY 1: CPT

## 2023-02-18 PROCEDURE — 25010000002 MORPHINE PER 10 MG: Performed by: EMERGENCY MEDICINE

## 2023-02-18 PROCEDURE — 25010000002 ONDANSETRON PER 1 MG: Performed by: NURSE ANESTHETIST, CERTIFIED REGISTERED

## 2023-02-18 PROCEDURE — 85025 COMPLETE CBC W/AUTO DIFF WBC: CPT | Performed by: NURSE PRACTITIONER

## 2023-02-18 PROCEDURE — 71045 X-RAY EXAM CHEST 1 VIEW: CPT

## 2023-02-18 PROCEDURE — 93005 ELECTROCARDIOGRAM TRACING: CPT | Performed by: NURSE PRACTITIONER

## 2023-02-18 PROCEDURE — 0QS736Z REPOSITION LEFT UPPER FEMUR WITH INTRAMEDULLARY INTERNAL FIXATION DEVICE, PERCUTANEOUS APPROACH: ICD-10-PCS | Performed by: ORTHOPAEDIC SURGERY

## 2023-02-18 PROCEDURE — C1769 GUIDE WIRE: HCPCS | Performed by: ORTHOPAEDIC SURGERY

## 2023-02-18 DEVICE — ZNN CMN NAIL 10MMX21.5CM 125L
Type: IMPLANTABLE DEVICE | Site: FEMUR | Status: FUNCTIONAL
Brand: ZIMMER® NATURAL NAIL® SYSTEM

## 2023-02-18 DEVICE — IMPLANTABLE DEVICE
Type: IMPLANTABLE DEVICE | Site: FEMUR | Status: FUNCTIONAL
Brand: NATURAL NAIL®

## 2023-02-18 DEVICE — ZNN CMN LAG SCREW 10.5X90
Type: IMPLANTABLE DEVICE | Site: FEMUR | Status: FUNCTIONAL
Brand: ZIMMER® NATURAL NAIL® SYSTEM

## 2023-02-18 RX ORDER — DIPHENHYDRAMINE HYDROCHLORIDE 50 MG/ML
12.5 INJECTION INTRAMUSCULAR; INTRAVENOUS
Status: DISCONTINUED | OUTPATIENT
Start: 2023-02-18 | End: 2023-02-18 | Stop reason: HOSPADM

## 2023-02-18 RX ORDER — ONDANSETRON 2 MG/ML
4 INJECTION INTRAMUSCULAR; INTRAVENOUS EVERY 6 HOURS PRN
Status: DISCONTINUED | OUTPATIENT
Start: 2023-02-18 | End: 2023-02-21 | Stop reason: HOSPADM

## 2023-02-18 RX ORDER — SODIUM CHLORIDE 9 MG/ML
40 INJECTION, SOLUTION INTRAVENOUS AS NEEDED
Status: DISCONTINUED | OUTPATIENT
Start: 2023-02-18 | End: 2023-02-21 | Stop reason: HOSPADM

## 2023-02-18 RX ORDER — IPRATROPIUM BROMIDE AND ALBUTEROL SULFATE 2.5; .5 MG/3ML; MG/3ML
3 SOLUTION RESPIRATORY (INHALATION)
Status: DISCONTINUED | OUTPATIENT
Start: 2023-02-18 | End: 2023-02-18

## 2023-02-18 RX ORDER — SODIUM CHLORIDE, SODIUM LACTATE, POTASSIUM CHLORIDE, CALCIUM CHLORIDE 600; 310; 30; 20 MG/100ML; MG/100ML; MG/100ML; MG/100ML
9 INJECTION, SOLUTION INTRAVENOUS CONTINUOUS
Status: DISCONTINUED | OUTPATIENT
Start: 2023-02-18 | End: 2023-02-19

## 2023-02-18 RX ORDER — PROPOFOL 10 MG/ML
VIAL (ML) INTRAVENOUS AS NEEDED
Status: DISCONTINUED | OUTPATIENT
Start: 2023-02-18 | End: 2023-02-18 | Stop reason: SURG

## 2023-02-18 RX ORDER — DEXTROSE MONOHYDRATE 25 G/50ML
25 INJECTION, SOLUTION INTRAVENOUS
Status: DISCONTINUED | OUTPATIENT
Start: 2023-02-18 | End: 2023-02-21 | Stop reason: HOSPADM

## 2023-02-18 RX ORDER — NALOXONE HCL 0.4 MG/ML
0.2 VIAL (ML) INJECTION AS NEEDED
Status: DISCONTINUED | OUTPATIENT
Start: 2023-02-18 | End: 2023-02-18 | Stop reason: HOSPADM

## 2023-02-18 RX ORDER — EPHEDRINE SULFATE 50 MG/ML
5 INJECTION, SOLUTION INTRAVENOUS ONCE AS NEEDED
Status: DISCONTINUED | OUTPATIENT
Start: 2023-02-18 | End: 2023-02-18 | Stop reason: HOSPADM

## 2023-02-18 RX ORDER — HYDROCODONE BITARTRATE AND ACETAMINOPHEN 5; 325 MG/1; MG/1
1 TABLET ORAL ONCE AS NEEDED
Status: DISCONTINUED | OUTPATIENT
Start: 2023-02-18 | End: 2023-02-18 | Stop reason: HOSPADM

## 2023-02-18 RX ORDER — ACETAMINOPHEN 650 MG/1
650 SUPPOSITORY RECTAL EVERY 4 HOURS PRN
Status: DISCONTINUED | OUTPATIENT
Start: 2023-02-18 | End: 2023-02-21 | Stop reason: HOSPADM

## 2023-02-18 RX ORDER — CEFAZOLIN SODIUM 2 G/100ML
2 INJECTION, SOLUTION INTRAVENOUS EVERY 8 HOURS
Status: COMPLETED | OUTPATIENT
Start: 2023-02-18 | End: 2023-02-19

## 2023-02-18 RX ORDER — FENTANYL CITRATE 50 UG/ML
25 INJECTION, SOLUTION INTRAMUSCULAR; INTRAVENOUS
Status: DISCONTINUED | OUTPATIENT
Start: 2023-02-18 | End: 2023-02-18 | Stop reason: HOSPADM

## 2023-02-18 RX ORDER — ATORVASTATIN CALCIUM 20 MG/1
10 TABLET, FILM COATED ORAL DAILY
Status: DISCONTINUED | OUTPATIENT
Start: 2023-02-18 | End: 2023-02-21 | Stop reason: HOSPADM

## 2023-02-18 RX ORDER — ACETAMINOPHEN 160 MG/5ML
650 SOLUTION ORAL EVERY 4 HOURS PRN
Status: DISCONTINUED | OUTPATIENT
Start: 2023-02-18 | End: 2023-02-21 | Stop reason: HOSPADM

## 2023-02-18 RX ORDER — MAGNESIUM HYDROXIDE 1200 MG/15ML
LIQUID ORAL AS NEEDED
Status: DISCONTINUED | OUTPATIENT
Start: 2023-02-18 | End: 2023-02-18 | Stop reason: HOSPADM

## 2023-02-18 RX ORDER — MORPHINE SULFATE 2 MG/ML
2 INJECTION, SOLUTION INTRAMUSCULAR; INTRAVENOUS ONCE
Status: COMPLETED | OUTPATIENT
Start: 2023-02-18 | End: 2023-02-18

## 2023-02-18 RX ORDER — LIDOCAINE HYDROCHLORIDE 10 MG/ML
0.5 INJECTION, SOLUTION EPIDURAL; INFILTRATION; INTRACAUDAL; PERINEURAL ONCE AS NEEDED
Status: DISCONTINUED | OUTPATIENT
Start: 2023-02-18 | End: 2023-02-18 | Stop reason: HOSPADM

## 2023-02-18 RX ORDER — ENOXAPARIN SODIUM 100 MG/ML
30 INJECTION SUBCUTANEOUS EVERY 24 HOURS
Status: DISCONTINUED | OUTPATIENT
Start: 2023-02-19 | End: 2023-02-21 | Stop reason: HOSPADM

## 2023-02-18 RX ORDER — FENTANYL CITRATE 50 UG/ML
INJECTION, SOLUTION INTRAMUSCULAR; INTRAVENOUS AS NEEDED
Status: DISCONTINUED | OUTPATIENT
Start: 2023-02-18 | End: 2023-02-18 | Stop reason: SURG

## 2023-02-18 RX ORDER — SODIUM CHLORIDE 0.9 % (FLUSH) 0.9 %
10 SYRINGE (ML) INJECTION AS NEEDED
Status: DISCONTINUED | OUTPATIENT
Start: 2023-02-18 | End: 2023-02-21 | Stop reason: HOSPADM

## 2023-02-18 RX ORDER — MORPHINE SULFATE 2 MG/ML
2 INJECTION, SOLUTION INTRAMUSCULAR; INTRAVENOUS
Status: DISCONTINUED | OUTPATIENT
Start: 2023-02-18 | End: 2023-02-21 | Stop reason: HOSPADM

## 2023-02-18 RX ORDER — NICOTINE POLACRILEX 4 MG
15 LOZENGE BUCCAL
Status: DISCONTINUED | OUTPATIENT
Start: 2023-02-18 | End: 2023-02-21 | Stop reason: HOSPADM

## 2023-02-18 RX ORDER — FLUMAZENIL 0.1 MG/ML
0.2 INJECTION INTRAVENOUS AS NEEDED
Status: DISCONTINUED | OUTPATIENT
Start: 2023-02-18 | End: 2023-02-18 | Stop reason: HOSPADM

## 2023-02-18 RX ORDER — IPRATROPIUM BROMIDE AND ALBUTEROL SULFATE 2.5; .5 MG/3ML; MG/3ML
3 SOLUTION RESPIRATORY (INHALATION)
Status: DISCONTINUED | OUTPATIENT
Start: 2023-02-18 | End: 2023-02-21 | Stop reason: HOSPADM

## 2023-02-18 RX ORDER — FAMOTIDINE 10 MG/ML
20 INJECTION, SOLUTION INTRAVENOUS ONCE
Status: COMPLETED | OUTPATIENT
Start: 2023-02-18 | End: 2023-02-18

## 2023-02-18 RX ORDER — ONDANSETRON 2 MG/ML
4 INJECTION INTRAMUSCULAR; INTRAVENOUS ONCE AS NEEDED
Status: DISCONTINUED | OUTPATIENT
Start: 2023-02-18 | End: 2023-02-18 | Stop reason: HOSPADM

## 2023-02-18 RX ORDER — SODIUM CHLORIDE 0.9 % (FLUSH) 0.9 %
10 SYRINGE (ML) INJECTION EVERY 12 HOURS SCHEDULED
Status: DISCONTINUED | OUTPATIENT
Start: 2023-02-18 | End: 2023-02-21 | Stop reason: HOSPADM

## 2023-02-18 RX ORDER — INSULIN LISPRO 100 [IU]/ML
0-7 INJECTION, SOLUTION INTRAVENOUS; SUBCUTANEOUS
Status: DISCONTINUED | OUTPATIENT
Start: 2023-02-18 | End: 2023-02-21 | Stop reason: HOSPADM

## 2023-02-18 RX ORDER — ACETAMINOPHEN 325 MG/1
650 TABLET ORAL EVERY 4 HOURS PRN
Status: DISCONTINUED | OUTPATIENT
Start: 2023-02-18 | End: 2023-02-21 | Stop reason: HOSPADM

## 2023-02-18 RX ORDER — MELATONIN
1000 DAILY
Status: DISCONTINUED | OUTPATIENT
Start: 2023-02-18 | End: 2023-02-21 | Stop reason: HOSPADM

## 2023-02-18 RX ORDER — IPRATROPIUM BROMIDE AND ALBUTEROL SULFATE 2.5; .5 MG/3ML; MG/3ML
3 SOLUTION RESPIRATORY (INHALATION) ONCE AS NEEDED
Status: COMPLETED | OUTPATIENT
Start: 2023-02-18 | End: 2023-02-18

## 2023-02-18 RX ORDER — LIDOCAINE HYDROCHLORIDE 20 MG/ML
INJECTION, SOLUTION INFILTRATION; PERINEURAL AS NEEDED
Status: DISCONTINUED | OUTPATIENT
Start: 2023-02-18 | End: 2023-02-18 | Stop reason: SURG

## 2023-02-18 RX ORDER — HYDRALAZINE HYDROCHLORIDE 20 MG/ML
5 INJECTION INTRAMUSCULAR; INTRAVENOUS
Status: DISCONTINUED | OUTPATIENT
Start: 2023-02-18 | End: 2023-02-18 | Stop reason: HOSPADM

## 2023-02-18 RX ORDER — SODIUM CHLORIDE 9 MG/ML
75 INJECTION, SOLUTION INTRAVENOUS CONTINUOUS
Status: DISCONTINUED | OUTPATIENT
Start: 2023-02-18 | End: 2023-02-20

## 2023-02-18 RX ORDER — DIPHENHYDRAMINE HCL 25 MG
25 CAPSULE ORAL
Status: DISCONTINUED | OUTPATIENT
Start: 2023-02-18 | End: 2023-02-18 | Stop reason: HOSPADM

## 2023-02-18 RX ORDER — CALCIUM CARBONATE 500(1250)
500 TABLET ORAL DAILY
Status: DISCONTINUED | OUTPATIENT
Start: 2023-02-18 | End: 2023-02-21 | Stop reason: HOSPADM

## 2023-02-18 RX ORDER — IBUPROFEN 600 MG/1
1 TABLET ORAL
Status: DISCONTINUED | OUTPATIENT
Start: 2023-02-18 | End: 2023-02-21 | Stop reason: HOSPADM

## 2023-02-18 RX ORDER — SODIUM CHLORIDE 0.9 % (FLUSH) 0.9 %
3-10 SYRINGE (ML) INJECTION AS NEEDED
Status: DISCONTINUED | OUTPATIENT
Start: 2023-02-18 | End: 2023-02-18 | Stop reason: HOSPADM

## 2023-02-18 RX ORDER — SODIUM CHLORIDE 0.9 % (FLUSH) 0.9 %
3 SYRINGE (ML) INJECTION EVERY 12 HOURS SCHEDULED
Status: DISCONTINUED | OUTPATIENT
Start: 2023-02-18 | End: 2023-02-18 | Stop reason: HOSPADM

## 2023-02-18 RX ORDER — PHENYLEPHRINE HCL IN 0.9% NACL 1 MG/10 ML
SYRINGE (ML) INTRAVENOUS AS NEEDED
Status: DISCONTINUED | OUTPATIENT
Start: 2023-02-18 | End: 2023-02-18 | Stop reason: SURG

## 2023-02-18 RX ORDER — CEFAZOLIN SODIUM 2 G/100ML
2 INJECTION, SOLUTION INTRAVENOUS
Status: COMPLETED | OUTPATIENT
Start: 2023-02-18 | End: 2023-02-18

## 2023-02-18 RX ORDER — ONDANSETRON 2 MG/ML
INJECTION INTRAMUSCULAR; INTRAVENOUS AS NEEDED
Status: DISCONTINUED | OUTPATIENT
Start: 2023-02-18 | End: 2023-02-18 | Stop reason: SURG

## 2023-02-18 RX ORDER — LABETALOL HYDROCHLORIDE 5 MG/ML
5 INJECTION, SOLUTION INTRAVENOUS
Status: DISCONTINUED | OUTPATIENT
Start: 2023-02-18 | End: 2023-02-18 | Stop reason: HOSPADM

## 2023-02-18 RX ORDER — DIPHENOXYLATE HYDROCHLORIDE AND ATROPINE SULFATE 2.5; .025 MG/1; MG/1
1 TABLET ORAL DAILY
Status: DISCONTINUED | OUTPATIENT
Start: 2023-02-18 | End: 2023-02-21 | Stop reason: HOSPADM

## 2023-02-18 RX ORDER — HYDROCODONE BITARTRATE AND ACETAMINOPHEN 7.5; 325 MG/1; MG/1
1 TABLET ORAL EVERY 4 HOURS PRN
Status: DISCONTINUED | OUTPATIENT
Start: 2023-02-18 | End: 2023-02-21 | Stop reason: HOSPADM

## 2023-02-18 RX ORDER — ONDANSETRON 2 MG/ML
4 INJECTION INTRAMUSCULAR; INTRAVENOUS ONCE
Status: COMPLETED | OUTPATIENT
Start: 2023-02-18 | End: 2023-02-18

## 2023-02-18 RX ORDER — HYDROMORPHONE HYDROCHLORIDE 1 MG/ML
0.25 INJECTION, SOLUTION INTRAMUSCULAR; INTRAVENOUS; SUBCUTANEOUS
Status: DISCONTINUED | OUTPATIENT
Start: 2023-02-18 | End: 2023-02-18 | Stop reason: HOSPADM

## 2023-02-18 RX ADMIN — MORPHINE SULFATE 2 MG: 2 INJECTION, SOLUTION INTRAMUSCULAR; INTRAVENOUS at 02:26

## 2023-02-18 RX ADMIN — IPRATROPIUM BROMIDE AND ALBUTEROL SULFATE 3 ML: 2.5; .5 SOLUTION RESPIRATORY (INHALATION) at 20:45

## 2023-02-18 RX ADMIN — LIDOCAINE HYDROCHLORIDE 60 MG: 20 INJECTION, SOLUTION INFILTRATION; PERINEURAL at 12:56

## 2023-02-18 RX ADMIN — Medication 100 MCG: at 13:07

## 2023-02-18 RX ADMIN — HYDROCODONE BITARTRATE AND ACETAMINOPHEN 1 TABLET: 7.5; 325 TABLET ORAL at 16:59

## 2023-02-18 RX ADMIN — MORPHINE SULFATE 2 MG: 2 INJECTION, SOLUTION INTRAMUSCULAR; INTRAVENOUS at 00:30

## 2023-02-18 RX ADMIN — ONDANSETRON 4 MG: 2 INJECTION INTRAMUSCULAR; INTRAVENOUS at 00:30

## 2023-02-18 RX ADMIN — ONDANSETRON 4 MG: 2 INJECTION INTRAMUSCULAR; INTRAVENOUS at 13:33

## 2023-02-18 RX ADMIN — Medication 100 MCG: at 13:01

## 2023-02-18 RX ADMIN — CEFAZOLIN SODIUM 2 G: 2 INJECTION, SOLUTION INTRAVENOUS at 20:32

## 2023-02-18 RX ADMIN — IPRATROPIUM BROMIDE AND ALBUTEROL SULFATE 3 ML: 2.5; .5 SOLUTION RESPIRATORY (INHALATION) at 08:36

## 2023-02-18 RX ADMIN — FAMOTIDINE 20 MG: 10 INJECTION INTRAVENOUS at 09:55

## 2023-02-18 RX ADMIN — ACETAMINOPHEN ORAL SOLUTION 650 MG: 325 SOLUTION ORAL at 14:11

## 2023-02-18 RX ADMIN — PROPOFOL 70 MG: 10 INJECTION, EMULSION INTRAVENOUS at 12:56

## 2023-02-18 RX ADMIN — FENTANYL CITRATE 25 MCG: 50 INJECTION, SOLUTION INTRAMUSCULAR; INTRAVENOUS at 13:08

## 2023-02-18 RX ADMIN — INSULIN LISPRO 3 UNITS: 100 INJECTION, SOLUTION INTRAVENOUS; SUBCUTANEOUS at 16:58

## 2023-02-18 RX ADMIN — SODIUM CHLORIDE, POTASSIUM CHLORIDE, SODIUM LACTATE AND CALCIUM CHLORIDE 9 ML/HR: 600; 310; 30; 20 INJECTION, SOLUTION INTRAVENOUS at 09:55

## 2023-02-18 RX ADMIN — CEFAZOLIN SODIUM 2 G: 2 INJECTION, SOLUTION INTRAVENOUS at 12:43

## 2023-02-18 RX ADMIN — IPRATROPIUM BROMIDE AND ALBUTEROL SULFATE 3 ML: .5; 3 SOLUTION RESPIRATORY (INHALATION) at 15:00

## 2023-02-18 RX ADMIN — Medication 100 MCG: at 13:11

## 2023-02-18 RX ADMIN — SODIUM CHLORIDE 100 ML/HR: 9 INJECTION, SOLUTION INTRAVENOUS at 03:50

## 2023-02-18 RX ADMIN — Medication 100 MCG: at 13:26

## 2023-02-18 NOTE — ANESTHESIA PREPROCEDURE EVALUATION
Anesthesia Evaluation     no history of anesthetic complications:  NPO Solid Status: > 8 hours  NPO Liquid Status: > 2 hours           Airway   Mallampati: II  TM distance: <3 FB  Anterior  Dental - normal exam     Pulmonary - normal exam   (+) a smoker Former, COPD moderate, shortness of breath,     ROS comment: Hx pulmonary hypertension  50 pack years smoking Hx  Cardiovascular - normal exam    ECG reviewed    (+) CAD, hyperlipidemia,     ROS comment: ? Left ventricular systolic function is normal. Calculated EF = 58.3%. Estimated EF was in agreement with the calculated EF. Estimated EF = 58%. Normal left ventricular cavity size and wall thickness noted. All left ventricular wall segments contract normally. Left ventricular diastolic dysfunction is noted (grade I) consistent with impaired relaxation.  ? The aortic valve is abnormal in structure. There is mild-to-moderate thickening of the aortic valve.  ? Mild mitral valve regurgitation is present.  ? Trace tricuspid valve regurgitation is present. Estimated right ventricular systolic pressure from tricuspid regurgitation is normal (<35 mmHg).  ? Abnormal stress echo with echocardiographic evidence for myocardial ischemia located in the lateral wall.  ? Overall, the patient's exercise capacity was severely impaired.  ? pt c/o SOA. O2 sat 88% during exercise. After rest O2 sat 92%         Neuro/Psych  GI/Hepatic/Renal/Endo    (+)   diabetes mellitus,     ROS Comment: Glucose 169 this AM    Musculoskeletal     Abdominal    Substance History      OB/GYN          Other                      Anesthesia Plan    ASA 4     general     intravenous induction       Plan discussed with CRNA.        CODE STATUS:    Code Status (Patient has no pulse and is not breathing): CPR (Attempt to Resuscitate)  Medical Interventions (Patient has pulse or is breathing): Full Support

## 2023-02-18 NOTE — ANESTHESIA PROCEDURE NOTES
Airway  Urgency: elective    Date/Time: 2/18/2023 12:57 PM  Airway not difficult    General Information and Staff    Patient location during procedure: OR  CRNA/CAA: Johana Silveira CRNA    Indications and Patient Condition  Indications for airway management: airway protection    Preoxygenated: yes  MILS not maintained throughout  Mask difficulty assessment: 1 - vent by mask    Final Airway Details  Final airway type: supraglottic airway      Successful airway: classic and LMA  Size 3     Number of attempts at approach: 1  Assessment: lips, teeth, and gum same as pre-op    Additional Comments  Inflated to seal.

## 2023-02-19 ENCOUNTER — APPOINTMENT (OUTPATIENT)
Dept: GENERAL RADIOLOGY | Facility: HOSPITAL | Age: 88
DRG: 481 | End: 2023-02-19
Payer: MEDICARE

## 2023-02-19 PROBLEM — J44.1 COPD WITH ACUTE EXACERBATION: Status: ACTIVE | Noted: 2023-02-19

## 2023-02-19 LAB
ANION GAP SERPL CALCULATED.3IONS-SCNC: 2 MMOL/L (ref 5–15)
BUN SERPL-MCNC: 18 MG/DL (ref 8–23)
BUN/CREAT SERPL: 33.3 (ref 7–25)
CALCIUM SPEC-SCNC: 7.9 MG/DL (ref 8.6–10.5)
CHLORIDE SERPL-SCNC: 106 MMOL/L (ref 98–107)
CO2 SERPL-SCNC: 35 MMOL/L (ref 22–29)
CREAT SERPL-MCNC: 0.54 MG/DL (ref 0.57–1)
DEPRECATED RDW RBC AUTO: 38.4 FL (ref 37–54)
DEPRECATED RDW RBC AUTO: 39.9 FL (ref 37–54)
EGFRCR SERPLBLD CKD-EPI 2021: 90.4 ML/MIN/1.73
ERYTHROCYTE [DISTWIDTH] IN BLOOD BY AUTOMATED COUNT: 11.7 % (ref 12.3–15.4)
ERYTHROCYTE [DISTWIDTH] IN BLOOD BY AUTOMATED COUNT: 12.2 % (ref 12.3–15.4)
FERRITIN SERPL-MCNC: 116 NG/ML (ref 13–150)
GLUCOSE BLDC GLUCOMTR-MCNC: 109 MG/DL (ref 70–130)
GLUCOSE BLDC GLUCOMTR-MCNC: 175 MG/DL (ref 70–130)
GLUCOSE BLDC GLUCOMTR-MCNC: 220 MG/DL (ref 70–130)
GLUCOSE BLDC GLUCOMTR-MCNC: 309 MG/DL (ref 70–130)
GLUCOSE SERPL-MCNC: 167 MG/DL (ref 65–99)
HCT VFR BLD AUTO: 23.1 % (ref 34–46.6)
HCT VFR BLD AUTO: 23.9 % (ref 34–46.6)
HGB BLD-MCNC: 7.8 G/DL (ref 12–15.9)
HGB BLD-MCNC: 7.9 G/DL (ref 12–15.9)
IRON 24H UR-MRATE: 13 MCG/DL (ref 37–145)
IRON SATN MFR SERPL: 5 % (ref 20–50)
MCH RBC QN AUTO: 29.3 PG (ref 26.6–33)
MCH RBC QN AUTO: 30.5 PG (ref 26.6–33)
MCHC RBC AUTO-ENTMCNC: 32.6 G/DL (ref 31.5–35.7)
MCHC RBC AUTO-ENTMCNC: 34.2 G/DL (ref 31.5–35.7)
MCV RBC AUTO: 89.2 FL (ref 79–97)
MCV RBC AUTO: 89.8 FL (ref 79–97)
PLATELET # BLD AUTO: 104 10*3/MM3 (ref 140–450)
PLATELET # BLD AUTO: 122 10*3/MM3 (ref 140–450)
PMV BLD AUTO: 9.3 FL (ref 6–12)
PMV BLD AUTO: 9.6 FL (ref 6–12)
POTASSIUM SERPL-SCNC: 4 MMOL/L (ref 3.5–5.2)
PROCALCITONIN SERPL-MCNC: 0.08 NG/ML (ref 0–0.25)
RBC # BLD AUTO: 2.59 10*6/MM3 (ref 3.77–5.28)
RBC # BLD AUTO: 2.66 10*6/MM3 (ref 3.77–5.28)
RETICS # AUTO: 0.05 10*6/MM3 (ref 0.02–0.13)
RETICS/RBC NFR AUTO: 1.99 % (ref 0.7–1.9)
SODIUM SERPL-SCNC: 143 MMOL/L (ref 136–145)
TIBC SERPL-MCNC: 259 MCG/DL (ref 298–536)
TRANSFERRIN SERPL-MCNC: 174 MG/DL (ref 200–360)
WBC NRBC COR # BLD: 7.62 10*3/MM3 (ref 3.4–10.8)
WBC NRBC COR # BLD: 8.53 10*3/MM3 (ref 3.4–10.8)

## 2023-02-19 PROCEDURE — 97166 OT EVAL MOD COMPLEX 45 MIN: CPT

## 2023-02-19 PROCEDURE — 94760 N-INVAS EAR/PLS OXIMETRY 1: CPT

## 2023-02-19 PROCEDURE — 63710000001 INSULIN LISPRO (HUMAN) PER 5 UNITS: Performed by: ORTHOPAEDIC SURGERY

## 2023-02-19 PROCEDURE — 94761 N-INVAS EAR/PLS OXIMETRY MLT: CPT

## 2023-02-19 PROCEDURE — 97530 THERAPEUTIC ACTIVITIES: CPT

## 2023-02-19 PROCEDURE — 97110 THERAPEUTIC EXERCISES: CPT

## 2023-02-19 PROCEDURE — 85027 COMPLETE CBC AUTOMATED: CPT | Performed by: ORTHOPAEDIC SURGERY

## 2023-02-19 PROCEDURE — 85045 AUTOMATED RETICULOCYTE COUNT: CPT | Performed by: INTERNAL MEDICINE

## 2023-02-19 PROCEDURE — 80048 BASIC METABOLIC PNL TOTAL CA: CPT | Performed by: ORTHOPAEDIC SURGERY

## 2023-02-19 PROCEDURE — 94664 DEMO&/EVAL PT USE INHALER: CPT

## 2023-02-19 PROCEDURE — 94799 UNLISTED PULMONARY SVC/PX: CPT

## 2023-02-19 PROCEDURE — 97162 PT EVAL MOD COMPLEX 30 MIN: CPT

## 2023-02-19 PROCEDURE — 84145 PROCALCITONIN (PCT): CPT | Performed by: INTERNAL MEDICINE

## 2023-02-19 PROCEDURE — 82962 GLUCOSE BLOOD TEST: CPT

## 2023-02-19 PROCEDURE — 25010000002 ENOXAPARIN PER 10 MG: Performed by: ORTHOPAEDIC SURGERY

## 2023-02-19 PROCEDURE — 25010000002 METHYLPREDNISOLONE PER 125 MG: Performed by: INTERNAL MEDICINE

## 2023-02-19 PROCEDURE — 84466 ASSAY OF TRANSFERRIN: CPT | Performed by: INTERNAL MEDICINE

## 2023-02-19 PROCEDURE — 85027 COMPLETE CBC AUTOMATED: CPT | Performed by: INTERNAL MEDICINE

## 2023-02-19 PROCEDURE — 25010000002 CEFAZOLIN IN DEXTROSE 2-4 GM/100ML-% SOLUTION: Performed by: ORTHOPAEDIC SURGERY

## 2023-02-19 PROCEDURE — 82728 ASSAY OF FERRITIN: CPT | Performed by: INTERNAL MEDICINE

## 2023-02-19 PROCEDURE — 83540 ASSAY OF IRON: CPT | Performed by: INTERNAL MEDICINE

## 2023-02-19 PROCEDURE — 71045 X-RAY EXAM CHEST 1 VIEW: CPT

## 2023-02-19 RX ORDER — METHYLPREDNISOLONE SODIUM SUCCINATE 125 MG/2ML
60 INJECTION, POWDER, LYOPHILIZED, FOR SOLUTION INTRAMUSCULAR; INTRAVENOUS ONCE
Status: COMPLETED | OUTPATIENT
Start: 2023-02-19 | End: 2023-02-19

## 2023-02-19 RX ADMIN — ACETAMINOPHEN 650 MG: 325 TABLET, FILM COATED ORAL at 02:21

## 2023-02-19 RX ADMIN — CEFAZOLIN SODIUM 2 G: 2 INJECTION, SOLUTION INTRAVENOUS at 05:30

## 2023-02-19 RX ADMIN — Medication 1 TABLET: at 09:39

## 2023-02-19 RX ADMIN — Medication 500 MG: at 09:39

## 2023-02-19 RX ADMIN — ATORVASTATIN CALCIUM 10 MG: 20 TABLET, FILM COATED ORAL at 09:39

## 2023-02-19 RX ADMIN — INSULIN LISPRO 5 UNITS: 100 INJECTION, SOLUTION INTRAVENOUS; SUBCUTANEOUS at 11:54

## 2023-02-19 RX ADMIN — ACETAMINOPHEN 650 MG: 325 TABLET, FILM COATED ORAL at 17:51

## 2023-02-19 RX ADMIN — Medication 10 ML: at 19:54

## 2023-02-19 RX ADMIN — SODIUM CHLORIDE 75 ML/HR: 9 INJECTION, SOLUTION INTRAVENOUS at 16:39

## 2023-02-19 RX ADMIN — IPRATROPIUM BROMIDE AND ALBUTEROL SULFATE 3 ML: 2.5; .5 SOLUTION RESPIRATORY (INHALATION) at 12:32

## 2023-02-19 RX ADMIN — Medication 1000 UNITS: at 09:39

## 2023-02-19 RX ADMIN — ENOXAPARIN SODIUM 30 MG: 30 INJECTION SUBCUTANEOUS at 09:39

## 2023-02-19 RX ADMIN — INSULIN LISPRO 2 UNITS: 100 INJECTION, SOLUTION INTRAVENOUS; SUBCUTANEOUS at 07:51

## 2023-02-19 RX ADMIN — IPRATROPIUM BROMIDE AND ALBUTEROL SULFATE 3 ML: 2.5; .5 SOLUTION RESPIRATORY (INHALATION) at 19:27

## 2023-02-19 RX ADMIN — METHYLPREDNISOLONE SODIUM SUCCINATE 60 MG: 125 INJECTION, POWDER, FOR SOLUTION INTRAMUSCULAR; INTRAVENOUS at 14:56

## 2023-02-19 RX ADMIN — IPRATROPIUM BROMIDE AND ALBUTEROL SULFATE 3 ML: 2.5; .5 SOLUTION RESPIRATORY (INHALATION) at 08:56

## 2023-02-19 RX ADMIN — ACETAMINOPHEN 650 MG: 325 TABLET, FILM COATED ORAL at 10:20

## 2023-02-19 RX ADMIN — IPRATROPIUM BROMIDE AND ALBUTEROL SULFATE 3 ML: 2.5; .5 SOLUTION RESPIRATORY (INHALATION) at 16:21

## 2023-02-20 LAB
ANION GAP SERPL CALCULATED.3IONS-SCNC: 4.5 MMOL/L (ref 5–15)
BUN SERPL-MCNC: 19 MG/DL (ref 8–23)
BUN/CREAT SERPL: 46.3 (ref 7–25)
CALCIUM SPEC-SCNC: 8.6 MG/DL (ref 8.6–10.5)
CHLORIDE SERPL-SCNC: 104 MMOL/L (ref 98–107)
CO2 SERPL-SCNC: 33.5 MMOL/L (ref 22–29)
CREAT SERPL-MCNC: 0.41 MG/DL (ref 0.57–1)
DEPRECATED RDW RBC AUTO: 40.6 FL (ref 37–54)
EGFRCR SERPLBLD CKD-EPI 2021: 96.6 ML/MIN/1.73
ERYTHROCYTE [DISTWIDTH] IN BLOOD BY AUTOMATED COUNT: 12.2 % (ref 12.3–15.4)
GLUCOSE BLDC GLUCOMTR-MCNC: 127 MG/DL (ref 70–130)
GLUCOSE BLDC GLUCOMTR-MCNC: 175 MG/DL (ref 70–130)
GLUCOSE BLDC GLUCOMTR-MCNC: 205 MG/DL (ref 70–130)
GLUCOSE BLDC GLUCOMTR-MCNC: 256 MG/DL (ref 70–130)
GLUCOSE SERPL-MCNC: 190 MG/DL (ref 65–99)
HCT VFR BLD AUTO: 23.1 % (ref 34–46.6)
HGB BLD-MCNC: 7.7 G/DL (ref 12–15.9)
MCH RBC QN AUTO: 30.4 PG (ref 26.6–33)
MCHC RBC AUTO-ENTMCNC: 33.3 G/DL (ref 31.5–35.7)
MCV RBC AUTO: 91.3 FL (ref 79–97)
PLATELET # BLD AUTO: 109 10*3/MM3 (ref 140–450)
PMV BLD AUTO: 9.6 FL (ref 6–12)
POTASSIUM SERPL-SCNC: 4 MMOL/L (ref 3.5–5.2)
RBC # BLD AUTO: 2.53 10*6/MM3 (ref 3.77–5.28)
SODIUM SERPL-SCNC: 142 MMOL/L (ref 136–145)
WBC NRBC COR # BLD: 6.55 10*3/MM3 (ref 3.4–10.8)

## 2023-02-20 PROCEDURE — 63710000001 INSULIN LISPRO (HUMAN) PER 5 UNITS: Performed by: ORTHOPAEDIC SURGERY

## 2023-02-20 PROCEDURE — 94664 DEMO&/EVAL PT USE INHALER: CPT

## 2023-02-20 PROCEDURE — 82962 GLUCOSE BLOOD TEST: CPT

## 2023-02-20 PROCEDURE — 80048 BASIC METABOLIC PNL TOTAL CA: CPT | Performed by: INTERNAL MEDICINE

## 2023-02-20 PROCEDURE — 85027 COMPLETE CBC AUTOMATED: CPT | Performed by: INTERNAL MEDICINE

## 2023-02-20 PROCEDURE — 25010000002 ENOXAPARIN PER 10 MG: Performed by: ORTHOPAEDIC SURGERY

## 2023-02-20 PROCEDURE — 94799 UNLISTED PULMONARY SVC/PX: CPT

## 2023-02-20 PROCEDURE — 97530 THERAPEUTIC ACTIVITIES: CPT

## 2023-02-20 RX ORDER — POLYETHYLENE GLYCOL 3350 17 G/17G
17 POWDER, FOR SOLUTION ORAL DAILY
Status: DISCONTINUED | OUTPATIENT
Start: 2023-02-20 | End: 2023-02-21 | Stop reason: HOSPADM

## 2023-02-20 RX ORDER — AMOXICILLIN 250 MG
2 CAPSULE ORAL 2 TIMES DAILY
Status: DISCONTINUED | OUTPATIENT
Start: 2023-02-20 | End: 2023-02-21 | Stop reason: HOSPADM

## 2023-02-20 RX ADMIN — IPRATROPIUM BROMIDE AND ALBUTEROL SULFATE 3 ML: 2.5; .5 SOLUTION RESPIRATORY (INHALATION) at 07:57

## 2023-02-20 RX ADMIN — INSULIN LISPRO 4 UNITS: 100 INJECTION, SOLUTION INTRAVENOUS; SUBCUTANEOUS at 12:12

## 2023-02-20 RX ADMIN — IPRATROPIUM BROMIDE AND ALBUTEROL SULFATE 3 ML: 2.5; .5 SOLUTION RESPIRATORY (INHALATION) at 19:34

## 2023-02-20 RX ADMIN — Medication 1000 UNITS: at 09:51

## 2023-02-20 RX ADMIN — SODIUM CHLORIDE 75 ML/HR: 9 INJECTION, SOLUTION INTRAVENOUS at 05:35

## 2023-02-20 RX ADMIN — IPRATROPIUM BROMIDE AND ALBUTEROL SULFATE 3 ML: 2.5; .5 SOLUTION RESPIRATORY (INHALATION) at 12:19

## 2023-02-20 RX ADMIN — ENOXAPARIN SODIUM 30 MG: 30 INJECTION SUBCUTANEOUS at 09:51

## 2023-02-20 RX ADMIN — Medication 10 ML: at 09:51

## 2023-02-20 RX ADMIN — INSULIN LISPRO 2 UNITS: 100 INJECTION, SOLUTION INTRAVENOUS; SUBCUTANEOUS at 08:06

## 2023-02-20 RX ADMIN — Medication 10 ML: at 20:35

## 2023-02-20 RX ADMIN — ATORVASTATIN CALCIUM 10 MG: 20 TABLET, FILM COATED ORAL at 09:51

## 2023-02-20 RX ADMIN — Medication 500 MG: at 09:51

## 2023-02-20 RX ADMIN — Medication 1 TABLET: at 09:51

## 2023-02-20 RX ADMIN — DOCUSATE SODIUM 50MG AND SENNOSIDES 8.6MG 2 TABLET: 8.6; 5 TABLET, FILM COATED ORAL at 10:24

## 2023-02-21 VITALS
WEIGHT: 86 LBS | TEMPERATURE: 97.7 F | OXYGEN SATURATION: 93 % | BODY MASS INDEX: 16.24 KG/M2 | HEIGHT: 61 IN | DIASTOLIC BLOOD PRESSURE: 69 MMHG | SYSTOLIC BLOOD PRESSURE: 111 MMHG | RESPIRATION RATE: 20 BRPM | HEART RATE: 114 BPM

## 2023-02-21 LAB
ANION GAP SERPL CALCULATED.3IONS-SCNC: 5 MMOL/L (ref 5–15)
BUN SERPL-MCNC: 17 MG/DL (ref 8–23)
BUN/CREAT SERPL: 42.5 (ref 7–25)
CALCIUM SPEC-SCNC: 8.4 MG/DL (ref 8.6–10.5)
CHLORIDE SERPL-SCNC: 102 MMOL/L (ref 98–107)
CO2 SERPL-SCNC: 34 MMOL/L (ref 22–29)
CREAT SERPL-MCNC: 0.4 MG/DL (ref 0.57–1)
DEPRECATED RDW RBC AUTO: 39.3 FL (ref 37–54)
EGFRCR SERPLBLD CKD-EPI 2021: 97.1 ML/MIN/1.73
ERYTHROCYTE [DISTWIDTH] IN BLOOD BY AUTOMATED COUNT: 12.3 % (ref 12.3–15.4)
GLUCOSE BLDC GLUCOMTR-MCNC: 166 MG/DL (ref 70–130)
GLUCOSE BLDC GLUCOMTR-MCNC: 169 MG/DL (ref 70–130)
GLUCOSE SERPL-MCNC: 162 MG/DL (ref 65–99)
HCT VFR BLD AUTO: 25.4 % (ref 34–46.6)
HGB BLD-MCNC: 8.3 G/DL (ref 12–15.9)
MCH RBC QN AUTO: 28.9 PG (ref 26.6–33)
MCHC RBC AUTO-ENTMCNC: 32.7 G/DL (ref 31.5–35.7)
MCV RBC AUTO: 88.5 FL (ref 79–97)
PLATELET # BLD AUTO: 149 10*3/MM3 (ref 140–450)
PMV BLD AUTO: 9.1 FL (ref 6–12)
POTASSIUM SERPL-SCNC: 3.9 MMOL/L (ref 3.5–5.2)
RBC # BLD AUTO: 2.87 10*6/MM3 (ref 3.77–5.28)
SARS-COV-2 RNA RESP QL NAA+PROBE: NOT DETECTED
SODIUM SERPL-SCNC: 141 MMOL/L (ref 136–145)
WBC NRBC COR # BLD: 8.08 10*3/MM3 (ref 3.4–10.8)

## 2023-02-21 PROCEDURE — 87635 SARS-COV-2 COVID-19 AMP PRB: CPT | Performed by: INTERNAL MEDICINE

## 2023-02-21 PROCEDURE — 94799 UNLISTED PULMONARY SVC/PX: CPT

## 2023-02-21 PROCEDURE — 82962 GLUCOSE BLOOD TEST: CPT

## 2023-02-21 PROCEDURE — 80048 BASIC METABOLIC PNL TOTAL CA: CPT | Performed by: INTERNAL MEDICINE

## 2023-02-21 PROCEDURE — 25010000002 ENOXAPARIN PER 10 MG: Performed by: ORTHOPAEDIC SURGERY

## 2023-02-21 PROCEDURE — 94664 DEMO&/EVAL PT USE INHALER: CPT

## 2023-02-21 PROCEDURE — 63710000001 INSULIN LISPRO (HUMAN) PER 5 UNITS: Performed by: ORTHOPAEDIC SURGERY

## 2023-02-21 PROCEDURE — 85027 COMPLETE CBC AUTOMATED: CPT | Performed by: INTERNAL MEDICINE

## 2023-02-21 RX ORDER — ENOXAPARIN SODIUM 100 MG/ML
30 INJECTION SUBCUTANEOUS EVERY 24 HOURS
Qty: 3.6 ML | Refills: 0
Start: 2023-02-21 | End: 2023-03-05

## 2023-02-21 RX ORDER — ACETAMINOPHEN 325 MG/1
650 TABLET ORAL EVERY 4 HOURS PRN
Start: 2023-02-21 | End: 2023-03-22

## 2023-02-21 RX ORDER — BISACODYL 10 MG
10 SUPPOSITORY, RECTAL RECTAL DAILY PRN
Refills: 0
Start: 2023-02-21 | End: 2023-03-22

## 2023-02-21 RX ORDER — HYDROCODONE BITARTRATE AND ACETAMINOPHEN 7.5; 325 MG/1; MG/1
1 TABLET ORAL EVERY 4 HOURS PRN
Qty: 10 TABLET | Refills: 0 | Status: SHIPPED | OUTPATIENT
Start: 2023-02-21 | End: 2023-03-22

## 2023-02-21 RX ORDER — INSULIN LISPRO 100 [IU]/ML
0-7 INJECTION, SOLUTION INTRAVENOUS; SUBCUTANEOUS
Refills: 12
Start: 2023-02-21 | End: 2023-03-22

## 2023-02-21 RX ORDER — AMOXICILLIN 250 MG
2 CAPSULE ORAL 2 TIMES DAILY
Start: 2023-02-21 | End: 2023-03-22

## 2023-02-21 RX ORDER — POLYETHYLENE GLYCOL 3350 17 G/17G
17 POWDER, FOR SOLUTION ORAL DAILY
Start: 2023-02-21

## 2023-02-21 RX ORDER — BISACODYL 10 MG
10 SUPPOSITORY, RECTAL RECTAL ONCE
Status: DISCONTINUED | OUTPATIENT
Start: 2023-02-21 | End: 2023-02-21 | Stop reason: HOSPADM

## 2023-02-21 RX ADMIN — INSULIN LISPRO 2 UNITS: 100 INJECTION, SOLUTION INTRAVENOUS; SUBCUTANEOUS at 11:55

## 2023-02-21 RX ADMIN — ENOXAPARIN SODIUM 30 MG: 30 INJECTION SUBCUTANEOUS at 07:58

## 2023-02-21 RX ADMIN — DOCUSATE SODIUM 50MG AND SENNOSIDES 8.6MG 2 TABLET: 8.6; 5 TABLET, FILM COATED ORAL at 07:58

## 2023-02-21 RX ADMIN — Medication 500 MG: at 07:59

## 2023-02-21 RX ADMIN — INSULIN LISPRO 2 UNITS: 100 INJECTION, SOLUTION INTRAVENOUS; SUBCUTANEOUS at 07:57

## 2023-02-21 RX ADMIN — ATORVASTATIN CALCIUM 10 MG: 20 TABLET, FILM COATED ORAL at 07:58

## 2023-02-21 RX ADMIN — Medication 1000 UNITS: at 07:59

## 2023-02-21 RX ADMIN — Medication 1 TABLET: at 07:59

## 2023-02-21 RX ADMIN — IPRATROPIUM BROMIDE AND ALBUTEROL SULFATE 3 ML: 2.5; .5 SOLUTION RESPIRATORY (INHALATION) at 12:39

## 2023-02-21 RX ADMIN — POLYETHYLENE GLYCOL 3350 17 G: 17 POWDER, FOR SOLUTION ORAL at 08:00

## 2023-02-21 RX ADMIN — Medication 10 ML: at 08:01

## 2023-02-21 RX ADMIN — IPRATROPIUM BROMIDE AND ALBUTEROL SULFATE 3 ML: 2.5; .5 SOLUTION RESPIRATORY (INHALATION) at 07:37

## 2023-03-02 NOTE — PROGRESS NOTES
Enter Query Response Below      Query Response: Due to trauma.  The patient fell.  The osteoporosis does not help the situation.             If applicable, please update the problem list.     Patient: Maite Pacheco        : 1935  Account: [id number]           Admit Date: 2023        How to Respond to this query:       a. Click New Note     b. Answer query within the yellow box.                c. Update the Problem List, if applicable.      If you have any questions about this query contact me at: [email or web address]    Dr. Luna,     86 y/o female with a history of osteoporosis admitted  following a fall. Left hip x-ray dated  notes “Patient does appear to be osteoporotic.” OP note dated  includes left intertrochanteric hip fracture, and a left hip cephalomedullary nailing was performed.     Can the left intertrochanteric hip fracture be further specified as:    - Due to trauma  - Due to osteoporosis  - Other ______________  - Unable to clinically determine      By submitting this query, we are merely seeking further clarification of documentation to accurately reflect all conditions that you are monitoring, evaluating, treating or that extend the hospitalization or utilize additional resources of care. Please utilize your independent clinical judgment when addressing the question(s) above.     This query and your response, once completed, will be entered into the legal medical record.    Sincerely,  Moustapha Patel RN, CDS  Clinical Documentation Integrity Program

## 2023-03-13 ENCOUNTER — READMISSION MANAGEMENT (OUTPATIENT)
Dept: CALL CENTER | Facility: HOSPITAL | Age: 88
End: 2023-03-13
Payer: MEDICARE

## 2023-03-13 ENCOUNTER — HOME HEALTH ADMISSION (OUTPATIENT)
Dept: HOME HEALTH SERVICES | Facility: HOME HEALTHCARE | Age: 88
End: 2023-03-13
Payer: MEDICARE

## 2023-03-13 NOTE — OUTREACH NOTE
Prep Survey    Flowsheet Row Responses   [facility] facility patient discharged from? Non-BH   Is LACE score < 7 ? Non-BH Discharge   Eligibility \[facility]\""    Date of Discharge 03/13/23   Discharge Disposition Home or Self Care   Discharge diagnosis Displaced intertrochanteric fracture of left femur,    Does the patient have one of the following disease processes/diagnoses(primary or secondary)? Other   Prep survey completed? Yes          Rubi KAMARA - Registered Nurse

## 2023-03-14 ENCOUNTER — TRANSITIONAL CARE MANAGEMENT TELEPHONE ENCOUNTER (OUTPATIENT)
Dept: CALL CENTER | Facility: HOSPITAL | Age: 88
End: 2023-03-14
Payer: MEDICARE

## 2023-03-14 NOTE — OUTREACH NOTE
Call Center TCM Note    Flowsheet Row Responses   [facility] patient discharged from? Non-   Does the patient have one of the following disease processes/diagnoses(primary or secondary)? Other   TCM attempt successful? Yes   Call start time 1156   Call end time 1200   Discharge diagnosis Displaced intertrochanteric fracture of left femur,    Is patient permission given to speak with other caregiver? Yes   Meds reviewed with patient/caregiver? Yes   Is the patient having any side effects they believe may be caused by any medication additions or changes? No   Does the patient have all medications ordered at discharge? Yes   Is the patient taking all medications as directed (includes completed medication regime)? Yes   Does the patient have an appointment with their PCP within 7 days of discharge? Yes   Has home health visited the patient within 72 hours of discharge? N/A   Psychosocial issues? No   Did the patient receive a copy of their discharge instructions? Yes   Nursing interventions Reviewed instructions with patient   What is the patient's perception of their health status since discharge? Improving   Is the patient/caregiver able to teach back signs and symptoms related to disease process for when to call PCP? Yes   Is the patient/caregiver able to teach back signs and symptoms related to disease process for when to call 911? Yes   Is the patient/caregiver able to teach back the hierarchy of who to call/visit for symptoms/problems? PCP, Specialist, Home health nurse, Urgent Care, ED, 911 Yes   If the patient is a current smoker, are they able to teach back resources for cessation? Not a smoker   TCM call completed? Yes   Wrap up additional comments D/C DX: Displaced intertrochanteric fracture of left femur - Pt d/c from [facility] 02/21/2023 and from SNF 03/13/2023. Pt doing pretty fair. Still using walker. All needed medications in place. Pt has been seen by Ortho Sryuliya while in SNF. TCM APPT with PCP Dr Thomas  has now been sched for 03/22/2023.    Call end time 1200          Estela Rodriguez MA    3/14/2023, 12:37 EDT

## 2023-03-22 ENCOUNTER — OFFICE VISIT (OUTPATIENT)
Dept: FAMILY MEDICINE CLINIC | Facility: CLINIC | Age: 88
End: 2023-03-22
Payer: MEDICARE

## 2023-03-22 VITALS
WEIGHT: 82 LBS | HEART RATE: 75 BPM | TEMPERATURE: 96.7 F | SYSTOLIC BLOOD PRESSURE: 126 MMHG | BODY MASS INDEX: 15.09 KG/M2 | DIASTOLIC BLOOD PRESSURE: 68 MMHG | HEIGHT: 62 IN

## 2023-03-22 DIAGNOSIS — J44.1 COPD WITH ACUTE EXACERBATION: ICD-10-CM

## 2023-03-22 DIAGNOSIS — R63.6 UNDERWEIGHT: Primary | ICD-10-CM

## 2023-03-22 DIAGNOSIS — S72.142A CLOSED DISPLACED INTERTROCHANTERIC FRACTURE OF LEFT FEMUR, INITIAL ENCOUNTER: ICD-10-CM

## 2023-03-22 DIAGNOSIS — E11.59 TYPE 2 DIABETES MELLITUS WITH OTHER CIRCULATORY COMPLICATION, WITHOUT LONG-TERM CURRENT USE OF INSULIN: ICD-10-CM

## 2023-03-22 RX ORDER — ASCORBIC ACID 500 MG
TABLET ORAL
COMMUNITY

## 2023-03-22 NOTE — PROGRESS NOTES
Subjective   Maite Pacheco is a 87 y.o. female. Patient is here today for   Chief Complaint   Patient presents with   • Hospital Follow Up Visit     Broken hip , rehab -3/12       (Not on file)-  Risk for Readmission (LACE) No data recorded         Vitals:    23 1552   BP: 126/68   Pulse: 75   Temp: 96.7 °F (35.9 °C)     The following portions of the patient's history were reviewed and updated as appropriate: allergies, current medications, past family history, past medical history, past social history, past surgical history and problem list.    Past Medical History:   Diagnosis Date   • Abnormal CT of the abdomen    • Allergic rhinitis    • Arthritis    • Chronic pancreatitis (HCC)    • COPD (chronic obstructive pulmonary disease) (HCC) 16   • Diabetes mellitus (HCC)     type 2   • Eustachian tube dysfunction    • History of pneumonia 2016   • HL (hearing loss)    • Hyperglycemia    • Hyperlipidemia    • Inguinal hernia    • Neck pain    • Osteopenia    • Osteoporosis    • Pancreatic cyst    • Pneumonia 16   • Urinary frequency       Allergies   Allergen Reactions   • Sulfa Antibiotics Rash      Social History     Socioeconomic History   • Marital status: Single   • Number of children: 0   • Years of education: COLLEGE GRADUATE   Tobacco Use   • Smoking status: Former     Packs/day: 1.00     Years: 50.00     Pack years: 50.00     Types: Cigarettes     Start date: 1966     Quit date: 2016     Years since quittin.2   • Smokeless tobacco: Never   Vaping Use   • Vaping Use: Never used   Substance and Sexual Activity   • Alcohol use: No   • Drug use: No   • Sexual activity: Never        Current Outpatient Medications:   •  atorvastatin (LIPITOR) 10 MG tablet, TAKE 1 TABLET BY MOUTH  DAILY, Disp: 90 tablet, Rfl: 3  •  calcium carbonate (OS-ROSAMARIA) 600 MG tablet, Take 2 tablets by mouth Daily., Disp: , Rfl:   •  cholecalciferol (VITAMIN D3) 1000 UNITS tablet, Take 1 tablet by mouth  Daily. Takes takes from October to May, Disp: , Rfl:   •  MULTIPLE VITAMINS PO, Take 1 tablet by mouth Daily., Disp: , Rfl:   •  polyethylene glycol 17 g packet, Take 17 g by mouth Daily., Disp: , Rfl:   •  umeclidinium-vilanterol (ANORO ELLIPTA) 62.5-25 MCG/INH aerosol powder  inhaler, Inhale 1 puff Daily., Disp: , Rfl:   •  Ferrous Sulfate 27 MG tablet, Take  by mouth., Disp: , Rfl:   •  metFORMIN ER (GLUCOPHAGE-XR) 500 MG 24 hr tablet, TAKE 1 TABLET BY MOUTH  TWICE DAILY, Disp: 180 tablet, Rfl: 3  •  vitamin C (ASCORBIC ACID) 500 MG tablet, Take  by mouth., Disp: , Rfl:      Objective     History of Present Illness  This patient was admitted to the hospital with a left hip fracture following mechanical fall.  She had a left hip cephalomedullary nail on 2\18 by Dr. Rivas.    She had a postoperative anemia was not felt to have a need for transfusion.  The physician doing the discharge summary suggested that the rehab physician might start her on supplemental iron.    She was discharged to a rehabilitation facility on February 21.  She was discharged from there about a week ago.  She has been home for the past week.  She has not yet seen physical therapy while in her home but that this coming up sometime soon she assures me.    She is here today for a hospital follow-up.    She is accompanied by a friend.       Review of Systems   Constitutional: Negative.    HENT: Negative.    Respiratory: Negative.    Cardiovascular: Positive for leg swelling.        She denies any dyspnea on exertion while she is on supplemental oxygen 2 L per nasal cannula.    She notes bilateral lower extremity edema from the mid calf distally.  This is a chronic problem.   Gastrointestinal: Negative.    Genitourinary: Negative.    Musculoskeletal:        She is ambulating with a walker.   Neurological: Negative.        Physical Exam  Vitals and nursing note reviewed.   Constitutional:       Appearance: Normal appearance.      Comments:  Pleasant, neatly groomed, cachectic.   Cardiovascular:      Rate and Rhythm: Regular rhythm.      Heart sounds: Normal heart sounds. No murmur heard.    No gallop.   Pulmonary:      Effort: No respiratory distress.      Breath sounds: Normal breath sounds. No wheezing or rales.   Musculoskeletal:      Comments: She has 2+ bilateral lower extremity edema from the distal calf distally.    She has some dermal atrophy bilaterally.  She has some venous stasis changes beginning.   Neurological:      Mental Status: She is alert and oriented to person, place, and time.   Psychiatric:         Mood and Affect: Mood normal.         Behavior: Behavior normal.         Thought Content: Thought content normal.         ASSESSMENT     Problems Addressed this Visit        Endocrine and Metabolic    Type 2 diabetes mellitus (HCC)    Underweight - Primary       Pulmonary and Pneumonias    COPD with acute exacerbation ([facility])       Other    Closed displaced intertrochanteric fracture of left femur, initial encounter ([facility])   Diagnoses       Codes Comments    Underweight    -  Primary ICD-10-CM: R63.6  ICD-9-CM: 783.22     Type 2 diabetes mellitus with other circulatory complication, without long-term current use of insulin ([facility])     ICD-10-CM: E11.59  ICD-9-CM: 250.70     COPD with acute exacerbation ([facility])     ICD-10-CM: J44.1  ICD-9-CM: 491.21     Closed displaced intertrochanteric fracture of left femur, initial encounter ([facility])     ICD-10-CM: S72.142A  ICD-9-CM: 820.21           Current outpatient and discharge medications have been reconciled for the patient.  Reviewed by: Rhys Thomas MD      PLAN  Review of her chart showed a hemoglobin A1c of 7.3% in November 2022.  This shows relatively good control of her type 2 diabetes.    She has bilateral lower extremity edema.  She inquired as to whether or not she needed a diuretic to treat this.  I do not think so.  I would prefer that she wear knee-high compression stockings.    She has  chronic obstructive pulmonary disease.  She feels that she is breathing easily on Anoro Ellipta, supplemental oxygen at 2 L per nasal cannula.  She has appointment to follow-up with her pulmonologist in the next couple weeks.    This patient is too thin.  She and I have had that conversation about her weight in the past.  I would very much like for her to gain weight.  She needs to eat more.    Her friend reasonably asked if she needed to take supplemental iron.  I cannot really tell without checking her iron level.  The last time it was checked was 6 weeks ago while she was in the hospital.  It was low.  Hospitalist indicated that the rehab facility could start her on iron if they felt it was appropriate.    I would like to request a discharge summary from her rehab facility from last week.    She has appointment to follow-up with me in May and I asked her to keep that appointment.    I would like to check the following labs about a week prior to her upcoming appointment to see me: Hemoglobin A1c, comprehensive metabolic panel, serum iron level, total iron-binding capacity, CBC, comprehensive metabolic panel will be reasonable to check vitamin B12 level and check vitamin D level.              There are no Patient Instructions on file for this visit.  No follow-ups on file.

## 2023-04-06 RX ORDER — ATORVASTATIN CALCIUM 10 MG/1
10 TABLET, FILM COATED ORAL DAILY
Qty: 90 TABLET | Refills: 3 | Status: SHIPPED | OUTPATIENT
Start: 2023-04-06

## 2023-04-12 NOTE — TELEPHONE ENCOUNTER
Caller: Maite Pacheco    Relationship to patient: Self    Best call back number: [phone]    Patient is needing: PATIENT IS WANTING TO KNOW IF DR GREEN COULD APPROVE/WRITE AN ORDER FOR AN OXYGEN CONCENTRATOR. PATIENT STATES THAT TO GET ONE HER PROVIDER WOULD HAVE TO WROITE A PRESCRIPTION AND SENT IT TO THE STORE AND THEY MIGHT BE ABLE TO DELIVER IT TO HER AND TEACH HER HOW TO USE IT. PATIENT STATED SHE IS CURRENTLY ON OXYGEN FULL TIME NOW. PATIENT PROVIDED STORE THAT SHE [place] [address]  PHONE [phone]

## 2023-05-22 DIAGNOSIS — E78.00 PURE HYPERCHOLESTEROLEMIA: Primary | ICD-10-CM

## 2023-05-22 DIAGNOSIS — D51.9 ANEMIA DUE TO VITAMIN B12 DEFICIENCY, UNSPECIFIED B12 DEFICIENCY TYPE: ICD-10-CM

## 2023-05-22 DIAGNOSIS — E11.59 TYPE 2 DIABETES MELLITUS WITH OTHER CIRCULATORY COMPLICATION, WITHOUT LONG-TERM CURRENT USE OF INSULIN: ICD-10-CM

## 2023-05-23 LAB
ALBUMIN SERPL-MCNC: 4.5 G/DL (ref 3.5–5.2)
ALBUMIN/GLOB SERPL: 1.7 G/DL
ALP SERPL-CCNC: 95 U/L (ref 39–117)
ALT SERPL-CCNC: 15 U/L (ref 1–33)
AST SERPL-CCNC: 19 U/L (ref 1–32)
BASOPHILS # BLD AUTO: 0.01 10*3/MM3 (ref 0–0.2)
BASOPHILS NFR BLD AUTO: 0.2 % (ref 0–1.5)
BILIRUB SERPL-MCNC: 0.3 MG/DL (ref 0–1.2)
BUN SERPL-MCNC: 22 MG/DL (ref 8–23)
BUN/CREAT SERPL: 32.4 (ref 7–25)
CALCIUM SERPL-MCNC: 11.5 MG/DL (ref 8.6–10.5)
CHLORIDE SERPL-SCNC: 98 MMOL/L (ref 98–107)
CHOLEST SERPL-MCNC: 148 MG/DL (ref 0–200)
CHOLEST/HDLC SERPL: 1.72 {RATIO}
CO2 SERPL-SCNC: 39 MMOL/L (ref 22–29)
CREAT SERPL-MCNC: 0.68 MG/DL (ref 0.57–1)
EGFRCR SERPLBLD CKD-EPI 2021: 84.9 ML/MIN/1.73
EOSINOPHIL # BLD AUTO: 0.07 10*3/MM3 (ref 0–0.4)
EOSINOPHIL NFR BLD AUTO: 1.1 % (ref 0.3–6.2)
ERYTHROCYTE [DISTWIDTH] IN BLOOD BY AUTOMATED COUNT: 12.9 % (ref 12.3–15.4)
GLOBULIN SER CALC-MCNC: 2.6 GM/DL
GLUCOSE SERPL-MCNC: 219 MG/DL (ref 65–99)
HBA1C MFR BLD: 7.9 % (ref 4.8–5.6)
HCT VFR BLD AUTO: 39.6 % (ref 34–46.6)
HDLC SERPL-MCNC: 86 MG/DL (ref 40–60)
HGB BLD-MCNC: 12.9 G/DL (ref 12–15.9)
IMM GRANULOCYTES # BLD AUTO: 0.02 10*3/MM3 (ref 0–0.05)
IMM GRANULOCYTES NFR BLD AUTO: 0.3 % (ref 0–0.5)
IRON SATN MFR SERPL: 17 % (ref 20–50)
IRON SERPL-MCNC: 70 MCG/DL (ref 37–145)
LDLC SERPL CALC-MCNC: 48 MG/DL (ref 0–100)
LYMPHOCYTES # BLD AUTO: 1.15 10*3/MM3 (ref 0.7–3.1)
LYMPHOCYTES NFR BLD AUTO: 18.6 % (ref 19.6–45.3)
MCH RBC QN AUTO: 28.9 PG (ref 26.6–33)
MCHC RBC AUTO-ENTMCNC: 32.6 G/DL (ref 31.5–35.7)
MCV RBC AUTO: 88.8 FL (ref 79–97)
MONOCYTES # BLD AUTO: 0.44 10*3/MM3 (ref 0.1–0.9)
MONOCYTES NFR BLD AUTO: 7.1 % (ref 5–12)
NEUTROPHILS # BLD AUTO: 4.48 10*3/MM3 (ref 1.7–7)
NEUTROPHILS NFR BLD AUTO: 72.7 % (ref 42.7–76)
NRBC BLD AUTO-RTO: 0 /100 WBC (ref 0–0.2)
PLATELET # BLD AUTO: 242 10*3/MM3 (ref 140–450)
POTASSIUM SERPL-SCNC: 4.3 MMOL/L (ref 3.5–5.2)
PROT SERPL-MCNC: 7.1 G/DL (ref 6–8.5)
RBC # BLD AUTO: 4.46 10*6/MM3 (ref 3.77–5.28)
SODIUM SERPL-SCNC: 144 MMOL/L (ref 136–145)
TIBC SERPL-MCNC: 401 MCG/DL
TRIGL SERPL-MCNC: 73 MG/DL (ref 0–150)
UIBC SERPL-MCNC: 331 MCG/DL (ref 112–346)
VIT B12 SERPL-MCNC: 932 PG/ML (ref 211–946)
VLDLC SERPL CALC-MCNC: 14 MG/DL (ref 5–40)
WBC # BLD AUTO: 6.17 10*3/MM3 (ref 3.4–10.8)

## 2023-05-31 ENCOUNTER — OFFICE VISIT (OUTPATIENT)
Dept: FAMILY MEDICINE CLINIC | Facility: CLINIC | Age: 88
End: 2023-05-31

## 2023-05-31 VITALS
OXYGEN SATURATION: 94 % | SYSTOLIC BLOOD PRESSURE: 110 MMHG | HEART RATE: 103 BPM | WEIGHT: 81.1 LBS | HEIGHT: 62 IN | BODY MASS INDEX: 14.93 KG/M2 | DIASTOLIC BLOOD PRESSURE: 66 MMHG

## 2023-05-31 DIAGNOSIS — E11.59 TYPE 2 DIABETES MELLITUS WITH OTHER CIRCULATORY COMPLICATION, WITHOUT LONG-TERM CURRENT USE OF INSULIN: Primary | ICD-10-CM

## 2023-05-31 DIAGNOSIS — J43.9 PULMONARY EMPHYSEMA, UNSPECIFIED EMPHYSEMA TYPE: ICD-10-CM

## 2023-05-31 DIAGNOSIS — D64.89 ANEMIA DUE TO OTHER CAUSE, NOT CLASSIFIED: ICD-10-CM

## 2023-05-31 DIAGNOSIS — R63.6 UNDERWEIGHT: ICD-10-CM

## 2023-05-31 RX ORDER — HYDROXYZINE HYDROCHLORIDE 10 MG/1
10 TABLET, FILM COATED ORAL 3 TIMES DAILY PRN
Qty: 60 TABLET | Refills: 1 | Status: SHIPPED | OUTPATIENT
Start: 2023-05-31

## 2023-06-01 PROBLEM — J44.9 COPD (CHRONIC OBSTRUCTIVE PULMONARY DISEASE): Status: ACTIVE | Noted: 2023-02-19

## 2023-06-01 PROBLEM — D64.89 OTHER SPECIFIED ANEMIAS: Status: ACTIVE | Noted: 2017-02-20

## 2023-06-01 NOTE — PROGRESS NOTES
Subjective   Maite Pacheco is a 88 y.o. female. Patient is here today for   Chief Complaint   Patient presents with   • Results   • Hyperlipidemia   • Diabetes          Vitals:    23 1353   BP: 110/66   Pulse: 103   SpO2: 94%     Body mass index is 14.83 kg/m².      Past Medical History:   Diagnosis Date   • Abnormal CT of the abdomen    • Allergic rhinitis    • Arthritis    • Chronic pancreatitis    • COPD (chronic obstructive pulmonary disease) 16   • Diabetes mellitus     type 2   • Eustachian tube dysfunction    • History of pneumonia 2016   • HL (hearing loss)    • Hyperglycemia    • Hyperlipidemia    • Inguinal hernia    • Neck pain    • Osteopenia    • Osteoporosis    • Pancreatic cyst    • Pneumonia 16   • Urinary frequency       Allergies   Allergen Reactions   • Sulfa Antibiotics Rash      Social History     Socioeconomic History   • Marital status: Single   • Number of children: 0   • Years of education: COLLEGE GRADUATE   Tobacco Use   • Smoking status: Former     Packs/day: 1.00     Years: 50.00     Pack years: 50.00     Types: Cigarettes     Start date: 1966     Quit date: 2016     Years since quittin.4   • Smokeless tobacco: Never   Vaping Use   • Vaping Use: Never used   Substance and Sexual Activity   • Alcohol use: No   • Drug use: No   • Sexual activity: Never        Current Outpatient Medications:   •  atorvastatin (LIPITOR) 10 MG tablet, TAKE 1 TABLET BY MOUTH  DAILY, Disp: 90 tablet, Rfl: 3  •  calcium carbonate (OS-ROSAMARIA) 600 MG tablet, Take 2 tablets by mouth Daily., Disp: , Rfl:   •  cholecalciferol (VITAMIN D3) 1000 UNITS tablet, Take 1 tablet by mouth Daily. Takes takes from October to May, Disp: , Rfl:   •  metFORMIN ER (GLUCOPHAGE-XR) 500 MG 24 hr tablet, TAKE 1 TABLET BY MOUTH  TWICE DAILY, Disp: 180 tablet, Rfl: 3  •  MULTIPLE VITAMINS PO, Take 1 tablet by mouth Daily., Disp: , Rfl:   •  umeclidinium-vilanterol (ANORO ELLIPTA) 62.5-25 MCG/INH aerosol  powder  inhaler, Inhale 1 puff Daily., Disp: , Rfl:   •  vitamin C (ASCORBIC ACID) 500 MG tablet, Take  by mouth., Disp: , Rfl:   •  hydrOXYzine (ATARAX) 10 MG tablet, Take 1 tablet by mouth 3 (Three) Times a Day As Needed for Anxiety., Disp: 60 tablet, Rfl: 1  •  polyethylene glycol 17 g packet, Take 17 g by mouth Daily. (Patient not taking: Reported on 5/31/2023), Disp: , Rfl:      Objective     History of Present Illness  This patient is here to follow-up with me as I requested. She was here on 23rd of March and I have asked her to follow-up in about a month to check up on her weight loss.  She had a BMI of 14 at that time with a weight of approximately 81 pounds.    She has not lost any further weight but she has not gained any weight either.    She is on supplemental oxygen at 2 L per nasal cannula.  She ambulates with a walker.  She is accompanied by a friend.  She had some labs done last week which we we will review today.  Hyperlipidemia    Diabetes         Review of Systems   Constitutional:        She appears to be in no distress on supplemental oxygen at 2 L per nasal cannula.  She is severely underweight with a BMI of 14.   HENT: Negative.    Respiratory: Negative.    Cardiovascular: Negative.    Musculoskeletal: Negative.    Psychiatric/Behavioral: Negative.        Physical Exam  Vitals and nursing note reviewed.   Constitutional:       Appearance: Normal appearance.      Comments: Pleasant.  Chronically ill-appearing.   Cardiovascular:      Rate and Rhythm: Regular rhythm.      Heart sounds: Normal heart sounds. No murmur heard.    No gallop.   Pulmonary:      Effort: No respiratory distress.      Breath sounds: Normal breath sounds. No wheezing or rales.   Neurological:      Mental Status: She is alert and oriented to person, place, and time.   Psychiatric:         Mood and Affect: Mood normal.         Behavior: Behavior normal.         Thought Content: Thought content normal.           Problems  Addressed this Visit        Endocrine and Metabolic    Type 2 diabetes mellitus - Primary    Underweight       Hematology and Neoplasia    Other specified anemias       Pulmonary and Pneumonias    COPD (chronic obstructive pulmonary disease)   Diagnoses       Codes Comments    Type 2 diabetes mellitus with other circulatory complication, without long-term current use of insulin    -  Primary ICD-10-CM: E11.59  ICD-9-CM: 250.70     Underweight     ICD-10-CM: R63.6  ICD-9-CM: 783.22     Pulmonary emphysema, unspecified emphysema type     ICD-10-CM: J43.9  ICD-9-CM: 492.8     Anemia due to other cause, not classified     ICD-10-CM: D64.89  ICD-9-CM: 285.8             PLAN  Her hemoglobin is 12.9.  Her anemia has resolved which was a consequence of her surgery and March.  She remains a little iron deficient.  I asked her to buy over-the-counter iron 65 mg of elemental iron once daily.    She has chronic obstructive pulmonary disease and is stable.  She is going to be discussing her oxygen supplementation with her pulmonologist.  She hopes to get a oxygen concentrator instead of the small oxygen tank that she is caring.    She has type 2 diabetes.  Her hemoglobin A1c is 7.9% reflecting fair control of her type 2 diabetes.    She is severely underweight and I happy to see that she has not lost any further weight.  I asked her to get some Glucerna and drink that 3 times a day and to eat a bit more than she wants to eat at each meal.  She is not having trouble swallowing.  She does not have any nausea.  She does not have any constipation.    She expressed some concern about the potential for worsening of her type 2 diabetes control with increased dietary intake and I think it is more important that she gains weight at this point.    I asked her to follow-up in about 6 weeks.  I will get labs on her at her follow-up appointment on that day.  I will get a hemoglobin A1c that day and recheck her iron level.  No follow-ups on  file.

## 2023-07-12 PROBLEM — N30.90 CYSTITIS: Status: ACTIVE | Noted: 2023-07-12

## 2023-07-12 PROBLEM — F32.1 CURRENT MODERATE EPISODE OF MAJOR DEPRESSIVE DISORDER: Status: ACTIVE | Noted: 2023-07-12

## 2023-07-12 NOTE — TELEPHONE ENCOUNTER
Caller: Maite Pacheco    Relationship: Self    Best call back number:     [phone] (Home)       What is the best time to reach you: ANY     Who are you requesting to speak with (clinical staff, provider,  specific staff member): CLINICAL     What was the call regarding: PATIENT THOUGHT THERE WAS GOING TO BE AN ANTIDEPRESSANT SENT IN TODAY AFTER HER VISIT. NO NEW MEDS LISTED. PLEASE ADVISE.

## 2023-07-13 NOTE — TELEPHONE ENCOUNTER
Called and verified that the pt was able to  the antibiotic and antidepressant. Pt verbally stated that she was able to pick them up.

## 2023-08-02 ENCOUNTER — APPOINTMENT (OUTPATIENT)
Dept: GENERAL RADIOLOGY | Facility: HOSPITAL | Age: 88
DRG: 616 | End: 2023-08-02
Payer: MEDICARE

## 2023-08-02 ENCOUNTER — HOSPITAL ENCOUNTER (INPATIENT)
Facility: HOSPITAL | Age: 88
LOS: 5 days | Discharge: NURSING, REHAB OR LONG-TERM CARE | DRG: 616 | End: 2023-08-08
Attending: EMERGENCY MEDICINE | Admitting: HOSPITALIST
Payer: MEDICARE

## 2023-08-02 DIAGNOSIS — R73.9 HYPERGLYCEMIA: ICD-10-CM

## 2023-08-02 DIAGNOSIS — R00.0 TACHYCARDIA: ICD-10-CM

## 2023-08-02 DIAGNOSIS — M86.9 OSTEOMYELITIS OF SECOND TOE OF LEFT FOOT: ICD-10-CM

## 2023-08-02 DIAGNOSIS — L03.032 CELLULITIS OF TOE OF LEFT FOOT: Primary | ICD-10-CM

## 2023-08-02 DIAGNOSIS — R79.82 CRP ELEVATED: ICD-10-CM

## 2023-08-02 DIAGNOSIS — R70.0 ESR RAISED: ICD-10-CM

## 2023-08-02 PROBLEM — L03.039 CELLULITIS, TOE: Status: ACTIVE | Noted: 2023-08-02

## 2023-08-02 LAB
ALBUMIN SERPL-MCNC: 3.8 G/DL (ref 3.5–5.2)
ALBUMIN/GLOB SERPL: 1.2 G/DL
ALP SERPL-CCNC: 82 U/L (ref 39–117)
ALT SERPL W P-5'-P-CCNC: 17 U/L (ref 1–33)
ANION GAP SERPL CALCULATED.3IONS-SCNC: 4 MMOL/L (ref 5–15)
AST SERPL-CCNC: 18 U/L (ref 1–32)
BASOPHILS # BLD AUTO: 0.01 10*3/MM3 (ref 0–0.2)
BASOPHILS NFR BLD AUTO: 0.1 % (ref 0–1.5)
BILIRUB SERPL-MCNC: 0.2 MG/DL (ref 0–1.2)
BUN SERPL-MCNC: 33 MG/DL (ref 8–23)
BUN/CREAT SERPL: 71.7 (ref 7–25)
CALCIUM SPEC-SCNC: 9.6 MG/DL (ref 8.6–10.5)
CHLORIDE SERPL-SCNC: 99 MMOL/L (ref 98–107)
CO2 SERPL-SCNC: 39 MMOL/L (ref 22–29)
CREAT SERPL-MCNC: 0.46 MG/DL (ref 0.57–1)
CRP SERPL-MCNC: 2.32 MG/DL (ref 0–0.5)
D-LACTATE SERPL-SCNC: 2.3 MMOL/L (ref 0.5–2)
DEPRECATED RDW RBC AUTO: 40.9 FL (ref 37–54)
EGFRCR SERPLBLD CKD-EPI 2021: 93.3 ML/MIN/1.73
EOSINOPHIL # BLD AUTO: 0.08 10*3/MM3 (ref 0–0.4)
EOSINOPHIL NFR BLD AUTO: 1.2 % (ref 0.3–6.2)
ERYTHROCYTE [DISTWIDTH] IN BLOOD BY AUTOMATED COUNT: 12 % (ref 12.3–15.4)
ERYTHROCYTE [SEDIMENTATION RATE] IN BLOOD: 35 MM/HR (ref 0–30)
GLOBULIN UR ELPH-MCNC: 3.1 GM/DL
GLUCOSE SERPL-MCNC: 330 MG/DL (ref 65–99)
HCT VFR BLD AUTO: 35 % (ref 34–46.6)
HGB BLD-MCNC: 11.3 G/DL (ref 12–15.9)
HOLD SPECIMEN: NORMAL
HOLD SPECIMEN: NORMAL
IMM GRANULOCYTES # BLD AUTO: 0.04 10*3/MM3 (ref 0–0.05)
IMM GRANULOCYTES NFR BLD AUTO: 0.6 % (ref 0–0.5)
LYMPHOCYTES # BLD AUTO: 0.41 10*3/MM3 (ref 0.7–3.1)
LYMPHOCYTES NFR BLD AUTO: 6 % (ref 19.6–45.3)
MCH RBC QN AUTO: 30 PG (ref 26.6–33)
MCHC RBC AUTO-ENTMCNC: 32.3 G/DL (ref 31.5–35.7)
MCV RBC AUTO: 92.8 FL (ref 79–97)
MONOCYTES # BLD AUTO: 0.47 10*3/MM3 (ref 0.1–0.9)
MONOCYTES NFR BLD AUTO: 6.9 % (ref 5–12)
NEUTROPHILS NFR BLD AUTO: 5.78 10*3/MM3 (ref 1.7–7)
NEUTROPHILS NFR BLD AUTO: 85.2 % (ref 42.7–76)
NRBC BLD AUTO-RTO: 0 /100 WBC (ref 0–0.2)
PLATELET # BLD AUTO: 229 10*3/MM3 (ref 140–450)
PMV BLD AUTO: 9.4 FL (ref 6–12)
POTASSIUM SERPL-SCNC: 4.3 MMOL/L (ref 3.5–5.2)
PROCALCITONIN SERPL-MCNC: 0.07 NG/ML (ref 0–0.25)
PROT SERPL-MCNC: 6.9 G/DL (ref 6–8.5)
RBC # BLD AUTO: 3.77 10*6/MM3 (ref 3.77–5.28)
SODIUM SERPL-SCNC: 142 MMOL/L (ref 136–145)
WBC NRBC COR # BLD: 6.79 10*3/MM3 (ref 3.4–10.8)
WHOLE BLOOD HOLD COAG: NORMAL
WHOLE BLOOD HOLD SPECIMEN: NORMAL

## 2023-08-02 PROCEDURE — 83605 ASSAY OF LACTIC ACID: CPT | Performed by: EMERGENCY MEDICINE

## 2023-08-02 PROCEDURE — 99285 EMERGENCY DEPT VISIT HI MDM: CPT

## 2023-08-02 PROCEDURE — 85652 RBC SED RATE AUTOMATED: CPT | Performed by: EMERGENCY MEDICINE

## 2023-08-02 PROCEDURE — 86140 C-REACTIVE PROTEIN: CPT | Performed by: EMERGENCY MEDICINE

## 2023-08-02 PROCEDURE — 85025 COMPLETE CBC W/AUTO DIFF WBC: CPT

## 2023-08-02 PROCEDURE — 80053 COMPREHEN METABOLIC PANEL: CPT

## 2023-08-02 PROCEDURE — 73630 X-RAY EXAM OF FOOT: CPT

## 2023-08-02 PROCEDURE — 87040 BLOOD CULTURE FOR BACTERIA: CPT | Performed by: EMERGENCY MEDICINE

## 2023-08-02 PROCEDURE — 84145 PROCALCITONIN (PCT): CPT | Performed by: EMERGENCY MEDICINE

## 2023-08-02 RX ADMIN — DOXYCYCLINE 100 MG: 100 INJECTION, POWDER, LYOPHILIZED, FOR SOLUTION INTRAVENOUS at 23:31

## 2023-08-02 NOTE — ED PROVIDER NOTES
EMERGENCY DEPARTMENT ENCOUNTER  I wore full protective equipment throughout this patient encounter including a N95 mask, eye shield, gown and gloves. Hand hygiene was performed before donning protective equipment and after removal when leaving the room.    Room Number:  22/22  Date of encounter:  8/2/2023  PCP: Rhys Thomas MD  Patient Care Team:  Rhys Thomas MD as PCP - General (Internal Medicine)     HPI:  Context: Maite Pacheco is a 88 y.o. female who presents to the ED c/o chief complaint of wound on the second toe of her left foot.  Patient reports that she had her foot wrapped up after having a procedure on his in May, developed a small ulceration at that time.  Patient reports ulceration has continued, reports that for the last 2 weeks it has had some slight surrounding redness and warmth, denies any drainage.  Patient reports that she was seen by podiatrist yesterday, reports had x-ray that was concerning for exposed bone.  Patient denies any recent change in the wound, denies any fevers shakes chills or night sweats.  Patient reports that she was started on antibiotic yesterday, is unsure what it is, reports that prior to that she had been on antibiotic for urinary tract infection approximately 3 weeks ago.  Patient denies any history of cellulitis abscess or MRSA in the past.  Patient is a diabetic, is not on insulin, only on metformin.    MEDICAL HISTORY REVIEW  Reviewed in EPIC    PAST MEDICAL HISTORY  Active Ambulatory Problems     Diagnosis Date Noted    Type 2 diabetes mellitus 04/05/2016    Hyperlipidemia 04/05/2016    Osteoporosis 04/05/2016    Cyst of pancreas 04/05/2016    Hernia of abdominal wall 06/16/2016    Acidosis 12/30/2016    Pulmonary hypertension 02/20/2017    Other specified anemias 02/20/2017    PVC (premature ventricular contraction) 03/04/2017    Abnormal stress echo 10/12/2017    Underweight 04/26/2022    Closed displaced intertrochanteric fracture of left femur, initial  encounter 02/18/2023    COPD (chronic obstructive pulmonary disease) 02/19/2023    Current moderate episode of major depressive disorder 07/12/2023    Cystitis 07/12/2023     Resolved Ambulatory Problems     Diagnosis Date Noted    Abnormal computed tomography scan 04/05/2016    Chronic pancreatitis 04/05/2016    Dysfunction of eustachian tube 04/05/2016    Right inguinal hernia 08/22/2016    Tobacco use 08/22/2016    Pneumonia involving left lung 12/30/2016    Sepsis 12/30/2016    Tachycardia 12/30/2016    Acute respiratory failure with hypoxia 12/30/2016    Elevated liver function tests 12/21/2017    Chronic bronchitis 03/26/2018    Chest wall pain 04/11/2018     Past Medical History:   Diagnosis Date    Abnormal CT of the abdomen     Allergic rhinitis     Arthritis     Diabetes mellitus     Eustachian tube dysfunction     History of pneumonia 12/2016    HL (hearing loss)     Hyperglycemia     Inguinal hernia     Neck pain     Osteopenia     Pancreatic cyst     Pneumonia 12/30/16    Urinary frequency        PAST SURGICAL HISTORY  Past Surgical History:   Procedure Laterality Date    APPENDECTOMY N/A 1943    CARDIAC CATHETERIZATION N/A 10/25/2017    Procedure: Coronary angiography;  Surgeon: Gustavo Flores MD;  Location: [facility] CATH INVASIVE LOCATION;  Service:     CARDIAC CATHETERIZATION N/A 10/25/2017    Procedure: Left heart cath;  Surgeon: Gustavo Flores MD;  Location: [facility] CATH INVASIVE LOCATION;  Service:     CARDIAC CATHETERIZATION N/A 10/25/2017    Procedure: Left ventriculography;  Surgeon: Gustavo Flores MD;  Location: [facility] CATH INVASIVE LOCATION;  Service:     COLONOSCOPY N/A 03/31/2003    Colonoscopy to the cecum, terminal ileoscopy, normal-Dr. Zahraa Brito     FEMUR IM NAILING/RODDING Left 2/18/2023    Procedure: LEFT FEMUR INTRAMEDULLARY NAILING/RODDING;  Surgeon: Best Luna MD;  Location: [facility] MAIN OR;  Service: Orthopedics;  Laterality: Left;    INGUINAL HERNIA  REPAIR Left 2008    Open left inguinal hernia repair with mesh-Dr. Zahraa Brito        FAMILY HISTORY  Family History   Problem Relation Age of Onset    Stroke Mother     Heart disease Mother     Arthritis Mother     Breast cancer Neg Hx     Malig Hyperthermia Neg Hx        SOCIAL HISTORY  Social History     Socioeconomic History    Marital status: Single    Number of children: 0    Years of education: COLLEGE GRADUATE   Tobacco Use    Smoking status: Former     Packs/day: 1.00     Years: 50.00     Pack years: 50.00     Types: Cigarettes     Start date: 1966     Quit date: 2016     Years since quittin.5    Smokeless tobacco: Never   Vaping Use    Vaping Use: Never used   Substance and Sexual Activity    Alcohol use: No    Drug use: No    Sexual activity: Never       ALLERGIES  Sulfa antibiotics    The patient's allergies have been reviewed    REVIEW OF SYSTEMS  All systems reviewed and negative except for those discussed in HPI.     PHYSICAL EXAM  I have reviewed the triage vital signs and nursing notes.  ED Triage Vitals   Temp Heart Rate Resp BP SpO2   23 1709 23 1703 23 1704 23 1713 23 1713   97.4 øF (36.3 øC) 109 16 123/79 98 %      Temp src Heart Rate Source Patient Position BP Location FiO2 (%)   -- -- -- -- --              General: No acute distress.  HENT: NCAT, PERRL, Nares patent.  Eyes: no scleral icterus.  Neck: trachea midline, no ROM limitations.  CV: regular rhythm, regular rate.  Respiratory: normal effort, CTAB.  Abdomen: soft, nondistended, NTTP, no rebound tenderness, no guarding or rigidity.  Musculoskeletal: no deformity.  Neuro: alert, moves all extremities, follows commands.  Skin: warm, dry.  Ulceration on the dorsum of the left second toe with surrounding erythema, no warmth, no drainage.  Erythema limited to the toe, no erythema or swelling of the foot.  Foot is otherwise normal.    LAB RESULTS  Recent Results (from the past 24 hour(s))    Comprehensive Metabolic Panel    Collection Time: 08/02/23  5:21 PM    Specimen: Blood   Result Value Ref Range    Glucose 330 (H) 65 - 99 mg/dL    BUN 33 (H) 8 - 23 mg/dL    Creatinine 0.46 (L) 0.57 - 1.00 mg/dL    Sodium 142 136 - 145 mmol/L    Potassium 4.3 3.5 - 5.2 mmol/L    Chloride 99 98 - 107 mmol/L    CO2 39.0 (H) 22.0 - 29.0 mmol/L    Calcium 9.6 8.6 - 10.5 mg/dL    Total Protein 6.9 6.0 - 8.5 g/dL    Albumin 3.8 3.5 - 5.2 g/dL    ALT (SGPT) 17 1 - 33 U/L    AST (SGOT) 18 1 - 32 U/L    Alkaline Phosphatase 82 39 - 117 U/L    Total Bilirubin 0.2 0.0 - 1.2 mg/dL    Globulin 3.1 gm/dL    A/G Ratio 1.2 g/dL    BUN/Creatinine Ratio 71.7 (H) 7.0 - 25.0    Anion Gap 4.0 (L) 5.0 - 15.0 mmol/L    eGFR 93.3 >60.0 mL/min/1.73   CBC Auto Differential    Collection Time: 08/02/23  5:21 PM    Specimen: Blood   Result Value Ref Range    WBC 6.79 3.40 - 10.80 10*3/mm3    RBC 3.77 3.77 - 5.28 10*6/mm3    Hemoglobin 11.3 (L) 12.0 - 15.9 g/dL    Hematocrit 35.0 34.0 - 46.6 %    MCV 92.8 79.0 - 97.0 fL    MCH 30.0 26.6 - 33.0 pg    MCHC 32.3 31.5 - 35.7 g/dL    RDW 12.0 (L) 12.3 - 15.4 %    RDW-SD 40.9 37.0 - 54.0 fl    MPV 9.4 6.0 - 12.0 fL    Platelets 229 140 - 450 10*3/mm3    Neutrophil % 85.2 (H) 42.7 - 76.0 %    Lymphocyte % 6.0 (L) 19.6 - 45.3 %    Monocyte % 6.9 5.0 - 12.0 %    Eosinophil % 1.2 0.3 - 6.2 %    Basophil % 0.1 0.0 - 1.5 %    Immature Grans % 0.6 (H) 0.0 - 0.5 %    Neutrophils, Absolute 5.78 1.70 - 7.00 10*3/mm3    Lymphocytes, Absolute 0.41 (L) 0.70 - 3.10 10*3/mm3    Monocytes, Absolute 0.47 0.10 - 0.90 10*3/mm3    Eosinophils, Absolute 0.08 0.00 - 0.40 10*3/mm3    Basophils, Absolute 0.01 0.00 - 0.20 10*3/mm3    Immature Grans, Absolute 0.04 0.00 - 0.05 10*3/mm3    nRBC 0.0 0.0 - 0.2 /100 WBC   Green Top (Gel)    Collection Time: 08/02/23  5:21 PM   Result Value Ref Range    Extra Tube Hold for add-ons.    Lavender Top    Collection Time: 08/02/23  5:21 PM   Result Value Ref Range    Extra Tube hold  for add-on    Gold Top - SST    Collection Time: 08/02/23  5:21 PM   Result Value Ref Range    Extra Tube Hold for add-ons.    Light Blue Top    Collection Time: 08/02/23  5:21 PM   Result Value Ref Range    Extra Tube Hold for add-ons.    Procalcitonin    Collection Time: 08/02/23  5:21 PM    Specimen: Blood   Result Value Ref Range    Procalcitonin 0.07 0.00 - 0.25 ng/mL   C-reactive Protein    Collection Time: 08/02/23  5:21 PM    Specimen: Blood   Result Value Ref Range    C-Reactive Protein 2.32 (H) 0.00 - 0.50 mg/dL   Sedimentation Rate    Collection Time: 08/02/23  5:21 PM    Specimen: Blood   Result Value Ref Range    Sed Rate 35 (H) 0 - 30 mm/hr   Lactic Acid, Plasma    Collection Time: 08/02/23  8:27 PM    Specimen: Arm, Right; Blood   Result Value Ref Range    Lactate 2.3 (C) 0.5 - 2.0 mmol/L       I ordered the above labs and reviewed the results.    RADIOLOGY  XR Foot 3+ View Left    Result Date: 8/2/2023  LEFT FOOT, 3 VIEWS  HISTORY: Foot wound/infection  COMPARISON: Unavailable  FINDINGS: Osteopenia. No evidence for fracture. Degenerative changes of the first MTP joint and first metatarsal head.      No acute findings  This report was finalized on 8/2/2023 8:19 PM by Dr. Dimitrios Rodrigez M.D.       I ordered the above noted radiological studies. I reviewed the images and results. I agree with the radiologist interpretation.    PROCEDURES  Procedures    MEDICATIONS GIVEN IN ER  Medications   doxycycline (VIBRAMYCIN) 100 mg in sodium chloride 0.9 % 100 mL IVPB-VTB (100 mg Intravenous New Bag 8/2/23 0504)       PROGRESS, DATA ANALYSIS, CONSULTS, AND MEDICAL DECISION MAKING  A complete history and physical exam have been performed.  All available laboratory and imaging results have been reviewed by myself prior to disposition.    MDM    After the initial H&P, I discussed pertinent information from history and physical exam with patient/family.  Discussed differential diagnosis.  Discussed plan for ED  evaluation/workup/treatment.  All questions answered.  Patient/family is agreeable with plan.  ED Course as of 08/02/23 2342   Wed Aug 02, 2023   1851 Patient was sent here for concerns of a persistent infection to his second toe on his left foot.  Patient is stable in no distress.  Vital signs are unremarkable.  I saw him as I am leaving soon to the emergency department just to assess him to make sure he had no emergent condition that needed immediate assistant prior to my partner arriving in the emergency department. [MM]   2034 I reviewed left foot x-ray in PACS, no fracture per my read. [JG]   2301 Patient afebrile but was tachycardic, no leukocytosis but does have elevated inflammatory markers as well as mild elevation of lactic acid.  X-ray shows no obvious osteomyelitis.  It appears patient is currently on doxycycline.  It seems patient's podiatrist was concern for possible osteomyelitis.  Continuing with doxycycline, plan for admission to obtain MRI imaging to rule out osteomyelitis. [JG]   2341 Phone call with KEELY Rodriguez for [facility].  Discussed the patient, relevant history, exam, diagnostics, ED findings/progress, and concerns. They agree to admit the patient to [facility] observation under Dr. Curtis. Care assumed by the admitting physician at this time.     [JG]      ED Course User Index  [JG] Paulo Lake MD  [MM] Sin Hernandez MD       AS OF 23:42 EDT VITALS:    BP - 109/66  HR - 98  TEMP - 97.4 øF (36.3 øC)  O2 SATS - 96%    DIAGNOSIS  Final diagnoses:   Cellulitis of toe of left foot   ESR raised   CRP elevated   Tachycardia   Hyperglycemia         DISPOSITION  ADMISSION    Discussed treatment plan and reason for admission with pt/family and admitting physician.  Pt/family voiced understanding of the plan for admission for further testing/treatment as needed.        Paulo Lake MD  08/02/23 2342

## 2023-08-02 NOTE — ED NOTES
Patient from home via pv; states she has previous dx hammer toe on left foot and states the bone is now exposed. Seen at pediatry office yesterday and was referred to ER. Denies any shortness of breath at this time.

## 2023-08-03 ENCOUNTER — APPOINTMENT (OUTPATIENT)
Dept: MRI IMAGING | Facility: HOSPITAL | Age: 88
DRG: 616 | End: 2023-08-03
Payer: MEDICARE

## 2023-08-03 PROBLEM — J96.11 CHRONIC RESPIRATORY FAILURE WITH HYPOXIA: Status: ACTIVE | Noted: 2023-08-03

## 2023-08-03 LAB
ANION GAP SERPL CALCULATED.3IONS-SCNC: 5.4 MMOL/L (ref 5–15)
ANION GAP SERPL CALCULATED.3IONS-SCNC: 8.9 MMOL/L (ref 5–15)
BUN SERPL-MCNC: 18 MG/DL (ref 8–23)
BUN SERPL-MCNC: 23 MG/DL (ref 8–23)
BUN/CREAT SERPL: 39.1 (ref 7–25)
BUN/CREAT SERPL: 53.5 (ref 7–25)
CALCIUM SPEC-SCNC: 8.7 MG/DL (ref 8.6–10.5)
CALCIUM SPEC-SCNC: 8.8 MG/DL (ref 8.6–10.5)
CHLORIDE SERPL-SCNC: 100 MMOL/L (ref 98–107)
CHLORIDE SERPL-SCNC: 103 MMOL/L (ref 98–107)
CO2 SERPL-SCNC: 35.1 MMOL/L (ref 22–29)
CO2 SERPL-SCNC: 36.6 MMOL/L (ref 22–29)
CREAT SERPL-MCNC: 0.43 MG/DL (ref 0.57–1)
CREAT SERPL-MCNC: 0.46 MG/DL (ref 0.57–1)
D-LACTATE SERPL-SCNC: 1.3 MMOL/L (ref 0.5–2)
D-LACTATE SERPL-SCNC: 3.1 MMOL/L (ref 0.5–2)
DEPRECATED RDW RBC AUTO: 39.8 FL (ref 37–54)
EGFRCR SERPLBLD CKD-EPI 2021: 93.3 ML/MIN/1.73
EGFRCR SERPLBLD CKD-EPI 2021: 94.9 ML/MIN/1.73
ERYTHROCYTE [DISTWIDTH] IN BLOOD BY AUTOMATED COUNT: 12 % (ref 12.3–15.4)
GLUCOSE BLDC GLUCOMTR-MCNC: 153 MG/DL (ref 70–130)
GLUCOSE BLDC GLUCOMTR-MCNC: 168 MG/DL (ref 70–130)
GLUCOSE BLDC GLUCOMTR-MCNC: 208 MG/DL (ref 70–130)
GLUCOSE BLDC GLUCOMTR-MCNC: 264 MG/DL (ref 70–130)
GLUCOSE SERPL-MCNC: 154 MG/DL (ref 65–99)
GLUCOSE SERPL-MCNC: 192 MG/DL (ref 65–99)
HBA1C MFR BLD: 8.7 % (ref 4.8–5.6)
HCT VFR BLD AUTO: 30.7 % (ref 34–46.6)
HGB BLD-MCNC: 10.2 G/DL (ref 12–15.9)
MCH RBC QN AUTO: 30.4 PG (ref 26.6–33)
MCHC RBC AUTO-ENTMCNC: 33.2 G/DL (ref 31.5–35.7)
MCV RBC AUTO: 91.6 FL (ref 79–97)
PLATELET # BLD AUTO: 196 10*3/MM3 (ref 140–450)
PMV BLD AUTO: 9.3 FL (ref 6–12)
POTASSIUM SERPL-SCNC: 4.1 MMOL/L (ref 3.5–5.2)
POTASSIUM SERPL-SCNC: 4.3 MMOL/L (ref 3.5–5.2)
RBC # BLD AUTO: 3.35 10*6/MM3 (ref 3.77–5.28)
SODIUM SERPL-SCNC: 144 MMOL/L (ref 136–145)
SODIUM SERPL-SCNC: 145 MMOL/L (ref 136–145)
VANCOMYCIN SERPL-MCNC: 4.6 MCG/ML (ref 5–40)
WBC NRBC COR # BLD: 5.43 10*3/MM3 (ref 3.4–10.8)

## 2023-08-03 PROCEDURE — 94799 UNLISTED PULMONARY SVC/PX: CPT

## 2023-08-03 PROCEDURE — 82948 REAGENT STRIP/BLOOD GLUCOSE: CPT

## 2023-08-03 PROCEDURE — 85027 COMPLETE CBC AUTOMATED: CPT | Performed by: NURSE PRACTITIONER

## 2023-08-03 PROCEDURE — A9577 INJ MULTIHANCE: HCPCS | Performed by: HOSPITALIST

## 2023-08-03 PROCEDURE — 80202 ASSAY OF VANCOMYCIN: CPT | Performed by: HOSPITALIST

## 2023-08-03 PROCEDURE — 83605 ASSAY OF LACTIC ACID: CPT | Performed by: EMERGENCY MEDICINE

## 2023-08-03 PROCEDURE — 94664 DEMO&/EVAL PT USE INHALER: CPT

## 2023-08-03 PROCEDURE — 0 GADOBENATE DIMEGLUMINE 529 MG/ML SOLUTION: Performed by: HOSPITALIST

## 2023-08-03 PROCEDURE — 80048 BASIC METABOLIC PNL TOTAL CA: CPT | Performed by: HOSPITALIST

## 2023-08-03 PROCEDURE — 94761 N-INVAS EAR/PLS OXIMETRY MLT: CPT

## 2023-08-03 PROCEDURE — 63710000001 INSULIN LISPRO (HUMAN) PER 5 UNITS: Performed by: NURSE PRACTITIONER

## 2023-08-03 PROCEDURE — 73720 MRI LWR EXTREMITY W/O&W/DYE: CPT

## 2023-08-03 PROCEDURE — 36415 COLL VENOUS BLD VENIPUNCTURE: CPT | Performed by: EMERGENCY MEDICINE

## 2023-08-03 PROCEDURE — 83036 HEMOGLOBIN GLYCOSYLATED A1C: CPT | Performed by: NURSE PRACTITIONER

## 2023-08-03 PROCEDURE — 87070 CULTURE OTHR SPECIMN AEROBIC: CPT | Performed by: NURSE PRACTITIONER

## 2023-08-03 PROCEDURE — 87205 SMEAR GRAM STAIN: CPT | Performed by: NURSE PRACTITIONER

## 2023-08-03 PROCEDURE — 94640 AIRWAY INHALATION TREATMENT: CPT

## 2023-08-03 PROCEDURE — 25010000002 VANCOMYCIN 750 MG RECONSTITUTED SOLUTION 1 EACH VIAL: Performed by: HOSPITALIST

## 2023-08-03 PROCEDURE — 80048 BASIC METABOLIC PNL TOTAL CA: CPT | Performed by: NURSE PRACTITIONER

## 2023-08-03 RX ORDER — IPRATROPIUM BROMIDE AND ALBUTEROL SULFATE 2.5; .5 MG/3ML; MG/3ML
3 SOLUTION RESPIRATORY (INHALATION)
Status: DISCONTINUED | OUTPATIENT
Start: 2023-08-03 | End: 2023-08-08 | Stop reason: HOSPADM

## 2023-08-03 RX ORDER — ACETAMINOPHEN 160 MG/5ML
650 SOLUTION ORAL EVERY 4 HOURS PRN
Status: DISCONTINUED | OUTPATIENT
Start: 2023-08-03 | End: 2023-08-08 | Stop reason: HOSPADM

## 2023-08-03 RX ORDER — AMOXICILLIN 250 MG
2 CAPSULE ORAL 2 TIMES DAILY
Status: DISCONTINUED | OUTPATIENT
Start: 2023-08-03 | End: 2023-08-08 | Stop reason: HOSPADM

## 2023-08-03 RX ORDER — ONDANSETRON 4 MG/1
4 TABLET, FILM COATED ORAL EVERY 6 HOURS PRN
Status: DISCONTINUED | OUTPATIENT
Start: 2023-08-03 | End: 2023-08-08 | Stop reason: HOSPADM

## 2023-08-03 RX ORDER — ONDANSETRON 2 MG/ML
4 INJECTION INTRAMUSCULAR; INTRAVENOUS EVERY 6 HOURS PRN
Status: DISCONTINUED | OUTPATIENT
Start: 2023-08-03 | End: 2023-08-08 | Stop reason: HOSPADM

## 2023-08-03 RX ORDER — ACETAMINOPHEN 325 MG/1
650 TABLET ORAL EVERY 4 HOURS PRN
Status: DISCONTINUED | OUTPATIENT
Start: 2023-08-03 | End: 2023-08-08 | Stop reason: HOSPADM

## 2023-08-03 RX ORDER — SODIUM CHLORIDE 9 MG/ML
100 INJECTION, SOLUTION INTRAVENOUS CONTINUOUS
Status: DISCONTINUED | OUTPATIENT
Start: 2023-08-03 | End: 2023-08-04

## 2023-08-03 RX ORDER — DEXTROSE MONOHYDRATE 25 G/50ML
25 INJECTION, SOLUTION INTRAVENOUS
Status: DISCONTINUED | OUTPATIENT
Start: 2023-08-03 | End: 2023-08-08 | Stop reason: HOSPADM

## 2023-08-03 RX ORDER — ACETAMINOPHEN 650 MG/1
650 SUPPOSITORY RECTAL EVERY 4 HOURS PRN
Status: DISCONTINUED | OUTPATIENT
Start: 2023-08-03 | End: 2023-08-08 | Stop reason: HOSPADM

## 2023-08-03 RX ORDER — BISACODYL 5 MG/1
5 TABLET, DELAYED RELEASE ORAL DAILY PRN
Status: DISCONTINUED | OUTPATIENT
Start: 2023-08-03 | End: 2023-08-08 | Stop reason: HOSPADM

## 2023-08-03 RX ORDER — IBUPROFEN 600 MG/1
1 TABLET ORAL
Status: DISCONTINUED | OUTPATIENT
Start: 2023-08-03 | End: 2023-08-08 | Stop reason: HOSPADM

## 2023-08-03 RX ORDER — INSULIN LISPRO 100 [IU]/ML
2-7 INJECTION, SOLUTION INTRAVENOUS; SUBCUTANEOUS
Status: DISCONTINUED | OUTPATIENT
Start: 2023-08-03 | End: 2023-08-08 | Stop reason: HOSPADM

## 2023-08-03 RX ORDER — POLYETHYLENE GLYCOL 3350 17 G/17G
17 POWDER, FOR SOLUTION ORAL DAILY PRN
Status: DISCONTINUED | OUTPATIENT
Start: 2023-08-03 | End: 2023-08-08 | Stop reason: HOSPADM

## 2023-08-03 RX ORDER — SODIUM CHLORIDE 0.9 % (FLUSH) 0.9 %
10 SYRINGE (ML) INJECTION EVERY 12 HOURS SCHEDULED
Status: DISCONTINUED | OUTPATIENT
Start: 2023-08-03 | End: 2023-08-08 | Stop reason: HOSPADM

## 2023-08-03 RX ORDER — BISACODYL 10 MG
10 SUPPOSITORY, RECTAL RECTAL DAILY PRN
Status: DISCONTINUED | OUTPATIENT
Start: 2023-08-03 | End: 2023-08-08 | Stop reason: HOSPADM

## 2023-08-03 RX ORDER — SODIUM CHLORIDE 0.9 % (FLUSH) 0.9 %
10 SYRINGE (ML) INJECTION AS NEEDED
Status: DISCONTINUED | OUTPATIENT
Start: 2023-08-03 | End: 2023-08-08 | Stop reason: HOSPADM

## 2023-08-03 RX ORDER — SODIUM CHLORIDE 9 MG/ML
40 INJECTION, SOLUTION INTRAVENOUS AS NEEDED
Status: DISCONTINUED | OUTPATIENT
Start: 2023-08-03 | End: 2023-08-08 | Stop reason: HOSPADM

## 2023-08-03 RX ORDER — CALCIUM CARBONATE 500 MG/1
2 TABLET, CHEWABLE ORAL 2 TIMES DAILY PRN
Status: DISCONTINUED | OUTPATIENT
Start: 2023-08-03 | End: 2023-08-08 | Stop reason: HOSPADM

## 2023-08-03 RX ORDER — NICOTINE POLACRILEX 4 MG
15 LOZENGE BUCCAL
Status: DISCONTINUED | OUTPATIENT
Start: 2023-08-03 | End: 2023-08-08 | Stop reason: HOSPADM

## 2023-08-03 RX ORDER — ATORVASTATIN CALCIUM 10 MG/1
10 TABLET, FILM COATED ORAL
Status: DISCONTINUED | OUTPATIENT
Start: 2023-08-03 | End: 2023-08-08 | Stop reason: HOSPADM

## 2023-08-03 RX ADMIN — SODIUM CHLORIDE 100 ML/HR: 9 INJECTION, SOLUTION INTRAVENOUS at 20:19

## 2023-08-03 RX ADMIN — VANCOMYCIN HYDROCHLORIDE 750 MG: 750 INJECTION, POWDER, LYOPHILIZED, FOR SOLUTION INTRAVENOUS at 11:51

## 2023-08-03 RX ADMIN — INSULIN LISPRO 2 UNITS: 100 INJECTION, SOLUTION INTRAVENOUS; SUBCUTANEOUS at 10:23

## 2023-08-03 RX ADMIN — INSULIN LISPRO 2 UNITS: 100 INJECTION, SOLUTION INTRAVENOUS; SUBCUTANEOUS at 11:51

## 2023-08-03 RX ADMIN — SODIUM CHLORIDE 100 ML/HR: 9 INJECTION, SOLUTION INTRAVENOUS at 11:52

## 2023-08-03 RX ADMIN — IPRATROPIUM BROMIDE AND ALBUTEROL SULFATE 3 ML: 2.5; .5 SOLUTION RESPIRATORY (INHALATION) at 14:49

## 2023-08-03 RX ADMIN — SODIUM CHLORIDE 100 ML/HR: 9 INJECTION, SOLUTION INTRAVENOUS at 01:55

## 2023-08-03 RX ADMIN — INSULIN LISPRO 3 UNITS: 100 INJECTION, SOLUTION INTRAVENOUS; SUBCUTANEOUS at 17:29

## 2023-08-03 RX ADMIN — Medication 10 ML: at 20:04

## 2023-08-03 RX ADMIN — ATORVASTATIN CALCIUM 10 MG: 20 TABLET, FILM COATED ORAL at 10:23

## 2023-08-03 RX ADMIN — Medication 1 APPLICATION: at 17:29

## 2023-08-03 RX ADMIN — INSULIN LISPRO 4 UNITS: 100 INJECTION, SOLUTION INTRAVENOUS; SUBCUTANEOUS at 21:49

## 2023-08-03 RX ADMIN — GADOBENATE DIMEGLUMINE 7 ML: 529 INJECTION, SOLUTION INTRAVENOUS at 09:52

## 2023-08-03 RX ADMIN — Medication 10 ML: at 01:55

## 2023-08-03 RX ADMIN — IPRATROPIUM BROMIDE AND ALBUTEROL SULFATE 3 ML: 2.5; .5 SOLUTION RESPIRATORY (INHALATION) at 06:41

## 2023-08-03 RX ADMIN — Medication 10 ML: at 10:27

## 2023-08-03 NOTE — NURSING NOTE
"Cwon consult received for 2nd left toe and gluteal wounds. Patient assessed. She has a full thickness wound that measures 1.0 cm x 1.0 cm. Base with moist yellow and pink tissue. She denies pain. Her left DP pulse is strong and palpable. Etiology, as pt reports, is related to a LLE \"wrap\" that was on for a good length of time and rubbed her hammertoe, causing the wound. MRI from this morning does show osteomyelitis. From chart review, it looks as though Dr. Gould plans to involve vascular and have them evaluate.   Cwon recommends iodosorb gel with daily dressing changes until seen by vascular. She is agreeable.  Patient skin to sacrum and buttocks also assessed. Patient with a small partial thickness wound to left ischium that measures 1.0 cm x 1.0 cm x 0.1 cm. Stable and consistent with a stage 2 pressure injury that was present on admission. A sacral silicone border dressing is appropriately in place. I was able to peel dressing back to assess wound and replace. Patient is high risk for skin breakdown. I have requested a specialty low air loss mattress for appropriate pressure redistribution. Primary nurse aware.  Thank you for consult and please call with any questions or re-consult if needed.   "

## 2023-08-03 NOTE — ED NOTES
Nursing report ED to floor  Maite Pacheco  88 y.o.  female    HPI :   Chief Complaint   Patient presents with    Wound Check       Admitting doctor:   Buzz Gould MD    Admitting diagnosis:   The primary encounter diagnosis was Cellulitis of toe of left foot. Diagnoses of ESR raised, CRP elevated, Tachycardia, and Hyperglycemia were also pertinent to this visit.    Code status:   Current Code Status       Date Active Code Status Order ID Comments User Context       Prior            Allergies:   Sulfa antibiotics    Isolation:   No active isolations    Intake and Output  No intake or output data in the 24 hours ending 08/02/23 2350    Weight:       08/02/23  1709   Weight: 34.5 kg (76 lb)       Most recent vitals:   Vitals:    08/02/23 2214 08/02/23 2231 08/02/23 2301 08/02/23 2320   BP:  141/74 109/66    Pulse: 98 94 102 98   Resp:       Temp:       SpO2: 92% 95% 95% 96%   Weight:       Height:           Active LDAs/IV Access:   Lines, Drains & Airways       Active LDAs       Name Placement date Placement time Site Days    Peripheral IV 08/02/23 2029 Anterior;Right Forearm 08/02/23 2029  Forearm  less than 1                    Labs (abnormal labs have a star):   Labs Reviewed   COMPREHENSIVE METABOLIC PANEL - Abnormal; Notable for the following components:       Result Value    Glucose 330 (*)     BUN 33 (*)     Creatinine 0.46 (*)     CO2 39.0 (*)     BUN/Creatinine Ratio 71.7 (*)     Anion Gap 4.0 (*)     All other components within normal limits    Narrative:     GFR Normal >60  Chronic Kidney Disease <60  Kidney Failure <15    The GFR formula is only valid for adults with stable renal function between ages 18 and 70.   CBC WITH AUTO DIFFERENTIAL - Abnormal; Notable for the following components:    Hemoglobin 11.3 (*)     RDW 12.0 (*)     Neutrophil % 85.2 (*)     Lymphocyte % 6.0 (*)     Immature Grans % 0.6 (*)     Lymphocytes, Absolute 0.41 (*)     All other components within normal limits   LACTIC  "ACID, PLASMA - Abnormal; Notable for the following components:    Lactate 2.3 (*)     All other components within normal limits   C-REACTIVE PROTEIN - Abnormal; Notable for the following components:    C-Reactive Protein 2.32 (*)     All other components within normal limits   SEDIMENTATION RATE - Abnormal; Notable for the following components:    Sed Rate 35 (*)     All other components within normal limits   PROCALCITONIN - Normal    Narrative:     As a Marker for Sepsis (Non-Neonates):    1. <0.5 ng/mL represents a low risk of severe sepsis and/or septic shock.  2. >2 ng/mL represents a high risk of severe sepsis and/or septic shock.    As a Marker for Lower Respiratory Tract Infections that require antibiotic therapy:    PCT on Admission    Antibiotic Therapy       6-12 Hrs later    >0.5                Strongly Recommended  >0.25 - <0.5        Recommended   0.1 - 0.25          Discouraged              Remeasure/reassess PCT  <0.1                Strongly Discouraged     Remeasure/reassess PCT    As 28 day mortality risk marker: \"Change in Procalcitonin Result\" (>80% or <=80%) if Day 0 (or Day 1) and Day 4 values are available. Refer to http://www.[employer]-pct-calculator.com    Change in PCT <=80%  A decrease of PCT levels below or equal to 80% defines a positive change in PCT test result representing a higher risk for 28-day all-cause mortality of patients diagnosed with severe sepsis for septic shock.    Change in PCT >80%  A decrease of PCT levels of more than 80% defines a negative change in PCT result representing a lower risk for 28-day all-cause mortality of patients diagnosed with severe sepsis or septic shock.      BLOOD CULTURE   BLOOD CULTURE   RAINBOW DRAW    Narrative:     The following orders were created for panel order [place] Draw.  Procedure                               Abnormality         Status                     ---------                               -----------         ------                 "     Green Top (Gel)[[id number]]                                  Final result               Lavender Top[[id number]]                                     Final result               Gold Top - SST[[id number]]                                   Final result               Light Blue Top[[id number]]                                   Final result                 Please view results for these tests on the individual orders.   LACTIC ACID, REFLEX   CBC AND DIFFERENTIAL    Narrative:     The following orders were created for panel order CBC & Differential.  Procedure                               Abnormality         Status                     ---------                               -----------         ------                     CBC Auto Differential[[id number]]        Abnormal            Final result                 Please view results for these tests on the individual orders.   GREEN TOP   LAVENDER TOP   GOLD TOP - SST   LIGHT BLUE TOP       EKG:   No orders to display       Meds given in ED:   Medications   doxycycline (VIBRAMYCIN) 100 mg in sodium chloride 0.9 % 100 mL IVPB-VTB (100 mg Intravenous New Bag 23 9768)       Imaging results:  XR Foot 3+ View Left    Result Date: 2023  No acute findings  This report was finalized on 2023 8:19 PM by Dr. Dimitrios Rodrigez M.D.       Ambulatory status:   -     Social issues:   Social History     Socioeconomic History    Marital status: Single    Number of children: 0    Years of education: COLLEGE GRADUATE   Tobacco Use    Smoking status: Former     Packs/day: 1.00     Years: 50.00     Pack years: 50.00     Types: Cigarettes     Start date: 1966     Quit date: 2016     Years since quittin.5    Smokeless tobacco: Never   Vaping Use    Vaping Use: Never used   Substance and Sexual Activity    Alcohol use: No    Drug use: No    Sexual activity: Never       NIH Stroke Scale:       Quincy Orellana RN  23 23:50 EDT

## 2023-08-03 NOTE — PLAN OF CARE
Goal Outcome Evaluation:  Plan of Care Reviewed With: patient        Progress: no change  Outcome Evaluation: VSS, MRI completed this AM. IV antibiotics per orders. ACHS fingersticks. alert, oriented, and able to make needs known. Will continue to monitor.

## 2023-08-03 NOTE — PLAN OF CARE
Goal Outcome Evaluation:  Direct admit from ER for cellulitis of the toe. VSS though remains tachycardic. Pt compliant despite irritability.  A&Ox4. 3L o2 & remains on continuous pulse ox.   Up to bathroom with assist. Pt refuses to use bedside commode. Wound consult ordered for toe. Wound culture completed and sent to lab.  Mepalex applied to coccyx.  NS @ 100 ml/hr. No complaints of pain overnight. On call NP notified of repeat lactic acid when she was rounding. No new orders. MRI consent signed and faxed. Care continuing.

## 2023-08-03 NOTE — PROGRESS NOTES
"[facility] Clinical Pharmacy Services: Vancomycin Pharmacokinetic Initial Consult Note    Maite Pacheco is a 88 y.o. female who is on day 1 of pharmacy to dose vancomycin.    Indication: Skin and Soft Tissue  Consulting Provider: Dr. Gould  Planned Duration of Therapy: 7 days  Loading Dose Ordered or Given: ordered    Culture/Source: 8/3 wcx: in process  8/2 bcx: in process  Target: -600 mg/L.hr     Vitals/Labs  Ht: 157.5 cm (62\"); Wt: 36.4 kg (80 lb 4 oz)  Temp Readings from Last 1 Encounters:   08/03/23 97.3 øF (36.3 øC) (Oral)    Estimated Creatinine Clearance: 54 mL/min (A) (by C-G formula based on SCr of 0.43 mg/dL (L)).       Results from last 7 days   Lab Units 08/03/23  0218 08/02/23  1721   CREATININE mg/dL 0.43* 0.46*   WBC 10*3/mm3 5.43 6.79     Assessment/Plan:  Given patients advance age and body sizee ( 36kg) will plan to order a 20mg/kg loading dose and then check a 12 hr random to have some information on if we can schedule dosing or continue with intermittent. Scr is stable, but has very little body mass so it is expected to be so low.  Vancomycin Dose:   750 mg IV X1     Vanc Random has been ordered for 8/3 at 2300     Pharmacy will follow patient's kidney function and will adjust doses and obtain levels as necessary. Thank you for involving pharmacy in this patient's care. Please contact pharmacy with any questions or concerns.                           Anli Fleming [facility]  Clinical Pharmacist    "

## 2023-08-03 NOTE — PROGRESS NOTES
Hypertension is borderline uncontrolled.  Continue same medications, refilled the Lotrel.   Nutrition Services    Patient Name:  Maite Pacheco  YOB: 1935  MRN: [id number]  Admit Date:  8/2/2023  Assessment Date:  08/03/23    Comment: MST score 3 and BMI 14.68    88yoF with PMH of chronic pancreatitis, COPD, T2DM and osteoporosis presents to ED with c/o wound on L 2nd toe.     Nutrition assessment completed 2/2 MST score 3 and low BMI (14.68). Pt cleared for consistent carb diet, thin liquids today per APRN. PO intake 50% x 2 meals per I/Os. Weight history reviewed with 6-10 lb (up to 11.6%) weight loss past 6 months. Pt also endorsed decreased PO intake related to poor appetite prior to admission per RN documentation. Pt with multiple wounds per WOCN notes today. Will add ONS BID and ONS TID with meals for additional nutrition support. Plan to complete NFPE at later date. +BM today per I/Os.     Recommendations:   Continue current diet order and encourage adequate PO intake PRN.   Add Francisco BID with meals for healing support.   Add Boost Plus TID with meals for additional nutrition support.     RD will continue to follow course for PO intake and tolerance.     CLINICAL NUTRITION ASSESSMENT      Reason for Assessment BMI, MST score 2+     Diagnosis/Problem   Cellulitis, toe    Medical/Surgical History Past Medical History:   Diagnosis Date    Abnormal CT of the abdomen     Allergic rhinitis     Arthritis     Chronic pancreatitis     COPD (chronic obstructive pulmonary disease) 12/30/16    Diabetes mellitus     type 2    Eustachian tube dysfunction     History of pneumonia 12/2016    HL (hearing loss)     Hyperglycemia     Hyperlipidemia     Inguinal hernia     Neck pain     Osteopenia     Osteoporosis     Pancreatic cyst     Pneumonia 12/30/16    Urinary frequency        Past Surgical History:   Procedure Laterality Date    APPENDECTOMY N/A 1943    CARDIAC CATHETERIZATION N/A 10/25/2017    Procedure: Coronary angiography;  Surgeon: Gustavo Flores MD;  Location: [facility] INVASIVE  "LOCATION;  Service:     CARDIAC CATHETERIZATION N/A 10/25/2017    Procedure: Left heart cath;  Surgeon: Gustavo Flores MD;  Location:  [facility];  Service:     CARDIAC CATHETERIZATION N/A 10/25/2017    Procedure: Left ventriculography;  Surgeon: Gustavo Flores MD;  Location:  [facility] INV[facility]ATION;  Service:     COLONOSCOPY N/A 03/31/2003    Colonoscopy to the cecum, terminal ileoscopy, normal-Dr. Zahraa Brito     FEMUR IM NAILING/RODDING Left 2/18/2023    Procedure: LEFT FEMUR INTRAMEDULLARY NAILING/RODDING;  Surgeon: Best Luna MD;  Location: [facility] OR;  Service: Orthopedics;  Laterality: Left;    INGUINAL HERNIA REPAIR Left 01/18/2008    Open left inguinal hernia repair with mesh-Dr. Zahraa Brito         Encounter Information        Nutrition History:     Food Preferences:    Supplements:    Factors Affecting Intake: altered respiratory status, decreased appetite     Anthropometrics        Current Height  Current Weight  BMI kg/m2 Height: 157.5 cm (62\")  Weight: 36.4 kg (80 lb 4 oz) (08/03/23 0051)  Body mass index is 14.68 kg/mý.   Adjusted BMI (if applicable)    BMI Category Underweight (18.4 or below)       Admission Weight 76 lb (34.5 kg)       Ideal Body Weight (IBW) 110 lb (50 kg)   Adjusted IBW (if applicable)        Usual Body Weight (UBW) 105 lb (2020); 86 lb (2/2023)   Weight Change/Trend Loss, Amount/Timeframe: 6-10 lb (up to 11.6%) weight loss past 6 months per weight history        Weight History Wt Readings from Last 30 Encounters:   08/03/23 0051 36.4 kg (80 lb 4 oz)   08/02/23 1709 34.5 kg (76 lb)   07/12/23 1023 34.9 kg (76 lb 14.4 oz)   05/31/23 1353 36.8 kg (81 lb 1.6 oz)   03/22/23 1552 37.2 kg (82 lb)   02/18/23 0004 39 kg (86 lb)   11/17/22 0958 39 kg (86 lb)   08/25/22 1025 37.5 kg (82 lb 9.6 oz)   04/26/22 1121 40.2 kg (88 lb 9.6 oz)   04/25/22 1006 40.8 kg (90 lb)   12/14/21 0948 40.6 kg (89 lb 9.6 oz)   10/29/21 1105 43.3 kg (95 lb 6.4 " oz)   09/30/21 0953 43.5 kg (96 lb)   03/23/21 1004 45 kg (99 lb 3.2 oz)   12/08/20 1106 45.6 kg (100 lb 9.6 oz)   06/11/20 1027 48 kg (105 lb 12.8 oz)   12/20/19 1506 48.5 kg (107 lb)   11/15/19 1422 47.2 kg (104 lb)   06/25/19 0910 47.8 kg (105 lb 6.4 oz)   06/11/19 0929 48.2 kg (106 lb 3.2 oz)   01/09/19 1310 47.6 kg (105 lb)   12/26/18 0848 47.6 kg (105 lb)   06/25/18 0944 47.2 kg (104 lb)   04/26/18 0937 47.2 kg (104 lb)   04/11/18 1039 47.8 kg (105 lb 6.4 oz)   03/26/18 1259 47.6 kg (105 lb)   12/21/17 0918 47.3 kg (104 lb 3.2 oz)   11/16/17 0956 46.3 kg (102 lb)   10/25/17 0732 46.3 kg (102 lb)   10/12/17 1000 46.3 kg (102 lb)   10/04/17 1212 46.3 kg (102 lb)             Estimated/Assessed Needs        Current Weight  Weight: 36.4 kg (80 lb 4 oz) (08/03/23 0051)       Energy Requirements    Weight for Calculation 34.5 kg (admit weight)   Method for Estimation  35-40 kcal/kg   EST Needs (kcal/day) 6245-2269 kcal       Protein Requirements    Weight for Calculation 34.5 kg   EST Protein Needs (g/kg) 1.2 - 1.5 gm/kg   EST Daily Needs (g/day) 41-52 gm       Fluid Requirements     Method for Estimation 1 mL/kcal    EST Needs (mL/day)      Tests/Procedures        Tests/Procedures MRI     Labs       Pertinent Labs    Results from last 7 days   Lab Units 08/03/23  0218 08/02/23  1721   SODIUM mmol/L 144 142   POTASSIUM mmol/L 4.1 4.3   CHLORIDE mmol/L 100 99   CO2 mmol/L 35.1* 39.0*   BUN mg/dL 23 33*   CREATININE mg/dL 0.43* 0.46*   CALCIUM mg/dL 8.7 9.6   BILIRUBIN mg/dL  --  0.2   ALK PHOS U/L  --  82   ALT (SGPT) U/L  --  17   AST (SGOT) U/L  --  18   GLUCOSE mg/dL 192* 330*     Results from last 7 days   Lab Units 08/03/23  0218 08/02/23  1721   HEMOGLOBIN g/dL 10.2* 11.3*   HEMATOCRIT % 30.7* 35.0   WBC 10*3/mm3 5.43 6.79   ALBUMIN g/dL  --  3.8     Results from last 7 days   Lab Units 08/03/23  0218 08/02/23  1721   PLATELETS 10*3/mm3 196 229     COVID19   Date Value Ref Range Status   02/21/2023 Not Detected  Not Detected - Ref. Range Final     Lab Results   Component Value Date    HGBA1C 8.70 (H) 08/03/2023          Medications           Scheduled Medications atorvastatin, 10 mg, Oral, Q48H  insulin lispro, 2-7 Units, Subcutaneous, 4x Daily AC & at Bedtime  ipratropium-albuterol, 3 mL, Nebulization, Q8H - RT  senna-docusate sodium, 2 tablet, Oral, BID  sodium chloride, 10 mL, Intravenous, Q12H  Vancomycin Pharmacy Intermittent/Pulse Dosing, , Does not apply, Daily       Infusions Pharmacy to dose vancomycin,   sodium chloride, 100 mL/hr, Last Rate: 100 mL/hr (08/03/23 1152)       PRN Medications   acetaminophen **OR** acetaminophen **OR** acetaminophen    senna-docusate sodium **AND** polyethylene glycol **AND** bisacodyl **AND** bisacodyl    calcium carbonate    dextrose    dextrose    glucagon (human recombinant)    ondansetron **OR** ondansetron    Pharmacy to dose vancomycin    sodium chloride    sodium chloride     Physical Findings          Physical Appearance alert, oriented, on oxygen therapy, underweight   Oral/Mouth Cavity WNL   Edema  lower extremity , 1+ (trace)   Gastrointestinal last bowel movement: 8/3   Skin  bruising, pale, pressure injury: stage 1 coccyx, other: cellulitis of L 2nd toe   Tubes/Drains/Lines none   NFPE See Malnutrition Severity Assessment     Malnutrition Severity Assessment      Patient meets criteria for : Severe Malnutrition (intake <75% est needs for at least past 3 months, up to 11.6% weight loss past 6 months and measurably reduced functional capacity)  Malnutrition Type (last 8 hours)       Malnutrition Severity Assessment       Row Name 08/03/23 1648       Malnutrition Severity Assessment    Malnutrition Type Chronic Disease - Related Malnutrition      Row Name 08/03/23 1648       Insufficient Energy Intake     Insufficient Energy Intake Findings Severe    Insufficient Energy Intake  <75% of est. energy requirement for > or equal to 3 months      Row Name 08/03/23 1646        Unintentional Weight Loss     Unintentional Weight Loss Findings Severe    Unintentional Weight Loss  Weight loss greater than 10% in six months  6-10 lb (up to 11.6%) weight loss past 6 months per weight history      Row Name 08/03/23 1648       Declining Functional Status    Declining Functional Status Findings Measurably Reduced      Row Name 08/03/23 1648       Criteria Met (Must meet criteria for severity in at least 2 of these categories: M Wasting, Fat Loss, Fluid, Secondary Signs, Wt. Status, Intake)    Patient meets criteria for  Severe Malnutrition  intake <75% est needs for at least past 3 months, up to 11.6% weight loss past 6 months and measurably reduced functional capacity                       Current Nutrition Orders & Evaluation of Intake       Oral Nutrition     Food Allergies NKFA   Current PO Diet Diet: Regular/House Diet, Diabetic Diets; Consistent Carbohydrate; Texture: Regular Texture (IDDSI 7); Fluid Consistency: Thin (IDDSI 0)   Supplement n/a   PO Evaluation     % PO Intake 50% x 2 meals    # of Days Evaluated 1   --  PES STATEMENT / NUTRITION DIAGNOSIS      Nutrition Dx Problem  Problem: Malnutrition  Etiology: Medical Diagnosis and Factors Affecting Nutrition decreased appetite   Signs/Symptoms: PO intake, Report of Minimal PO Intake, BMI, Unintended Weight Change, and Report/Observation    Comment:    --  NUTRITION INTERVENTION / PLAN OF CARE      Intervention Goal(s) Nutrition support treatment, Improved nutrition related labs, Meet estimated needs, Disease management/therapy, Establish PO intake, Tolerate PO , and Appropriate weight gain         RD Intervention/Action Encourage intake, Follow Tx Progress, Care plan reviewed, and Recommend/ordered:          Prescription/Orders:       PO Diet       Supplements Boost Plus TID with meals, Francisco BID with meals      Snacks       Enteral Nutrition       Parenteral Nutrition    New Prescription Ordered? Yes   --      Monitor/Evaluation Per  protocol, I&O, PO intake, Supplement intake, Pertinent labs, Weight, Skin status, GI status, Symptoms   Discharge Plan/Needs Pending clinical course   Education Will instruct as appropriate   --    RD to follow per protocol.      Electronically signed by:  Merna Stoner RD  08/03/23 15:01 EDT

## 2023-08-04 ENCOUNTER — APPOINTMENT (OUTPATIENT)
Dept: GENERAL RADIOLOGY | Facility: HOSPITAL | Age: 88
DRG: 616 | End: 2023-08-04
Payer: MEDICARE

## 2023-08-04 PROBLEM — M86.9 OSTEOMYELITIS OF SECOND TOE OF LEFT FOOT: Status: ACTIVE | Noted: 2023-08-02

## 2023-08-04 PROBLEM — E43 SEVERE MALNUTRITION: Status: ACTIVE | Noted: 2023-08-04

## 2023-08-04 LAB
GLUCOSE BLDC GLUCOMTR-MCNC: 216 MG/DL (ref 70–130)
GLUCOSE BLDC GLUCOMTR-MCNC: 239 MG/DL (ref 70–130)
GLUCOSE BLDC GLUCOMTR-MCNC: 240 MG/DL (ref 70–130)
GLUCOSE BLDC GLUCOMTR-MCNC: 272 MG/DL (ref 70–130)
VANCOMYCIN SERPL-MCNC: 5.7 MCG/ML (ref 5–40)

## 2023-08-04 PROCEDURE — 94799 UNLISTED PULMONARY SVC/PX: CPT

## 2023-08-04 PROCEDURE — 25010000002 VANCOMYCIN 750 MG RECONSTITUTED SOLUTION 1 EACH VIAL: Performed by: HOSPITALIST

## 2023-08-04 PROCEDURE — 63710000001 INSULIN LISPRO (HUMAN) PER 5 UNITS: Performed by: NURSE PRACTITIONER

## 2023-08-04 PROCEDURE — 99223 1ST HOSP IP/OBS HIGH 75: CPT | Performed by: INTERNAL MEDICINE

## 2023-08-04 PROCEDURE — 71045 X-RAY EXAM CHEST 1 VIEW: CPT

## 2023-08-04 PROCEDURE — 82948 REAGENT STRIP/BLOOD GLUCOSE: CPT

## 2023-08-04 PROCEDURE — 80202 ASSAY OF VANCOMYCIN: CPT | Performed by: HOSPITALIST

## 2023-08-04 PROCEDURE — 94760 N-INVAS EAR/PLS OXIMETRY 1: CPT

## 2023-08-04 PROCEDURE — 94761 N-INVAS EAR/PLS OXIMETRY MLT: CPT

## 2023-08-04 PROCEDURE — 94664 DEMO&/EVAL PT USE INHALER: CPT

## 2023-08-04 RX ORDER — IPRATROPIUM BROMIDE AND ALBUTEROL SULFATE 2.5; .5 MG/3ML; MG/3ML
3 SOLUTION RESPIRATORY (INHALATION) EVERY 4 HOURS PRN
Status: DISCONTINUED | OUTPATIENT
Start: 2023-08-04 | End: 2023-08-08 | Stop reason: HOSPADM

## 2023-08-04 RX ADMIN — INSULIN LISPRO 4 UNITS: 100 INJECTION, SOLUTION INTRAVENOUS; SUBCUTANEOUS at 12:30

## 2023-08-04 RX ADMIN — INSULIN LISPRO 3 UNITS: 100 INJECTION, SOLUTION INTRAVENOUS; SUBCUTANEOUS at 17:00

## 2023-08-04 RX ADMIN — Medication 10 ML: at 21:31

## 2023-08-04 RX ADMIN — IPRATROPIUM BROMIDE AND ALBUTEROL SULFATE 3 ML: 2.5; .5 SOLUTION RESPIRATORY (INHALATION) at 23:15

## 2023-08-04 RX ADMIN — VANCOMYCIN HYDROCHLORIDE 750 MG: 750 INJECTION, POWDER, LYOPHILIZED, FOR SOLUTION INTRAVENOUS at 01:49

## 2023-08-04 RX ADMIN — VANCOMYCIN HYDROCHLORIDE 750 MG: 750 INJECTION, POWDER, LYOPHILIZED, FOR SOLUTION INTRAVENOUS at 14:42

## 2023-08-04 RX ADMIN — SODIUM CHLORIDE 100 ML/HR: 9 INJECTION, SOLUTION INTRAVENOUS at 01:58

## 2023-08-04 RX ADMIN — Medication 10 ML: at 09:39

## 2023-08-04 RX ADMIN — INSULIN LISPRO 3 UNITS: 100 INJECTION, SOLUTION INTRAVENOUS; SUBCUTANEOUS at 21:31

## 2023-08-04 RX ADMIN — IPRATROPIUM BROMIDE AND ALBUTEROL SULFATE 3 ML: 2.5; .5 SOLUTION RESPIRATORY (INHALATION) at 07:48

## 2023-08-04 RX ADMIN — INSULIN LISPRO 3 UNITS: 100 INJECTION, SOLUTION INTRAVENOUS; SUBCUTANEOUS at 09:39

## 2023-08-04 RX ADMIN — IPRATROPIUM BROMIDE AND ALBUTEROL SULFATE 3 ML: 2.5; .5 SOLUTION RESPIRATORY (INHALATION) at 15:07

## 2023-08-04 RX ADMIN — Medication 1 APPLICATION: at 09:38

## 2023-08-04 NOTE — CONSULTS
Referring Provider: Dr Dowd    Reason for Consultation: chronic osteomyelitis L 2nd toe    History of present illness:  Maite Pacheco is a 88 y.o. who I am asked to evaluate and give opinion for chronic osteomyelitis L 2nd toe. History is obtained from the patient and review of the old medical records which I summarize/synthesize as follows: She has a history of hammertoe. Back in May 2023, she had a Mohs procedure done and had the leg wrapped for about two weeks. Once she removed the dressing, she noticed an ulcer on the top of her second toe. She has been following w/ podiatry for this. She says she was recently given an antibiotic (the name of which she does not recall). She eventually developed exposed bone so was referred to the ER. MRI showed osteomyelitis of the left 2nd toe with septic arthritis. She is on vancomycin. Vascular surgery is planning a toe amputation soon.     Past Medical History:   Diagnosis Date    Abnormal CT of the abdomen     Allergic rhinitis     Arthritis     Chronic pancreatitis     COPD (chronic obstructive pulmonary disease) 12/30/16    Diabetes mellitus     type 2    Eustachian tube dysfunction     History of pneumonia 12/2016    HL (hearing loss)     Hyperglycemia     Hyperlipidemia     Inguinal hernia     Neck pain     Osteopenia     Osteoporosis     Pancreatic cyst     Pneumonia 12/30/16    Urinary frequency        Past Surgical History:   Procedure Laterality Date    APPENDECTOMY N/A 1943    CARDIAC CATHETERIZATION N/A 10/25/2017    Procedure: Coronary angiography;  Surgeon: Gustavo Flores MD;  Location: [facility] INVASIVE LOCATION;  Service:     CARDIAC CATHETERIZATION N/A 10/25/2017    Procedure: Left heart cath;  Surgeon: Gustavo Flores MD;  Location: [facility] INVASIVE LOCATION;  Service:     CARDIAC CATHETERIZATION N/A 10/25/2017    Procedure: Left ventriculography;  Surgeon: Gustavo Flores MD;  Location: [facility] INVASIVE LOCATION;  Service:      COLONOSCOPY N/A 03/31/2003    Colonoscopy to the cecum, terminal ileoscopy, normal-Dr. Zahraa Brito     FEMUR IM NAILING/RODDING Left 2/18/2023    Procedure: LEFT FEMUR INTRAMEDULLARY NAILING/RODDING;  Surgeon: Best Luna MD;  Location: [facility];  Service: Orthopedics;  Laterality: Left;    INGUINAL HERNIA REPAIR Left 01/18/2008    Open left inguinal hernia repair with mesh-Dr. Zahraa Brito        Social History:  Lives alone  Worked as a     Antibiotic allergies and intolerances:    Sulfa    Medications:    Current Facility-Administered Medications:     acetaminophen (TYLENOL) tablet 650 mg, 650 mg, Oral, Q4H PRN **OR** acetaminophen (TYLENOL) 160 MG/5ML solution 650 mg, 650 mg, Oral, Q4H PRN **OR** acetaminophen (TYLENOL) suppository 650 mg, 650 mg, Rectal, Q4H PRN, Jazzy Browning APRN    atorvastatin (LIPITOR) tablet 10 mg, 10 mg, Oral, Q48H, Jazzy Browning APRN, 10 mg at 08/03/23 1023    sennosides-docusate (PERICOLACE) 8.6-50 MG per tablet 2 tablet, 2 tablet, Oral, BID **AND** polyethylene glycol (MIRALAX) packet 17 g, 17 g, Oral, Daily PRN **AND** bisacodyl (DULCOLAX) EC tablet 5 mg, 5 mg, Oral, Daily PRN **AND** bisacodyl (DULCOLAX) suppository 10 mg, 10 mg, Rectal, Daily PRN, Jazzy Browning APRN    cadexomer iodine (IODOSORB) 0.9 % gel 1 application , 1 application , Topical, Daily, Buzz Gould MD, 1 application  at 08/04/23 0938    calcium carbonate (TUMS) chewable tablet 500 mg (200 mg elemental), 2 tablet, Oral, BID PRN, Jazzy Browning APRN    dextrose (D50W) (25 g/50 mL) IV injection 25 g, 25 g, Intravenous, Q15 Min PRN, Jazzy Browning APRN    dextrose (GLUTOSE) oral gel 15 g, 15 g, Oral, Q15 Min PRN, Jazzy Browning, SINA    glucagon (GLUCAGEN) injection 1 mg, 1 mg, Intramuscular, Q15 Min PRN, Jazzy Browning APRN    insulin lispro (HUMALOG/ADMELOG) injection 2-7 Units, 2-7 Units, Subcutaneous, 4x Daily AC & at Bedtime,  Jazzy Browning APRN, 4 Units at 08/04/23 1230    ipratropium-albuterol (DUO-NEB) nebulizer solution 3 mL, 3 mL, Nebulization, Q8H - RT, Jazzy Browning APRN, 3 mL at 08/04/23 0748    ondansetron (ZOFRAN) tablet 4 mg, 4 mg, Oral, Q6H PRN **OR** ondansetron (ZOFRAN) injection 4 mg, 4 mg, Intravenous, Q6H PRN, Jazzy Browning APRN    Pharmacy to dose vancomycin, , Does not apply, Continuous PRN, Buzz Gould MD    sodium chloride 0.9 % flush 10 mL, 10 mL, Intravenous, Q12H, Jazzy Browning APRN, 10 mL at 08/04/23 0939    sodium chloride 0.9 % flush 10 mL, 10 mL, Intravenous, PRN, Jazzy Browning APRN    sodium chloride 0.9 % infusion 40 mL, 40 mL, Intravenous, PRN, Jazzy Browning APRN    vancomycin 750 mg in sodium chloride 0.9 % 250 mL IVPB-VTB, 750 mg, Intravenous, Q12H, Joey Dowd MD    Vancomycin Pharmacy Intermittent/Pulse Dosing, , Does not apply, Daily, Buzz Gould MD      Objective   Vital Signs   Temp:  [97 øF (36.1 øC)-97.7 øF (36.5 øC)] 97 øF (36.1 øC)  Heart Rate:  [] 95  Resp:  [18-22] 20  BP: (115-137)/(63-75) 115/68    Physical Exam:   General: awake, alert, NAD   Eyes: no scleral icterus  Cardiovascular: NR  Respiratory: normal work of breathing on 2L NC  GI: Abdomen is soft, not tender  :  no Nuñez catheter  MSK: thin musculature; L 2nd toe with wide but shallow ulcer; no surrounding erythema  Skin: No rashes  Neurological: Alert and oriented x 3  Psychiatric: Normal mood and affect   Vasc: PIV w/o erythema    Labs:     Lab Results   Component Value Date    WBC 5.43 08/03/2023    HGB 10.2 (L) 08/03/2023    HCT 30.7 (L) 08/03/2023    MCV 91.6 08/03/2023     08/03/2023     Lab Results   Component Value Date    GLUCOSE 154 (H) 08/03/2023    CALCIUM 8.8 08/03/2023     08/03/2023    K 4.3 08/03/2023    CO2 36.6 (H) 08/03/2023     08/03/2023    BUN 18 08/03/2023    CREATININE 0.46 (L) 08/03/2023    EGFRRESULT 84.9  "05/22/2023    EGFR 93.3 08/03/2023    BCR 39.1 (H) 08/03/2023    ANIONGAP 5.4 08/03/2023     Lab Results   Component Value Date    ALT 17 08/02/2023     Lab Results   Component Value Date    CRP 2.32 (H) 08/02/2023     Lab Results   Component Value Date    HGBA1C 8.70 (H) 08/03/2023     Lab Results   Component Value Date    VANCORANDOM 5.70 08/04/2023     Procal 0.07    Microbiology:  8/2 BCx: negative to date  8/3 Left Toe Wound Cx: NGTD    Radiology:  MRI L Foot: \"Soft tissue wound/ulcer dorsal to the head of the second proximal phalanx with underlying osteomyelitis involving the distal shaft and head of the second proximal phalanx and septic arthritis of   the second PIP joint as well as cellulitis of the second toe.     EKG personally reviewed w/ QTc 483 ms    ASSESSMENT/PLAN:  Chronic osteomyelitis and septic arthritis (PIP joint) of left 2nd toe  Hammertoe  History of Mohs procedure  COPD  Chronic hypoxic respiratory failure (2L NC at home)  History of tobacco use    Continue empiric vancomycin w/ goal -600. She is afebrile with a  normal WBC. Her blood and wound cultures are negative to date. Noted plans for left second toe amputation which will provide source control. Once amputation occurs, I think we can transition to an oral antibiotic for 3-5 days afterwards.     While the patient is on vancomycin, vancomycin levels will be ordered and reviewed with dose adjustments made as needed. The patient will have a daily creatinine level checked while on vancomycin as part of monitoring this medication.     Check CBC and BMP in the AM.     ID will follow.     "

## 2023-08-04 NOTE — PROGRESS NOTES
Name: Maite Pacheco ADMIT: 2023   : 1935  PCP: Rhys Thomas MD    MRN: [id number] LOS: 1 days   AGE/SEX: 88 y.o. female  ROOM: [facility]     Subjective   Subjective   Denies any specific complaint. No chest pain or shortness of breath.     Objective   Objective   Vital Signs  Temp:  [97 øF (36.1 øC)-97.7 øF (36.5 øC)] 97 øF (36.1 øC)  Heart Rate:  [] 95  Resp:  [18-22] 20  BP: (115-137)/(63-75) 115/68  SpO2:  [90 %-99 %] 99 %  on  Flow (L/min):  [3-4] 4;   Device (Oxygen Therapy): nasal cannula  Body mass index is 14.68 kg/mý.    Physical Exam  Constitutional:       General: She is not in acute distress.     Appearance: Normal appearance. She is not toxic-appearing.   HENT:      Head: Normocephalic and atraumatic.   Eyes:      Extraocular Movements: Extraocular movements intact.   Cardiovascular:      Rate and Rhythm: Normal rate and regular rhythm.   Pulmonary:      Effort: Pulmonary effort is normal. No respiratory distress.      Breath sounds: Normal breath sounds.   Abdominal:      General: Bowel sounds are normal.      Palpations: Abdomen is soft.      Tenderness: There is no abdominal tenderness. There is no guarding or rebound.   Musculoskeletal:         General: No swelling.      Cervical back: Normal range of motion.   Skin:     General: Skin is warm and dry.      Comments: See image regarding #2 left toe   Neurological:      General: No focal deficit present.      Mental Status: She is alert and oriented to person, place, and time.   Psychiatric:         Mood and Affect: Mood normal.         Behavior: Behavior normal.         Thought Content: Thought content normal.     Results Review  I reviewed the patient's new clinical results.  Results from last 7 days   Lab Units 23  0218 23  1721   WBC 10*3/mm3 5.43 6.79   HEMOGLOBIN g/dL 10.2* 11.3*   PLATELETS 10*3/mm3 196 229     Results from last 7 days   Lab Units 23  2303 23  0218 23  1721   SODIUM mmol/L  145 144 142   POTASSIUM mmol/L 4.3 4.1 4.3   CHLORIDE mmol/L 103 100 99   CO2 mmol/L 36.6* 35.1* 39.0*   BUN mg/dL 18 23 33*   CREATININE mg/dL 0.46* 0.43* 0.46*   GLUCOSE mg/dL 154* 192* 330*     Lab Results   Component Value Date    ANIONGAP 5.4 08/03/2023     Estimated Creatinine Clearance: 50.4 mL/min (A) (by C-G formula based on SCr of 0.46 mg/dL (L)).    Results from last 7 days   Lab Units 08/02/23  1721   ALBUMIN g/dL 3.8   BILIRUBIN mg/dL 0.2   ALK PHOS U/L 82   AST (SGOT) U/L 18   ALT (SGPT) U/L 17     Results from last 7 days   Lab Units 08/03/23  2303 08/03/23 0218 08/02/23  1721   CALCIUM mg/dL 8.8 8.7 9.6   ALBUMIN g/dL  --   --  3.8     Results from last 7 days   Lab Units 08/03/23  0218 08/02/23  2331 08/02/23 2027 08/02/23  1721   PROCALCITONIN ng/mL  --   --   --  0.07   LACTATE mmol/L 1.3 3.1* 2.3*  --      Hemoglobin A1C   Date/Time Value Ref Range Status   08/03/2023 0218 8.70 (H) 4.80 - 5.60 % Final     Glucose   Date/Time Value Ref Range Status   08/04/2023 0928 240 (H) 70 - 130 mg/dL Final     Comment:     Meter: [id number] : THERESA Santizo RN   08/03/2023 2059 264 (H) 70 - 130 mg/dL Final     Comment:     Meter: [id number] : [id number] Muna Dillon CNA   08/03/2023 1646 208 (H) 70 - 130 mg/dL Final     Comment:     Meter: [id number] : [id number] [place] Kathy CNA   08/03/2023 1139 168 (H) 70 - 130 mg/dL Final     Comment:     Meter: [id number] : [id number] Andrei Chavez CNA   08/03/2023 0729 153 (H) 70 - 130 mg/dL Final     Comment:     Meter: [id number] : [id number] Andrei Chavez CNA       XR Foot 3+ View Left    Result Date: 8/2/2023  No acute findings  This report was finalized on 8/2/2023 8:19 PM by Dr. Dimitrios Rodrigez M.D.      MRI Foot Left With & Without Contrast    Result Date: 8/3/2023  Soft tissue wound/ulcer dorsal to the head of the second proximal phalanx with underlying osteomyelitis involving the distal shaft and head of the  second proximal phalanx and septic arthritis of the second PIP joint as well as cellulitis of the second toe.  This report was finalized on 8/3/2023 2:23 PM by Dr. Humza Lai M.D.       Scheduled Meds  atorvastatin, 10 mg, Oral, Q48H  cadexomer iodine, 1 application , Topical, Daily  insulin lispro, 2-7 Units, Subcutaneous, 4x Daily AC & at Bedtime  ipratropium-albuterol, 3 mL, Nebulization, Q8H - RT  senna-docusate sodium, 2 tablet, Oral, BID  sodium chloride, 10 mL, Intravenous, Q12H  Vancomycin Pharmacy Intermittent/Pulse Dosing, , Does not apply, Daily    Continuous Infusions  Pharmacy to dose vancomycin,   sodium chloride, 100 mL/hr, Last Rate: 100 mL/hr (08/04/23 0158)    PRN Meds    acetaminophen **OR** acetaminophen **OR** acetaminophen    senna-docusate sodium **AND** polyethylene glycol **AND** bisacodyl **AND** bisacodyl    calcium carbonate    dextrose    dextrose    glucagon (human recombinant)    ondansetron **OR** ondansetron    Pharmacy to dose vancomycin    sodium chloride    sodium chloride    Pharmacy to dose vancomycin,   sodium chloride, 100 mL/hr, Last Rate: 100 mL/hr (08/04/23 0158)    Diet  Diet: Regular/House Diet, Diabetic Diets; Consistent Carbohydrate; Texture: Regular Texture (IDDSI 7); Fluid Consistency: Thin (IDDSI 0)    I have personally reviewed:  [x]  Medications  [x]  Laboratory   [x]  Microbiology   [x]  Radiology   []  EKG/Telemetry   []  Cardiology/Vascular   []  Pathology   []  Records      Assessment/Plan     Active Hospital Problems    Diagnosis  POA    **Cellulitis, toe [L03.039]  Yes    Severe malnutrition [E43]  Yes    Chronic respiratory failure with hypoxia [J96.11]  Unknown    COPD (chronic obstructive pulmonary disease) [J44.9]  Yes    Pulmonary hypertension [I27.20]  Yes    Type 2 diabetes mellitus [E11.9]  Yes    Hyperlipidemia [E78.5]  Yes      Resolved Hospital Problems   No resolved problems to display.     88 y.o. female left toe ulcer    Left #2 toe  cellulitis/osteomyelitis  -Continue antibiotics, wound care  -Consult vascular surgery + ID    COPD  Chronic respiratory failure  Smoker  -Flow (L/min):  [3-4] 4     Diabetes mellitus type 2  -A1c 8.70%  -Correctional insulin.   -Consider adding long-acting and mealtime insulin see how she does next 24 hours    Hyperlipidemia  -Atorvastatin    SCDs for DVT prophylaxis    Discussed with patient and nursing staff    Discharge: SAMM Dowd MD  [facility]  08/04/23

## 2023-08-04 NOTE — PROGRESS NOTES
"[facility] Clinical Pharmacy Services: Vancomycin Monitoring Note    Maite Pacheco is a 88 y.o. female who is on day 1/7 of pharmacy to dose vancomycin for Skin and Soft Tissue.    Previous Vancomycin Dose:   750 mg IV on 8/3/23 at 1151    Results from last 7 days   Lab Units 08/03/23  2303   VANCOMYCIN RM mcg/mL 4.60*     Vitals/Labs  Ht: 157.5 cm (62\"); Wt: 36.4 kg (80 lb 4 oz)   Temp Readings from Last 1 Encounters:   08/03/23 97.7 øF (36.5 øC)     Estimated Creatinine Clearance: 50.4 mL/min (A) (by C-G formula based on SCr of 0.46 mg/dL (L)).        Results from last 7 days   Lab Units 08/03/23  2303 08/03/23  0218 08/02/23  1721   CREATININE mg/dL 0.46* 0.43* 0.46*   WBC 10*3/mm3  --  5.43 6.79     Assessment/Plan    Vancomycin Dose: 750 mg IV now. Pharmacy will continue to intermittently pulse dose Vancomycin based on levels.   Next Level Date and Time: Vanc Random on 8/4/23 at 1000  We will continue to monitor patient changes and renal function     Thank you for involving pharmacy in this patient's care. Please contact pharmacy with any questions or concerns.       Edilberto Cespedes, [facility]  Clinical Pharmacist          "

## 2023-08-04 NOTE — CONSULTS
Name: Maite Pacheco ADMIT: 2023   : 1935  PCP: Rhys Thomas MD    MRN: [id number] LOS: 1 days   AGE/SEX: 88 y.o. female  ROOM: [facility] P483/1     Inpatient Vascular Surgery Consult  Consult performed by: Best Wheeler MD  Consult ordered by: Joey Dowd MD      CC: Left second toe joint ulcer  Subjective     History of Present Illness  88 y.o. female with a history of type 2 diabetes, COPD with chronic hypoxia who we were asked to see for a left second toe ulcer.    Review of Systems  Patient is anxious.  No fevers, no chest pain, no shortness of breath    History Review Reviewed Comments   Past Medical History:  [x]  Chronic pancreatitis, diabetes, COPD with respiratory failure   Past Surgical History: [x]  Left femur nailing, left inguinal hernia repair, recent Mohs procedure on left lower extremity   Family History: [x]  No DVT   Social History: [x]  Patient is a former smoker and quit in 2016     Scheduled Meds:     atorvastatin, 10 mg, Oral, Q48H  cadexomer iodine, 1 application , Topical, Daily  insulin lispro, 2-7 Units, Subcutaneous, 4x Daily AC & at Bedtime  ipratropium-albuterol, 3 mL, Nebulization, Q8H - RT  senna-docusate sodium, 2 tablet, Oral, BID  sodium chloride, 10 mL, Intravenous, Q12H  Vancomycin Pharmacy Intermittent/Pulse Dosing, , Does not apply, Daily      IV Meds:   Pharmacy to dose vancomycin,         Allergies: Sulfa antibiotics      Objective   Temp:  [97 øF (36.1 øC)-97.7 øF (36.5 øC)] 97 øF (36.1 øC)  Heart Rate:  [] 95  Resp:  [18-22] 20  BP: (115-137)/(63-75) 115/68    No intake/output data recorded.    Physical Exam  Vitals reviewed.   Constitutional:       Appearance: She is well-developed and underweight. She is ill-appearing.   Eyes:      Conjunctiva/sclera: Conjunctivae normal.   Cardiovascular:      Rate and Rhythm: Normal rate.      Pulses:           Dorsalis pedis pulses are 2+ on the right side and 2+ on the left side.   Pulmonary:       Effort: Pulmonary effort is normal.   Abdominal:      Palpations: Abdomen is soft.      Tenderness: There is no abdominal tenderness.   Musculoskeletal:      Right lower leg: Edema present.   Neurological:      Mental Status: She is alert and oriented to person, place, and time.       Data Reviewed:  CBC    Results from last 7 days   Lab Units 08/03/23  0218 08/02/23  1721   WBC 10*3/mm3 5.43 6.79   HEMOGLOBIN g/dL 10.2* 11.3*   PLATELETS 10*3/mm3 196 229     BMP   Results from last 7 days   Lab Units 08/03/23  2303 08/03/23  0218 08/02/23  1721   SODIUM mmol/L 145 144 142   POTASSIUM mmol/L 4.3 4.1 4.3   CHLORIDE mmol/L 103 100 99   CO2 mmol/L 36.6* 35.1* 39.0*   BUN mg/dL 18 23 33*   CREATININE mg/dL 0.46* 0.43* 0.46*   GLUCOSE mg/dL 154* 192* 330*     Coag     HbA1C   Lab Results   Component Value Date    HGBA1C 8.70 (H) 08/03/2023    HGBA1C 7.90 (H) 05/22/2023    HGBA1C 7.30 (H) 11/15/2022     Infection   Results from last 7 days   Lab Units 08/03/23  0522 08/02/23 2108 08/02/23 2027 08/02/23  1721   BLOODCX   --  No growth at 24 hours No growth at 24 hours  --    WOUNDCX  No growth  --   --   --    PROCALCITONIN ng/mL  --   --   --  0.07     Radiology(recent) XR Foot 3+ View Left    Result Date: 8/2/2023  No acute findings  This report was finalized on 8/2/2023 8:19 PM by Dr. Dimitrios Rodrigez M.D.      MRI Foot Left With & Without Contrast    Result Date: 8/3/2023  Soft tissue wound/ulcer dorsal to the head of the second proximal phalanx with underlying osteomyelitis involving the distal shaft and head of the second proximal phalanx and septic arthritis of the second PIP joint as well as cellulitis of the second toe.  This report was finalized on 8/3/2023 2:23 PM by Dr. Humza Lai M.D.       Labs significant for: 10.2    Imaging Studies:        Active Hospital Problems    Diagnosis  POA    **Cellulitis, toe [L03.039]  Yes    Severe malnutrition [E43]  Yes    Chronic respiratory failure with hypoxia  [J96.11]  Unknown    COPD (chronic obstructive pulmonary disease) [J44.9]  Yes    Pulmonary hypertension [I27.20]  Yes    Type 2 diabetes mellitus [E11.9]  Yes    Hyperlipidemia [E78.5]  Yes      Resolved Hospital Problems   No resolved problems to display.       Data Points:  During this visit the following were done:  Labs Reviewed [x]    Labs Ordered []    Radiology Reports Reviewed [x]    Radiology Ordered []    EKG, echo, and/or stress test reviewed []    Vascular lab results reviewed  []    Vascular lab images reviewed and interpreted per myself   []    Referring Provider Records Reviewed []    ER Records Reviewed []    Hospital Records Reviewed/Summarized [x]    History Obtained From Family []    Radiological images view and Interpreted per myself [x]    Case Discussed with referring provider []     Decision to obtain and request outside records  []    Total data points:4 or more    Active Hospital Problems:   Active Hospital Problems    Diagnosis  POA    **Cellulitis, toe [L03.039]  Yes    Severe malnutrition [E43]  Yes    Chronic respiratory failure with hypoxia [J96.11]  Unknown    COPD (chronic obstructive pulmonary disease) [J44.9]  Yes    Pulmonary hypertension [I27.20]  Yes    Type 2 diabetes mellitus [E11.9]  Yes    Hyperlipidemia [E78.5]  Yes      Resolved Hospital Problems   No resolved problems to display.      Assessment & Plan   [place]illin  Assessment / Plan         88 y.o. female with multiple medical problems including COPD on oxygen and a BMI of only 14.7.  She had a Mohs procedure on her left lower extremity in May.  She kept her leg wrapped for 2 weeks and then when removing this she had an ulcer over hammertoe of the left second digit.  She is followed with podiatry because of this.  However, she developed cellulitis and called her podiatrist who referred her to the emergency department.  She was admitted and an MRI was done showing septic arthritis of the joint.  The patient has easily  palpable pedal pulses.  I recommended left second toe amputation.  Honestly, this could potentially be done as an outpatient by her podiatrist but she prefers to get this done while in the hospital which I think is reasonable.  We will see if I can schedule this for tomorrow in the operating room.  Patient is not on anticoagulation.    I discussed the patients findings and my recommendations with patient.  Please call my office with any question: [phone]

## 2023-08-04 NOTE — PLAN OF CARE
Goal Outcome Evaluation:        Problem: Fall Injury Risk  Goal: Absence of Fall and Fall-Related Injury  Outcome: Ongoing, Progressing  Intervention: Identify and Manage Contributors  Recent Flowsheet Documentation  Taken 8/4/2023 1400 by Rosa Maria Santizo RN  Medication Review/Management: medications reviewed  Taken 8/4/2023 1200 by Rosa Maria Santizo RN  Medication Review/Management: medications reviewed  Taken 8/4/2023 1000 by Rosa Maria Santizo RN  Medication Review/Management: medications reviewed  Taken 8/4/2023 0800 by Rosa Maria Sanitzo RN  Medication Review/Management: medications reviewed  Intervention: Promote Injury-Free Environment  Recent Flowsheet Documentation  Taken 8/4/2023 1400 by Rosa Maria Santizo RN  Safety Promotion/Fall Prevention:   assistive device/personal items within reach   clutter free environment maintained   fall prevention program maintained   lighting adjusted   nonskid shoes/slippers when out of bed   room organization consistent   safety round/check completed  Taken 8/4/2023 1200 by Rosa Maria Santizo RN  Safety Promotion/Fall Prevention:   assistive device/personal items within reach   clutter free environment maintained   fall prevention program maintained   lighting adjusted   nonskid shoes/slippers when out of bed   room organization consistent   safety round/check completed  Taken 8/4/2023 1000 by Rosa Maria Santizo RN  Safety Promotion/Fall Prevention:   assistive device/personal items within reach   clutter free environment maintained   fall prevention program maintained   lighting adjusted   nonskid shoes/slippers when out of bed   room organization consistent   safety round/check completed  Taken 8/4/2023 0800 by Rosa Maria Santizo, RN  Safety Promotion/Fall Prevention:   assistive device/personal items within reach   clutter free environment maintained   fall prevention program maintained   lighting adjusted   nonskid shoes/slippers when out of bed   room organization  consistent   safety round/check completed     Problem: COPD (Chronic Obstructive Pulmonary Disease) Comorbidity  Goal: Maintenance of COPD Symptom Control  Outcome: Ongoing, Progressing  Intervention: Maintain COPD-Symptom Control  Recent Flowsheet Documentation  Taken 8/4/2023 1400 by Rosa Maria Santizo RN  Medication Review/Management: medications reviewed  Taken 8/4/2023 1200 by Rosa Maria Santizo RN  Medication Review/Management: medications reviewed  Taken 8/4/2023 1000 by Rosa Maria Santizo RN  Medication Review/Management: medications reviewed  Taken 8/4/2023 0800 by Rosa Maria Santizo RN  Medication Review/Management: medications reviewed     Problem: Skin Injury Risk Increased  Goal: Skin Health and Integrity  Outcome: Ongoing, Progressing  Intervention: Optimize Skin Protection  Recent Flowsheet Documentation  Taken 8/4/2023 1400 by Rosa Maria Santizo RN  Head of Bed (HOB) Positioning: HOB at 30-45 degrees  Taken 8/4/2023 1200 by Rosa Maria Santizo RN  Head of Bed (HOB) Positioning: HOB at 30-45 degrees  Taken 8/4/2023 1000 by Rosa Maria Santizo RN  Head of Bed (HOB) Positioning: HOB at 20-30 degrees  Taken 8/4/2023 0800 by Rosa Maria Santizo RN  Head of Bed (HOB) Positioning: HOB at 30-45 degrees     Problem: Adult Inpatient Plan of Care  Goal: Plan of Care Review  Outcome: Ongoing, Progressing  Goal: Patient-Specific Goal (Individualized)  Outcome: Ongoing, Progressing  Goal: Absence of Hospital-Acquired Illness or Injury  Outcome: Ongoing, Progressing  Intervention: Identify and Manage Fall Risk  Recent Flowsheet Documentation  Taken 8/4/2023 1400 by Rosa Maria Santizo RN  Safety Promotion/Fall Prevention:   assistive device/personal items within reach   clutter free environment maintained   fall prevention program maintained   lighting adjusted   nonskid shoes/slippers when out of bed   room organization consistent   safety round/check completed  Taken 8/4/2023 1200 by Rosa Maria Santizo RN  Safety  Promotion/Fall Prevention:   assistive device/personal items within reach   clutter free environment maintained   fall prevention program maintained   lighting adjusted   nonskid shoes/slippers when out of bed   room organization consistent   safety round/check completed  Taken 8/4/2023 1000 by Rosa Maria Santizo RN  Safety Promotion/Fall Prevention:   assistive device/personal items within reach   clutter free environment maintained   fall prevention program maintained   lighting adjusted   nonskid shoes/slippers when out of bed   room organization consistent   safety round/check completed  Taken 8/4/2023 0800 by Rosa Maria Santizo RN  Safety Promotion/Fall Prevention:   assistive device/personal items within reach   clutter free environment maintained   fall prevention program maintained   lighting adjusted   nonskid shoes/slippers when out of bed   room organization consistent   safety round/check completed  Intervention: Prevent Skin Injury  Recent Flowsheet Documentation  Taken 8/4/2023 1400 by Rosa Maria Santizo RN  Body Position:   sitting up in bed   position changed independently  Taken 8/4/2023 1200 by Rosa Maria Santizo RN  Body Position:   30 degrees   position changed independently  Taken 8/4/2023 1000 by Rosa Maria Santizo RN  Body Position:   position changed independently   30 degrees  Taken 8/4/2023 0800 by Rosa Maria Santizo RN  Body Position:   30 degrees   position changed independently  Intervention: Prevent Infection  Recent Flowsheet Documentation  Taken 8/4/2023 0800 by Rosa Maria Santizo RN  Infection Prevention:   environmental surveillance performed   equipment surfaces disinfected   hand hygiene promoted   rest/sleep promoted   single patient room provided  Goal: Optimal Comfort and Wellbeing  Outcome: Ongoing, Progressing  Intervention: Provide Person-Centered Care  Recent Flowsheet Documentation  Taken 8/4/2023 0800 by Rosa Maria Santizo RN  Trust Relationship/Rapport:   care explained    choices provided   emotional support provided   empathic listening provided   questions answered   questions encouraged   reassurance provided   thoughts/feelings acknowledged  Goal: Readiness for Transition of Care  Outcome: Ongoing, Progressing     Problem: COPD (Chronic Obstructive Pulmonary Disease) Comorbidity  Goal: Maintenance of COPD Symptom Control  Outcome: Ongoing, Progressing  Intervention: Maintain COPD-Symptom Control  Recent Flowsheet Documentation  Taken 8/4/2023 1400 by Rosa Maria Santizo RN  Medication Review/Management: medications reviewed  Taken 8/4/2023 1200 by Rosa Maria Santizo RN  Medication Review/Management: medications reviewed  Taken 8/4/2023 1000 by Rosa Maria Santizo RN  Medication Review/Management: medications reviewed  Taken 8/4/2023 0800 by Rosa Maria Santizo RN  Medication Review/Management: medications reviewed     Problem: Skin Injury Risk Increased  Goal: Skin Health and Integrity  Outcome: Ongoing, Progressing  Intervention: Optimize Skin Protection  Recent Flowsheet Documentation  Taken 8/4/2023 1400 by Rosa Maria Santizo RN  Head of Bed (HOB) Positioning: HOB at 30-45 degrees  Taken 8/4/2023 1200 by Rosa Maria Santizo RN  Head of Bed (HOB) Positioning: HOB at 30-45 degrees  Taken 8/4/2023 1000 by Rosa Maria Santizo RN  Head of Bed (HOB) Positioning: HOB at 20-30 degrees  Taken 8/4/2023 0800 by Rosa Maria Santizo RN  Head of Bed (HOB) Positioning: HOB at 30-45 degrees     Problem: Adult Inpatient Plan of Care  Goal: Absence of Hospital-Acquired Illness or Injury  Outcome: Ongoing, Progressing  Intervention: Identify and Manage Fall Risk  Recent Flowsheet Documentation  Taken 8/4/2023 1400 by Rosa Maria Santizo RN  Safety Promotion/Fall Prevention:   assistive device/personal items within reach   clutter free environment maintained   fall prevention program maintained   lighting adjusted   nonskid shoes/slippers when out of bed   room organization consistent   safety round/check  completed  Taken 8/4/2023 1200 by Rosa Maria Santizo RN  Safety Promotion/Fall Prevention:   assistive device/personal items within reach   clutter free environment maintained   fall prevention program maintained   lighting adjusted   nonskid shoes/slippers when out of bed   room organization consistent   safety round/check completed  Taken 8/4/2023 1000 by Rosa Maria Santizo RN  Safety Promotion/Fall Prevention:   assistive device/personal items within reach   clutter free environment maintained   fall prevention program maintained   lighting adjusted   nonskid shoes/slippers when out of bed   room organization consistent   safety round/check completed  Taken 8/4/2023 0800 by Rosa Maria Santizo RN  Safety Promotion/Fall Prevention:   assistive device/personal items within reach   clutter free environment maintained   fall prevention program maintained   lighting adjusted   nonskid shoes/slippers when out of bed   room organization consistent   safety round/check completed  Intervention: Prevent Skin Injury  Recent Flowsheet Documentation  Taken 8/4/2023 1400 by Rosa Maria Santizo RN  Body Position:   sitting up in bed   position changed independently  Taken 8/4/2023 1200 by Rosa Maria Santizo RN  Body Position:   30 degrees   position changed independently  Taken 8/4/2023 1000 by Rosa Maria Santizo RN  Body Position:   position changed independently   30 degrees  Taken 8/4/2023 0800 by Rosa Maria Santizo RN  Body Position:   30 degrees   position changed independently  Intervention: Prevent Infection  Recent Flowsheet Documentation  Taken 8/4/2023 0800 by Rosa Maria Santizo RN  Infection Prevention:   environmental surveillance performed   equipment surfaces disinfected   hand hygiene promoted   rest/sleep promoted   single patient room provided     Problem: Adult Inpatient Plan of Care  Goal: Absence of Hospital-Acquired Illness or Injury  Intervention: Prevent Skin Injury  Recent Flowsheet Documentation  Taken 8/4/2023  1400 by Rosa Maria Santizo RN  Body Position:   sitting up in bed   position changed independently  Taken 8/4/2023 1200 by Rosa Maria aSntizo RN  Body Position:   30 degrees   position changed independently  Taken 8/4/2023 1000 by Rosa Maria Santizo RN  Body Position:   position changed independently   30 degrees  Taken 8/4/2023 0800 by Rosa Maria Santizo RN  Body Position:   30 degrees   position changed independently     Problem: Adult Inpatient Plan of Care  Goal: Absence of Hospital-Acquired Illness or Injury  Intervention: Prevent Infection  Recent Flowsheet Documentation  Taken 8/4/2023 0800 by Rosa Maria Santizo RN  Infection Prevention:   environmental surveillance performed   equipment surfaces disinfected   hand hygiene promoted   rest/sleep promoted   single patient room provided     Problem: Adult Inpatient Plan of Care  Goal: Optimal Comfort and Wellbeing  Outcome: Ongoing, Progressing  Intervention: Provide Person-Centered Care  Recent Flowsheet Documentation  Taken 8/4/2023 0800 by Rosa Maria Santizo RN  Trust Relationship/Rapport:   care explained   choices provided   emotional support provided   empathic listening provided   questions answered   questions encouraged   reassurance provided   thoughts/feelings acknowledged

## 2023-08-04 NOTE — PROGRESS NOTES
"[facility] Clinical Pharmacy Services: Vancomycin Monitoring Note    Maite Pacheco is a 88 y.o. female who is on day 2/7 of pharmacy to dose vancomycin for Skin and Soft Tissue.    Previous Vancomycin Dose:  Intermittent dosing, however has had 750 mg IV every  12 hours x 2 doses  Updated Cultures and Sensitivities:  8/2 Blood = NGTD  8/3 Wound = NGTD    Results from last 7 days   Lab Units 08/04/23  0953 08/03/23  2303   VANCOMYCIN RM mcg/mL 5.70 4.60*     Vitals/Labs  Ht: 157.5 cm (62\"); Wt: 36.4 kg (80 lb 4 oz)   Temp Readings from Last 1 Encounters:   08/04/23 97.5 øF (36.4 øC) (Oral)     Estimated Creatinine Clearance: 50.4 mL/min (A) (by C-G formula based on SCr of 0.46 mg/dL (L)).       Results from last 7 days   Lab Units 08/03/23  2303 08/03/23  0218 08/02/23  1721   CREATININE mg/dL 0.46* 0.43* 0.46*   WBC 10*3/mm3  --  5.43 6.79     Assessment/Plan  Patient with level appropriate to redose. Patient with estimated AUC if this 750 dose is repeated Q12 would be = 519 mg/L*hr.  Current Vancomycin Dose: 750 mg IV every  12  hours; provides a predicted  mg/L.hr   Next Level Date and Time: Vanc Trough on Sunday 8/6 at 1330  We will continue to monitor patient changes and renal function     Thank you for involving pharmacy in this patient's care. Please contact pharmacy with any questions or concerns.       Evelyn La, [facility]  Clinical Pharmacist          "

## 2023-08-04 NOTE — PLAN OF CARE
Goal Outcome Evaluation:  Plan of Care Reviewed With: patient        Progress: no change  Outcome Evaluation: Patient with no new complaints, ambulating to BR with assist x1 and walker. Patient with mild shortness of breath during activity but resolves with rest. Denied pain this shift. No increasing o2 requirements. IV fluids and ABX given as ordered, VSS, will continue to monitor.

## 2023-08-04 NOTE — CASE MANAGEMENT/SOCIAL WORK
Discharge Planning Assessment  [facility]     Patient Name: Maite Pacheco  MRN: [id number]  Today's Date: 8/4/2023    Admit Date: 8/2/2023    Plan: Home with [facility]   Discharge Needs Assessment       Row Name 08/04/23 1657       Living Environment    People in Home alone    Current Living Arrangements home    Potentially Unsafe Housing Conditions none    Primary Care Provided by self    Provides Primary Care For no one    Family Caregiver if Needed none    Quality of Family Relationships non-existent    Able to Return to Prior Arrangements yes       Resource/Environmental Concerns    Transportation Concerns none       Transition Planning    Patient/Family Anticipates Transition to home    Patient/Family Anticipated Services at Transition home health care    Transportation Anticipated family or friend will provide       Discharge Needs Assessment    Readmission Within the Last 30 Days no previous admission in last 30 days    Equipment Currently Used at Home oxygen;grab bar;shower chair    Concerns to be Addressed basic needs;adjustment to diagnosis/illness    Anticipated Changes Related to Illness other (see comments)  need for assistance with wound care, ADL    Equipment Needed After Discharge walker, rolling                   Discharge Plan       Row Name 08/04/23 1647       Plan    Plan Home with [facility]    Patient/Family in Agreement with Plan other (see comments)  pt said she has no family. Voice mail left for Echo Sullivan, pt's firend and HCS with call back number if there are discharge concerns we can assist with.    Provided Post Acute Provider List? Yes    Post Acute Provider List Nursing Home;Home Health    Delivered To Patient    Method of Delivery In person    Plan Comments Pt confirmed the address, PCP and [facility] are correct, she said she lives alone in a patio home (single level and no steps to enter), pt saaaaaid she drives and has transportation,  she said she  "can afford her Rx, does minimal cooking and cleaning and orders food at times.  Pt confirmed she has been to [facility] \"but I don't want to go again, I'm going home\" she said she had [facility] in the past and wants to use them again and would like the same nurse she had the last time.  Pt said her only DME is home oxygen from [place] that she uses 24/7, shower bars and has a shower seat but does not use it.  Pt said she will discharge home and friends will provide her with transportation.      Beata Parra RN                  Continued Care and Services - Admitted Since 8/2/2023       Durable Medical Equipment       Service Provider Request Status Selected Services Address Phone Fax Patient Preferred    [facility]ending - Request Sent N[facility]A [address] [id number] [phone] --              Home Medical Care       Service Provider Request Status Selected Services Address Phone Fax Patient Preferred    [facility] Accepted N/A [address], [address], [facility] [id number] [phone] --                  Expected Discharge Date and Time       Expected Discharge Date Expected Discharge Time    Aug 10, 2023            Demographic Summary       Row Name 08/04/23 1657       General Information    Admission Type inpatient    Arrived From home    Required Notices Provided Important Message from Medicare    Referral Source admission list    Reason for Consult discharge planning    Preferred Language English       Contact Information    Permission Granted to Share Info With family/designee                   Functional Status       Row Name 08/04/23 1656       Functional Status, IADL    Medications independent    Meal Preparation independent    Housekeeping independent    Laundry independent    Shopping independent             Beata Parra RN    "

## 2023-08-04 NOTE — DISCHARGE PLACEMENT REQUEST
"Albert Sharif (88 y.o. Female)       Date of Birth   1935    Social Security Number       Address   [address] [place] [address]    Home Phone   [phone]    MRN   [id number]       Christian   Alevism    Marital Status   Single                            Admission Date   8/2/23    Admission Type   Emergency    Admitting Provider   Buzz Gould MD    Attending Provider   Joey Dowd MD    Department, Room/Bed   [address], 83/1       Discharge Date       Discharge Disposition       Discharge Destination                                 Attending Provider: Joey Dowd MD    Allergies: Sulfa Antibiotics    Isolation: None   Infection: None   Code Status: CPR    Ht: 157.5 cm (62\")   Wt: 36.4 kg (80 lb 4 oz)    Admission Cmt: None   Principal Problem: Cellulitis, toe [L03.039]                   Active Insurance as of 8/2/2023       Primary Coverage       Payor Plan Insurance Group Employer/Plan Group    MEDICARE MEDICARE A & B        Payor Plan Address Payor Plan Phone Number Payor Plan Fax Number Effective Dates    [address] [phone]  4/1/2002 - None Entered    [facility] [id number]         Subscriber Name Subscriber Birth Date Member ID       ALBERT SHARIF 1935 [id number]               Secondary Coverage       Payor Plan Insurance Group Employer/Plan Group    AARP MC SUP AAR HEALTH CARE OPTIONS        Payor Plan Address Payor Plan Phone Number Payor Plan Fax Number Effective Dates    [facility] [phone]  1/1/2016 - None Entered    [address]       [facility] [address]         Subscriber Name Subscriber Birth Date Member ID       ALBERT SHARIF 1935 [id number]                     Emergency Contacts        (Rel.) Home Phone Work Phone Mobile Phone    Echo Sullivan (Friend) -- -- [phone]                "

## 2023-08-05 ENCOUNTER — ANESTHESIA (OUTPATIENT)
Dept: PERIOP | Facility: HOSPITAL | Age: 88
DRG: 616 | End: 2023-08-05
Payer: MEDICARE

## 2023-08-05 ENCOUNTER — ANESTHESIA EVENT (OUTPATIENT)
Dept: PERIOP | Facility: HOSPITAL | Age: 88
DRG: 616 | End: 2023-08-05
Payer: MEDICARE

## 2023-08-05 LAB
ANION GAP SERPL CALCULATED.3IONS-SCNC: 3.7 MMOL/L (ref 5–15)
BUN SERPL-MCNC: 13 MG/DL (ref 8–23)
BUN/CREAT SERPL: 40.6 (ref 7–25)
CALCIUM SPEC-SCNC: 8.6 MG/DL (ref 8.6–10.5)
CHLORIDE SERPL-SCNC: 100 MMOL/L (ref 98–107)
CO2 SERPL-SCNC: 38.3 MMOL/L (ref 22–29)
CREAT SERPL-MCNC: 0.32 MG/DL (ref 0.57–1)
DEPRECATED RDW RBC AUTO: 41.3 FL (ref 37–54)
EGFRCR SERPLBLD CKD-EPI 2021: 101.9 ML/MIN/1.73
ERYTHROCYTE [DISTWIDTH] IN BLOOD BY AUTOMATED COUNT: 12 % (ref 12.3–15.4)
GLUCOSE BLDC GLUCOMTR-MCNC: 148 MG/DL (ref 70–130)
GLUCOSE BLDC GLUCOMTR-MCNC: 173 MG/DL (ref 70–130)
GLUCOSE BLDC GLUCOMTR-MCNC: 191 MG/DL (ref 70–130)
GLUCOSE BLDC GLUCOMTR-MCNC: 195 MG/DL (ref 70–130)
GLUCOSE SERPL-MCNC: 156 MG/DL (ref 65–99)
HCT VFR BLD AUTO: 31.9 % (ref 34–46.6)
HGB BLD-MCNC: 10.3 G/DL (ref 12–15.9)
MCH RBC QN AUTO: 30.2 PG (ref 26.6–33)
MCHC RBC AUTO-ENTMCNC: 32.3 G/DL (ref 31.5–35.7)
MCV RBC AUTO: 93.5 FL (ref 79–97)
PLATELET # BLD AUTO: 186 10*3/MM3 (ref 140–450)
PMV BLD AUTO: 9.3 FL (ref 6–12)
POTASSIUM SERPL-SCNC: 3.8 MMOL/L (ref 3.5–5.2)
RBC # BLD AUTO: 3.41 10*6/MM3 (ref 3.77–5.28)
SODIUM SERPL-SCNC: 142 MMOL/L (ref 136–145)
WBC NRBC COR # BLD: 6.56 10*3/MM3 (ref 3.4–10.8)

## 2023-08-05 PROCEDURE — 94761 N-INVAS EAR/PLS OXIMETRY MLT: CPT

## 2023-08-05 PROCEDURE — 25010000002 VANCOMYCIN 750 MG RECONSTITUTED SOLUTION 1 EACH VIAL: Performed by: HOSPITALIST

## 2023-08-05 PROCEDURE — 25010000002 CEFAZOLIN PER 500 MG: Performed by: SURGERY

## 2023-08-05 PROCEDURE — 63710000001 INSULIN LISPRO (HUMAN) PER 5 UNITS: Performed by: SURGERY

## 2023-08-05 PROCEDURE — 25010000002 PROPOFOL 10 MG/ML EMULSION: Performed by: ANESTHESIOLOGY

## 2023-08-05 PROCEDURE — 94760 N-INVAS EAR/PLS OXIMETRY 1: CPT

## 2023-08-05 PROCEDURE — 80048 BASIC METABOLIC PNL TOTAL CA: CPT | Performed by: HOSPITALIST

## 2023-08-05 PROCEDURE — 94664 DEMO&/EVAL PT USE INHALER: CPT

## 2023-08-05 PROCEDURE — 0Y6S0Z0 DETACHMENT AT LEFT 2ND TOE, COMPLETE, OPEN APPROACH: ICD-10-PCS | Performed by: SURGERY

## 2023-08-05 PROCEDURE — 88305 TISSUE EXAM BY PATHOLOGIST: CPT | Performed by: SURGERY

## 2023-08-05 PROCEDURE — 85027 COMPLETE CBC AUTOMATED: CPT | Performed by: HOSPITALIST

## 2023-08-05 PROCEDURE — 25010000002 CEFAZOLIN IN DEXTROSE 2-4 GM/100ML-% SOLUTION: Performed by: SURGERY

## 2023-08-05 PROCEDURE — 94799 UNLISTED PULMONARY SVC/PX: CPT

## 2023-08-05 PROCEDURE — 99232 SBSQ HOSP IP/OBS MODERATE 35: CPT | Performed by: INTERNAL MEDICINE

## 2023-08-05 PROCEDURE — 63710000001 INSULIN GLARGINE PER 5 UNITS: Performed by: SURGERY

## 2023-08-05 PROCEDURE — 25010000002 ROPIVACAINE PER 1 MG: Performed by: ANESTHESIOLOGY

## 2023-08-05 PROCEDURE — 82948 REAGENT STRIP/BLOOD GLUCOSE: CPT

## 2023-08-05 RX ORDER — FENTANYL CITRATE 50 UG/ML
50 INJECTION, SOLUTION INTRAMUSCULAR; INTRAVENOUS ONCE AS NEEDED
Status: DISCONTINUED | OUTPATIENT
Start: 2023-08-05 | End: 2023-08-05 | Stop reason: HOSPADM

## 2023-08-05 RX ORDER — NALOXONE HCL 0.4 MG/ML
0.2 VIAL (ML) INJECTION AS NEEDED
Status: DISCONTINUED | OUTPATIENT
Start: 2023-08-05 | End: 2023-08-05

## 2023-08-05 RX ORDER — HYDROCODONE BITARTRATE AND ACETAMINOPHEN 7.5; 325 MG/1; MG/1
1 TABLET ORAL EVERY 4 HOURS PRN
Status: DISCONTINUED | OUTPATIENT
Start: 2023-08-05 | End: 2023-08-05

## 2023-08-05 RX ORDER — MIDAZOLAM HYDROCHLORIDE 1 MG/ML
0.5 INJECTION INTRAMUSCULAR; INTRAVENOUS
Status: DISCONTINUED | OUTPATIENT
Start: 2023-08-05 | End: 2023-08-05 | Stop reason: HOSPADM

## 2023-08-05 RX ORDER — FAMOTIDINE 10 MG/ML
20 INJECTION, SOLUTION INTRAVENOUS ONCE
Status: COMPLETED | OUTPATIENT
Start: 2023-08-05 | End: 2023-08-05

## 2023-08-05 RX ORDER — DOXYCYCLINE 100 MG/1
100 CAPSULE ORAL 2 TIMES DAILY WITH MEALS
Status: DISCONTINUED | OUTPATIENT
Start: 2023-08-05 | End: 2023-08-08 | Stop reason: HOSPADM

## 2023-08-05 RX ORDER — ROPIVACAINE HYDROCHLORIDE 5 MG/ML
INJECTION, SOLUTION EPIDURAL; INFILTRATION; PERINEURAL
Status: COMPLETED | OUTPATIENT
Start: 2023-08-05 | End: 2023-08-05

## 2023-08-05 RX ORDER — HYDRALAZINE HYDROCHLORIDE 20 MG/ML
5 INJECTION INTRAMUSCULAR; INTRAVENOUS
Status: DISCONTINUED | OUTPATIENT
Start: 2023-08-05 | End: 2023-08-05

## 2023-08-05 RX ORDER — OXYCODONE HYDROCHLORIDE 5 MG/1
5 TABLET ORAL EVERY 4 HOURS PRN
Status: DISCONTINUED | OUTPATIENT
Start: 2023-08-05 | End: 2023-08-08 | Stop reason: HOSPADM

## 2023-08-05 RX ORDER — SODIUM CHLORIDE 0.9 % (FLUSH) 0.9 %
3 SYRINGE (ML) INJECTION EVERY 12 HOURS SCHEDULED
Status: DISCONTINUED | OUTPATIENT
Start: 2023-08-05 | End: 2023-08-05 | Stop reason: HOSPADM

## 2023-08-05 RX ORDER — HYDROMORPHONE HYDROCHLORIDE 1 MG/ML
0.25 INJECTION, SOLUTION INTRAMUSCULAR; INTRAVENOUS; SUBCUTANEOUS
Status: DISCONTINUED | OUTPATIENT
Start: 2023-08-05 | End: 2023-08-05

## 2023-08-05 RX ORDER — ACETAMINOPHEN 500 MG
1000 TABLET ORAL EVERY 6 HOURS SCHEDULED
Status: DISCONTINUED | OUTPATIENT
Start: 2023-08-05 | End: 2023-08-08 | Stop reason: HOSPADM

## 2023-08-05 RX ORDER — DROPERIDOL 2.5 MG/ML
0.62 INJECTION, SOLUTION INTRAMUSCULAR; INTRAVENOUS
Status: DISCONTINUED | OUTPATIENT
Start: 2023-08-05 | End: 2023-08-05

## 2023-08-05 RX ORDER — FENTANYL CITRATE 50 UG/ML
25 INJECTION, SOLUTION INTRAMUSCULAR; INTRAVENOUS
Status: DISCONTINUED | OUTPATIENT
Start: 2023-08-05 | End: 2023-08-05

## 2023-08-05 RX ORDER — CEFAZOLIN SODIUM 2 G/100ML
2000 INJECTION, SOLUTION INTRAVENOUS ONCE
Status: COMPLETED | OUTPATIENT
Start: 2023-08-05 | End: 2023-08-05

## 2023-08-05 RX ORDER — SODIUM CHLORIDE, SODIUM LACTATE, POTASSIUM CHLORIDE, CALCIUM CHLORIDE 600; 310; 30; 20 MG/100ML; MG/100ML; MG/100ML; MG/100ML
9 INJECTION, SOLUTION INTRAVENOUS CONTINUOUS
Status: DISCONTINUED | OUTPATIENT
Start: 2023-08-05 | End: 2023-08-05

## 2023-08-05 RX ORDER — SODIUM CHLORIDE 0.9 % (FLUSH) 0.9 %
3-10 SYRINGE (ML) INJECTION AS NEEDED
Status: DISCONTINUED | OUTPATIENT
Start: 2023-08-05 | End: 2023-08-05 | Stop reason: HOSPADM

## 2023-08-05 RX ORDER — DIPHENHYDRAMINE HYDROCHLORIDE 50 MG/ML
12.5 INJECTION INTRAMUSCULAR; INTRAVENOUS
Status: DISCONTINUED | OUTPATIENT
Start: 2023-08-05 | End: 2023-08-05

## 2023-08-05 RX ORDER — LIDOCAINE HYDROCHLORIDE 20 MG/ML
INJECTION, SOLUTION INFILTRATION; PERINEURAL AS NEEDED
Status: DISCONTINUED | OUTPATIENT
Start: 2023-08-05 | End: 2023-08-05 | Stop reason: SURG

## 2023-08-05 RX ORDER — FLUMAZENIL 0.1 MG/ML
0.2 INJECTION INTRAVENOUS AS NEEDED
Status: DISCONTINUED | OUTPATIENT
Start: 2023-08-05 | End: 2023-08-05

## 2023-08-05 RX ORDER — IPRATROPIUM BROMIDE AND ALBUTEROL SULFATE 2.5; .5 MG/3ML; MG/3ML
3 SOLUTION RESPIRATORY (INHALATION) ONCE AS NEEDED
Status: DISCONTINUED | OUTPATIENT
Start: 2023-08-05 | End: 2023-08-05

## 2023-08-05 RX ORDER — PROMETHAZINE HYDROCHLORIDE 25 MG/1
25 TABLET ORAL ONCE AS NEEDED
Status: DISCONTINUED | OUTPATIENT
Start: 2023-08-05 | End: 2023-08-05

## 2023-08-05 RX ORDER — EPHEDRINE SULFATE 50 MG/ML
5 INJECTION, SOLUTION INTRAVENOUS ONCE AS NEEDED
Status: DISCONTINUED | OUTPATIENT
Start: 2023-08-05 | End: 2023-08-05

## 2023-08-05 RX ORDER — ONDANSETRON 2 MG/ML
4 INJECTION INTRAMUSCULAR; INTRAVENOUS ONCE AS NEEDED
Status: DISCONTINUED | OUTPATIENT
Start: 2023-08-05 | End: 2023-08-05

## 2023-08-05 RX ORDER — PROPOFOL 10 MG/ML
VIAL (ML) INTRAVENOUS AS NEEDED
Status: DISCONTINUED | OUTPATIENT
Start: 2023-08-05 | End: 2023-08-05 | Stop reason: SURG

## 2023-08-05 RX ORDER — LABETALOL HYDROCHLORIDE 5 MG/ML
5 INJECTION, SOLUTION INTRAVENOUS
Status: DISCONTINUED | OUTPATIENT
Start: 2023-08-05 | End: 2023-08-05

## 2023-08-05 RX ORDER — PROMETHAZINE HYDROCHLORIDE 25 MG/1
25 SUPPOSITORY RECTAL ONCE AS NEEDED
Status: DISCONTINUED | OUTPATIENT
Start: 2023-08-05 | End: 2023-08-05

## 2023-08-05 RX ORDER — HYDROCODONE BITARTRATE AND ACETAMINOPHEN 5; 325 MG/1; MG/1
1 TABLET ORAL ONCE AS NEEDED
Status: DISCONTINUED | OUTPATIENT
Start: 2023-08-05 | End: 2023-08-05

## 2023-08-05 RX ADMIN — Medication 10 ML: at 09:16

## 2023-08-05 RX ADMIN — SENNOSIDES AND DOCUSATE SODIUM 2 TABLET: 50; 8.6 TABLET ORAL at 09:16

## 2023-08-05 RX ADMIN — SODIUM CHLORIDE, POTASSIUM CHLORIDE, SODIUM LACTATE AND CALCIUM CHLORIDE 9 ML/HR: 600; 310; 30; 20 INJECTION, SOLUTION INTRAVENOUS at 11:28

## 2023-08-05 RX ADMIN — Medication 1 APPLICATION: at 09:16

## 2023-08-05 RX ADMIN — ROPIVACAINE HYDROCHLORIDE 15 ML: 5 INJECTION, SOLUTION EPIDURAL; INFILTRATION; PERINEURAL at 12:00

## 2023-08-05 RX ADMIN — ATORVASTATIN CALCIUM 10 MG: 20 TABLET, FILM COATED ORAL at 09:16

## 2023-08-05 RX ADMIN — PROPOFOL 30 MG: 10 INJECTION, EMULSION INTRAVENOUS at 13:08

## 2023-08-05 RX ADMIN — IPRATROPIUM BROMIDE AND ALBUTEROL SULFATE 3 ML: .5; 3 SOLUTION RESPIRATORY (INHALATION) at 08:25

## 2023-08-05 RX ADMIN — VANCOMYCIN HYDROCHLORIDE 750 MG: 750 INJECTION, POWDER, LYOPHILIZED, FOR SOLUTION INTRAVENOUS at 01:58

## 2023-08-05 RX ADMIN — INSULIN GLARGINE 8 UNITS: 100 INJECTION, SOLUTION SUBCUTANEOUS at 21:07

## 2023-08-05 RX ADMIN — PROPOFOL 30 MG: 10 INJECTION, EMULSION INTRAVENOUS at 12:55

## 2023-08-05 RX ADMIN — INSULIN LISPRO 2 UNITS: 100 INJECTION, SOLUTION INTRAVENOUS; SUBCUTANEOUS at 17:14

## 2023-08-05 RX ADMIN — FAMOTIDINE 20 MG: 10 INJECTION INTRAVENOUS at 11:29

## 2023-08-05 RX ADMIN — DOXYCYCLINE 100 MG: 100 CAPSULE ORAL at 17:14

## 2023-08-05 RX ADMIN — CEFAZOLIN SODIUM 2000 MG: 2 INJECTION, SOLUTION INTRAVENOUS at 12:41

## 2023-08-05 RX ADMIN — IPRATROPIUM BROMIDE AND ALBUTEROL SULFATE 3 ML: 2.5; .5 SOLUTION RESPIRATORY (INHALATION) at 07:00

## 2023-08-05 RX ADMIN — LIDOCAINE HYDROCHLORIDE 60 MG: 20 INJECTION, SOLUTION INFILTRATION; PERINEURAL at 12:55

## 2023-08-05 RX ADMIN — INSULIN LISPRO 2 UNITS: 100 INJECTION, SOLUTION INTRAVENOUS; SUBCUTANEOUS at 21:07

## 2023-08-05 RX ADMIN — ROPIVACAINE HYDROCHLORIDE 15 ML: 5 INJECTION EPIDURAL; INFILTRATION; PERINEURAL at 11:54

## 2023-08-05 NOTE — PROGRESS NOTES
ID NOTE    CC: f/u left 2nd toe osteomyelitis    Subj: No fever. Tolerating IV vancomycin. Going to OR today for L 2nd toe amputation.    Medications:    Current Facility-Administered Medications:     [MAR Hold] acetaminophen (TYLENOL) tablet 650 mg, 650 mg, Oral, Q4H PRN **OR** [MAR Hold] acetaminophen (TYLENOL) 160 MG/5ML solution 650 mg, 650 mg, Oral, Q4H PRN **OR** [MAR Hold] acetaminophen (TYLENOL) suppository 650 mg, 650 mg, Rectal, Q4H PRN, Jazzy Browning APRN    [MAR Hold] atorvastatin (LIPITOR) tablet 10 mg, 10 mg, Oral, Q48H, Jazzy Browning APRN, 10 mg at 08/05/23 0916    [MAR Hold] sennosides-docusate (PERICOLACE) 8.6-50 MG per tablet 2 tablet, 2 tablet, Oral, BID, 2 tablet at 08/05/23 0916 **AND** [MAR Hold] polyethylene glycol (MIRALAX) packet 17 g, 17 g, Oral, Daily PRN **AND** [MAR Hold] bisacodyl (DULCOLAX) EC tablet 5 mg, 5 mg, Oral, Daily PRN **AND** [MAR Hold] bisacodyl (DULCOLAX) suppository 10 mg, 10 mg, Rectal, Daily PRN, Jazzy Browning APRN    [MAR Hold] cadexomer iodine (IODOSORB) 0.9 % gel 1 application , 1 application , Topical, Daily, Buzz Gould MD, 1 application  at 08/05/23 0916    [MAR Hold] calcium carbonate (TUMS) chewable tablet 500 mg (200 mg elemental), 2 tablet, Oral, BID PRN, Jazzy Browning APRN    [MAR Hold] dextrose (D50W) (25 g/50 mL) IV injection 25 g, 25 g, Intravenous, Q15 Min PRN, Jazzy Browning APRN    [MAR Hold] dextrose (GLUTOSE) oral gel 15 g, 15 g, Oral, Q15 Min PRN, Jazzy Browning APRN    famotidine (PEPCID) injection 20 mg, 20 mg, Intravenous, Once, Juan Mejia MD    fentaNYL citrate (PF) (SUBLIMAZE) injection 50 mcg, 50 mcg, Intravenous, Once PRN, Juan Mejia MD    [MAR Hold] glucagon (GLUCAGEN) injection 1 mg, 1 mg, Intramuscular, Q15 Min PRN, Jazzy Browning APRN    [MAR Hold] insulin glargine (LANTUS, SEMGLEE) injection 8 Units, 8 Units, Subcutaneous, Nightly, Joey Dowd MD    [MAR  Hold] insulin lispro (HUMALOG/ADMELOG) injection 2-7 Units, 2-7 Units, Subcutaneous, 4x Daily AC & at Bedtime, Jazzy Browning APRN, 3 Units at 08/04/23 2131    [MAR Hold] ipratropium-albuterol (DUO-NEB) nebulizer solution 3 mL, 3 mL, Nebulization, Q8H - RT, Jazzy Browning APRN, 3 mL at 08/05/23 0700    [MAR Hold] ipratropium-albuterol (DUO-NEB) nebulizer solution 3 mL, 3 mL, Nebulization, Q4H PRN, Joey Dowd MD, 3 mL at 08/05/23 0825    lactated ringers infusion, 9 mL/hr, Intravenous, Continuous, Juan Mejia MD    midazolam (VERSED) injection 0.5 mg, 0.5 mg, Intravenous, Q5 Min PRN, Juan Mejia MD    [MAR Hold] ondansetron (ZOFRAN) tablet 4 mg, 4 mg, Oral, Q6H PRN **OR** [MAR Hold] ondansetron (ZOFRAN) injection 4 mg, 4 mg, Intravenous, Q6H PRN, Jazzy Browning APRN    Pharmacy to dose vancomycin, , Does not apply, Continuous PRN, Buzz Gould MD    [MAR Hold] sodium chloride 0.9 % flush 10 mL, 10 mL, Intravenous, Q12H, Jazzy Browning APRN, 10 mL at 08/05/23 0916    [MAR Hold] sodium chloride 0.9 % flush 10 mL, 10 mL, Intravenous, PRN, Jazzy Browning APRN    sodium chloride 0.9 % flush 3 mL, 3 mL, Intravenous, Q12H, Juan Mejia MD    sodium chloride 0.9 % flush 3-10 mL, 3-10 mL, Intravenous, PRN, Juan Mejia MD    [MAR Hold] sodium chloride 0.9 % infusion 40 mL, 40 mL, Intravenous, PRN, Jazzy Browning APRN    vancomycin 750 mg in sodium chloride 0.9 % 250 mL IVPB-VTB, 750 mg, Intravenous, Q12H, Dowd, Joey Loc, MD, Last Rate: 333.3 mL/hr at 08/05/23 0158, 750 mg at 08/05/23 0158      Objective   Vital Signs   Temp:  [97.5 øF (36.4 øC)-98.3 øF (36.8 øC)] 97.6 øF (36.4 øC)  Heart Rate:  [80-97] 83  Resp:  [16-20] 16  BP: (126-143)/(71-78) 142/74    Physical Exam:   General: NAD  Cardiovascular: NR  Respiratory: normal work of breathing   GI: Abdomen is not distended  :  no Nuñez catheter  MSK: thin musculature; L 2nd toe with  "wide but shallow ulcer; no surrounding erythema  Vasc: PIV w/o erythema    Labs:   CBC, BMP, VTr, and wound and blood cultures reviewed today  Lab Results   Component Value Date    WBC 6.56 08/05/2023    HGB 10.3 (L) 08/05/2023    HCT 31.9 (L) 08/05/2023    MCV 93.5 08/05/2023     08/05/2023     Lab Results   Component Value Date    GLUCOSE 156 (H) 08/05/2023    CALCIUM 8.6 08/05/2023     08/05/2023    K 3.8 08/05/2023    CO2 38.3 (H) 08/05/2023     08/05/2023    BUN 13 08/05/2023    CREATININE 0.32 (L) 08/05/2023    EGFRRESULT 84.9 05/22/2023    EGFR 101.9 08/05/2023    BCR 40.6 (H) 08/05/2023    ANIONGAP 3.7 (L) 08/05/2023     Lab Results   Component Value Date    ALT 17 08/02/2023     Lab Results   Component Value Date    CRP 2.32 (H) 08/02/2023     Lab Results   Component Value Date    HGBA1C 8.70 (H) 08/03/2023     Lab Results   Component Value Date    VANCORANDOM 5.70 08/04/2023     Procal 0.07    Microbiology:  8/2 BCx: negative to date  8/3 Left Toe Wound Cx: NGTD    Prior radiology:  MRI L Foot: \"Soft tissue wound/ulcer dorsal to the head of the second proximal phalanx with underlying osteomyelitis involving the distal shaft and head of the second proximal phalanx and septic arthritis of   the second PIP joint as well as cellulitis of the second toe.     EKG personally reviewed w/ QTc 483 ms    ASSESSMENT/PLAN:  Chronic osteomyelitis and septic arthritis (PIP joint) of left 2nd toe  Hammertoe  History of Mohs procedure  COPD  Chronic hypoxic respiratory failure (2L NC at home)  History of tobacco use    She remains afebrile with a normal WBC. Her blood and wound cultures are negative to date. She is going to the OR today for amputation of the left second toe. I will keep her on vancomycin today but starting tomorrow will probably switch her to doxycycline 100 mg PO BID x 5 days. Thanks to vascular surgery for their intervention to provide source control.     While the patient is on " vancomycin, vancomycin levels will be ordered and reviewed with dose adjustments made as needed. The patient will have a daily creatinine level checked while on vancomycin as part of monitoring this medication.     ID will follow.     ADDENDUM: Reviewed op note. S/P simple toe amputation. No gross purulence seen. D/w her RN. Will stop IV vancomycin and RX doxycycline 100 mg PO BID x 5 days. Thank you for allowing me to be involved in the care of this patient. Infectious diseases will sign off at this time with antibiotics plan in place, but please call me at [phone] if any further ID questions or new ID concerns.

## 2023-08-05 NOTE — PROGRESS NOTES
Name: Maite Pacheco ADMIT: 2023   : 1935  PCP: Rhys Thomas MD    MRN: [id number] LOS: 2 days   AGE/SEX: 88 y.o. female  ROOM: [facility]     Subjective   Subjective   No new complaint. No chest pain or shortness of breath.     Objective   Objective   Vital Signs  Temp:  [97.5 øF (36.4 øC)-98.3 øF (36.8 øC)] 97.6 øF (36.4 øC)  Heart Rate:  [80-97] 83  Resp:  [16-20] 16  BP: (126-143)/(71-78) 142/74  SpO2:  [86 %-96 %] 86 %  on  Flow (L/min):  [4-5] 4.5;   Device (Oxygen Therapy): nasal cannula  Body mass index is 14.68 kg/mý.    Physical Exam  Constitutional:       General: She is not in acute distress.     Appearance: Normal appearance. She is not toxic-appearing.   HENT:      Head: Normocephalic and atraumatic.   Eyes:      Extraocular Movements: Extraocular movements intact.   Cardiovascular:      Rate and Rhythm: Normal rate and regular rhythm.   Pulmonary:      Effort: Pulmonary effort is normal. No respiratory distress.      Breath sounds: Normal breath sounds.   Abdominal:      General: Bowel sounds are normal.      Palpations: Abdomen is soft.      Tenderness: There is no abdominal tenderness. There is no guarding or rebound.   Musculoskeletal:         General: No swelling.      Cervical back: Normal range of motion.   Skin:     General: Skin is warm and dry.      Comments: #2 left toe dressing   Neurological:      General: No focal deficit present.      Mental Status: She is alert and oriented to person, place, and time.   Psychiatric:         Mood and Affect: Mood normal.         Behavior: Behavior normal.         Thought Content: Thought content normal.     Results Review  I reviewed the patient's new clinical results.  Results from last 7 days   Lab Units 23  0548 238 23  1721   WBC 10*3/mm3 6.56 5.43 6.79   HEMOGLOBIN g/dL 10.3* 10.2* 11.3*   PLATELETS 10*3/mm3 186 196 229       Results from last 7 days   Lab Units 23  0548 23  2303 23  08/02/23  1721   SODIUM mmol/L 142 145 144 142   POTASSIUM mmol/L 3.8 4.3 4.1 4.3   CHLORIDE mmol/L 100 103 100 99   CO2 mmol/L 38.3* 36.6* 35.1* 39.0*   BUN mg/dL 13 18 23 33*   CREATININE mg/dL 0.32* 0.46* 0.43* 0.46*   GLUCOSE mg/dL 156* 154* 192* 330*       Lab Results   Component Value Date    ANIONGAP 3.7 (L) 08/05/2023     Estimated Creatinine Clearance: 72.5 mL/min (A) (by C-G formula based on SCr of 0.32 mg/dL (L)).    Results from last 7 days   Lab Units 08/02/23  1721   ALBUMIN g/dL 3.8   BILIRUBIN mg/dL 0.2   ALK PHOS U/L 82   AST (SGOT) U/L 18   ALT (SGPT) U/L 17       Results from last 7 days   Lab Units 08/05/23  0548 08/03/23  2303 08/03/23 0218 08/02/23  1721   CALCIUM mg/dL 8.6 8.8 8.7 9.6   ALBUMIN g/dL  --   --   --  3.8       Results from last 7 days   Lab Units 08/03/23  0218 08/02/23  2331 08/02/23 2027 08/02/23  1721   PROCALCITONIN ng/mL  --   --   --  0.07   LACTATE mmol/L 1.3 3.1* 2.3*  --        Hemoglobin A1C   Date/Time Value Ref Range Status   08/03/2023 0218 8.70 (H) 4.80 - 5.60 % Final     Glucose   Date/Time Value Ref Range Status   08/05/2023 0750 148 (H) 70 - 130 mg/dL Final     Comment:     Meter: [id number] : [id number] Reno Rosa Maria NA   08/04/2023 2056 239 (H) 70 - 130 mg/dL Final     Comment:     Meter: [id number] : [id number] Suzie Coe ALONSOA   08/04/2023 1654 216 (H) 70 - 130 mg/dL Final     Comment:     Meter: [id number] : [id number] Stephen Ramírez   08/04/2023 1223 272 (H) 70 - 130 mg/dL Final     Comment:     Meter: [id number] : THERESA Santizo RN   08/04/2023 0928 240 (H) 70 - 130 mg/dL Final     Comment:     Meter: [id number] : THERESA Santizo RN   08/03/2023 2059 264 (H) 70 - 130 mg/dL Final     Comment:     Meter: [id number] : [id number] Muna Dillon CNA   08/03/2023 1646 208 (H) 70 - 130 mg/dL Final     Comment:     Meter: [id number] : [id number] Andrei Chavez CNA       XR Chest 1 View    Result  Date: 8/4/2023  Minimal pleural effusions, with mild prominence of interstitial markings.  This report was finalized on 8/4/2023 3:52 PM by Dr. Jagjit Gomes M.D.      MRI Foot Left With & Without Contrast    Result Date: 8/3/2023  Soft tissue wound/ulcer dorsal to the head of the second proximal phalanx with underlying osteomyelitis involving the distal shaft and head of the second proximal phalanx and septic arthritis of the second PIP joint as well as cellulitis of the second toe.  This report was finalized on 8/3/2023 2:23 PM by Dr. Humza Lai M.D.       Scheduled Meds  atorvastatin, 10 mg, Oral, Q48H  cadexomer iodine, 1 application , Topical, Daily  insulin lispro, 2-7 Units, Subcutaneous, 4x Daily AC & at Bedtime  ipratropium-albuterol, 3 mL, Nebulization, Q8H - RT  senna-docusate sodium, 2 tablet, Oral, BID  sodium chloride, 10 mL, Intravenous, Q12H  vancomycin, 750 mg, Intravenous, Q12H    Continuous Infusions  Pharmacy to dose vancomycin,     PRN Meds    acetaminophen **OR** acetaminophen **OR** acetaminophen    senna-docusate sodium **AND** polyethylene glycol **AND** bisacodyl **AND** bisacodyl    calcium carbonate    dextrose    dextrose    glucagon (human recombinant)    ipratropium-albuterol    ondansetron **OR** ondansetron    Pharmacy to dose vancomycin    sodium chloride    sodium chloride    Pharmacy to dose vancomycin,     Diet  NPO Diet NPO Type: Sips with Meds    I have personally reviewed:  [x]  Medications  [x]  Laboratory   []  Microbiology   []  Radiology   []  EKG/Telemetry   []  Cardiology/Vascular   []  Pathology   []  Records      Assessment/Plan     Active Hospital Problems    Diagnosis  POA    **Cellulitis, toe [L03.039]  Yes    Severe malnutrition [E43]  Yes    Chronic respiratory failure with hypoxia [J96.11]  Unknown    Osteomyelitis of second toe of left foot [M86.9]  Unknown    COPD (chronic obstructive pulmonary disease) [J44.9]  Yes    Pulmonary hypertension [I27.20]   Yes    Type 2 diabetes mellitus [E11.9]  Yes    Hyperlipidemia [E78.5]  Yes      Resolved Hospital Problems   No resolved problems to display.     88 y.o. female left toe ulcer    Left #2 toe cellulitis/osteomyelitis  -Continue antibiotics, wound care  -Amputation today  -Antibiotics per ID    COPD  Chronic respiratory failure  Smoker  -Flow (L/min):  [4-5] 4.5     Diabetes mellitus type 2  -A1c 8.70%  -Continue correctional insulin.   -Add a small dose of long-acting insulin    Hyperlipidemia  -Atorvastatin    SCDs for DVT prophylaxis    Discussed with patient and nursing staff    Discharge: SAMM Dowd MD  [facility]  08/05/23

## 2023-08-05 NOTE — PLAN OF CARE
Goal Outcome Evaluation:  Plan of Care Reviewed With: patient        Progress: no change  Outcome Evaluation: Pt ambulating to BR with assist x1 and walker.  No complaints of pain this shift.  IV antibiotics given as ordered. VSS.  NPO at midnight.

## 2023-08-05 NOTE — PLAN OF CARE
Goal Outcome Evaluation:  Problem: Fall Injury Risk  Goal: Absence of Fall and Fall-Related Injury  Outcome: Ongoing, Progressing  Intervention: Identify and Manage Contributors  Recent Flowsheet Documentation  Taken 8/5/2023 1000 by Rosa Maria Santizo RN  Medication Review/Management: medications reviewed  Taken 8/5/2023 0800 by Rosa Maria Santizo RN  Medication Review/Management: medications reviewed  Intervention: Promote Injury-Free Environment  Recent Flowsheet Documentation  Taken 8/5/2023 1200 by Rosa Maria Santizo RN  Safety Promotion/Fall Prevention: patient off unit  Taken 8/5/2023 1000 by Rosa Maria Santizo RN  Safety Promotion/Fall Prevention:   assistive device/personal items within reach   clutter free environment maintained   fall prevention program maintained   nonskid shoes/slippers when out of bed   safety round/check completed   room organization consistent   lighting adjusted  Taken 8/5/2023 0800 by Rosa Maria Santizo RN  Safety Promotion/Fall Prevention:   assistive device/personal items within reach   clutter free environment maintained   fall prevention program maintained   lighting adjusted   nonskid shoes/slippers when out of bed   room organization consistent   safety round/check completed     Problem: COPD (Chronic Obstructive Pulmonary Disease) Comorbidity  Goal: Maintenance of COPD Symptom Control  Outcome: Ongoing, Progressing  Intervention: Maintain COPD-Symptom Control  Recent Flowsheet Documentation  Taken 8/5/2023 1000 by Rosa Maria Santizo RN  Medication Review/Management: medications reviewed  Taken 8/5/2023 0800 by Rosa Maria Santizo RN  Medication Review/Management: medications reviewed     Problem: Skin Injury Risk Increased  Goal: Skin Health and Integrity  Outcome: Ongoing, Progressing  Intervention: Optimize Skin Protection  Recent Flowsheet Documentation  Taken 8/5/2023 1000 by Rosa Maria Santizo RN  Head of Bed (HOB) Positioning: HOB at 20-30 degrees  Taken 8/5/2023 0800 by  Rosa Maria Santizo RN  Head of Bed (HOB) Positioning: HOB at 20-30 degrees     Problem: Adult Inpatient Plan of Care  Goal: Plan of Care Review  Outcome: Ongoing, Progressing  Goal: Patient-Specific Goal (Individualized)  Outcome: Ongoing, Progressing  Goal: Absence of Hospital-Acquired Illness or Injury  Outcome: Ongoing, Progressing  Intervention: Identify and Manage Fall Risk  Recent Flowsheet Documentation  Taken 8/5/2023 1200 by Rosa Maria Santizo RN  Safety Promotion/Fall Prevention: patient off unit  Taken 8/5/2023 1000 by Rosa Maria Santizo RN  Safety Promotion/Fall Prevention:   assistive device/personal items within reach   clutter free environment maintained   fall prevention program maintained   nonskid shoes/slippers when out of bed   safety round/check completed   room organization consistent   lighting adjusted  Taken 8/5/2023 0800 by Rosa Maria Santizo RN  Safety Promotion/Fall Prevention:   assistive device/personal items within reach   clutter free environment maintained   fall prevention program maintained   lighting adjusted   nonskid shoes/slippers when out of bed   room organization consistent   safety round/check completed  Intervention: Prevent Skin Injury  Recent Flowsheet Documentation  Taken 8/5/2023 1000 by Rosa Maria Santizo RN  Body Position:   position changed independently   30 degrees  Taken 8/5/2023 0800 by Rosa Maria Santizo RN  Body Position:   position changed independently   30 degrees  Intervention: Prevent and Manage VTE (Venous Thromboembolism) Risk  Recent Flowsheet Documentation  Taken 8/5/2023 0800 by Rosa Maria Santizo RN  VTE Prevention/Management:   bilateral   sequential compression devices on  Intervention: Prevent Infection  Recent Flowsheet Documentation  Taken 8/5/2023 0800 by Rosa Maria Santizo RN  Infection Prevention:   environmental surveillance performed   equipment surfaces disinfected   hand hygiene promoted   rest/sleep promoted   single patient room  provided  Goal: Optimal Comfort and Wellbeing  Outcome: Ongoing, Progressing  Intervention: Provide Person-Centered Care  Recent Flowsheet Documentation  Taken 8/5/2023 0800 by Rosa Maria Santizo RN  Trust Relationship/Rapport:   care explained   choices provided   emotional support provided   empathic listening provided   questions answered   questions encouraged   reassurance provided   thoughts/feelings acknowledged  Goal: Readiness for Transition of Care  Outcome: Ongoing, Progressing

## 2023-08-05 NOTE — OP NOTE
Operative Note  Date of Admission:  2023  OR Date: 2023  Location: [facility]    Pre-op Diagnosis: Osteomyelitis left second toe    Post-op Diagnosis: Same    Procedure:   1) left second toe amputation    Codin (1 times) Amputation, toe; interphalangeal joint (simple toe amputation, no resection of met head)    Surgeon: Best Wheeler MD    Assistant: None    Anesthesia: Monitored Anesthesia Care with [facility]    Staff:   Circulator: Liborio Reid RN  Scrub Person: Mónica Franks    Estimated Blood Loss: minimal    Specimen:   Order Name Source Comment Collection Info Order Time   TISSUE PATHOLOGY EXAM Toe, Left  Collected By: Best Wheeler MD 2023  1:32 PM     Release to patient   Routine Release            Complications: None    Findings: No gross purulence.  Good bleeding of the soft tissues including digital arteries.  Deep layer closed with a single figure-of-eight and then 3 interrupted mattress Prolene sutures to close the wound    Implants: Nothing was implanted during the procedure    Indications:    The patient is an 88 y.o. female seen for evaluation septic arthritis/osteomyelitis of the left second digit.       Procedure:    [place] incision was drawn at the base of the toe.  The skin and subcutaneous tissues were divided sharply and then digital bleeding was controlled Bovie electrocautery.  The proximal phalanx was dissected out circumferentially and then transected with a bone cutter.  Rongeur was used to smooth this out.  There was good bleeding from the tissues.  No gross purulence was encountered.  The wound was copiously irrigated and then I reapproximated tendon over the toe with interrupted figure-of-eight 3-0 nylon.  Skin was reapproximated with interrupted mattressed 2-0 Prolene's.  Sterile dressing applied.  Patient tolerated the seizure well.    Active Hospital Problems    Diagnosis  POA    **Cellulitis, toe [L03.039]  Yes    Severe  malnutrition [E43]  Yes    Chronic respiratory failure with hypoxia [J96.11]  Unknown    Osteomyelitis of second toe of left foot [M86.9]  Unknown    COPD (chronic obstructive pulmonary disease) [J44.9]  Yes    Pulmonary hypertension [I27.20]  Yes    Type 2 diabetes mellitus [E11.9]  Yes    Hyperlipidemia [E78.5]  Yes      Resolved Hospital Problems   No resolved problems to display.      Best Wheeler MD     Date: 8/5/2023  Time: 13:42 EDT

## 2023-08-06 LAB
ANION GAP SERPL CALCULATED.3IONS-SCNC: 2 MMOL/L (ref 5–15)
BACTERIA SPEC AEROBE CULT: NORMAL
BUN SERPL-MCNC: 11 MG/DL (ref 8–23)
BUN/CREAT SERPL: 36.7 (ref 7–25)
CALCIUM SPEC-SCNC: 9.3 MG/DL (ref 8.6–10.5)
CHLORIDE SERPL-SCNC: 96 MMOL/L (ref 98–107)
CO2 SERPL-SCNC: 42 MMOL/L (ref 22–29)
CREAT SERPL-MCNC: 0.3 MG/DL (ref 0.57–1)
DEPRECATED RDW RBC AUTO: 38.2 FL (ref 37–54)
EGFRCR SERPLBLD CKD-EPI 2021: 103.5 ML/MIN/1.73
ERYTHROCYTE [DISTWIDTH] IN BLOOD BY AUTOMATED COUNT: 11.9 % (ref 12.3–15.4)
GLUCOSE BLDC GLUCOMTR-MCNC: 149 MG/DL (ref 70–130)
GLUCOSE BLDC GLUCOMTR-MCNC: 202 MG/DL (ref 70–130)
GLUCOSE BLDC GLUCOMTR-MCNC: 243 MG/DL (ref 70–130)
GLUCOSE BLDC GLUCOMTR-MCNC: 336 MG/DL (ref 70–130)
GLUCOSE SERPL-MCNC: 124 MG/DL (ref 65–99)
GRAM STN SPEC: NORMAL
HCT VFR BLD AUTO: 30.2 % (ref 34–46.6)
HGB BLD-MCNC: 10.2 G/DL (ref 12–15.9)
MCH RBC QN AUTO: 29.7 PG (ref 26.6–33)
MCHC RBC AUTO-ENTMCNC: 33.8 G/DL (ref 31.5–35.7)
MCV RBC AUTO: 88 FL (ref 79–97)
PLATELET # BLD AUTO: 199 10*3/MM3 (ref 140–450)
PMV BLD AUTO: 9 FL (ref 6–12)
POTASSIUM SERPL-SCNC: 3.5 MMOL/L (ref 3.5–5.2)
RBC # BLD AUTO: 3.43 10*6/MM3 (ref 3.77–5.28)
SODIUM SERPL-SCNC: 140 MMOL/L (ref 136–145)
WBC NRBC COR # BLD: 10.02 10*3/MM3 (ref 3.4–10.8)

## 2023-08-06 PROCEDURE — 63710000001 INSULIN LISPRO (HUMAN) PER 5 UNITS: Performed by: SURGERY

## 2023-08-06 PROCEDURE — 94799 UNLISTED PULMONARY SVC/PX: CPT

## 2023-08-06 PROCEDURE — 82948 REAGENT STRIP/BLOOD GLUCOSE: CPT

## 2023-08-06 PROCEDURE — 80048 BASIC METABOLIC PNL TOTAL CA: CPT | Performed by: SURGERY

## 2023-08-06 PROCEDURE — 94760 N-INVAS EAR/PLS OXIMETRY 1: CPT

## 2023-08-06 PROCEDURE — 63710000001 INSULIN GLARGINE PER 5 UNITS: Performed by: SURGERY

## 2023-08-06 PROCEDURE — 85027 COMPLETE CBC AUTOMATED: CPT | Performed by: SURGERY

## 2023-08-06 PROCEDURE — 94664 DEMO&/EVAL PT USE INHALER: CPT

## 2023-08-06 RX ADMIN — DOXYCYCLINE 100 MG: 100 CAPSULE ORAL at 17:11

## 2023-08-06 RX ADMIN — INSULIN LISPRO 3 UNITS: 100 INJECTION, SOLUTION INTRAVENOUS; SUBCUTANEOUS at 12:10

## 2023-08-06 RX ADMIN — ACETAMINOPHEN 1000 MG: 500 TABLET ORAL at 00:03

## 2023-08-06 RX ADMIN — INSULIN LISPRO 3 UNITS: 100 INJECTION, SOLUTION INTRAVENOUS; SUBCUTANEOUS at 17:11

## 2023-08-06 RX ADMIN — IPRATROPIUM BROMIDE AND ALBUTEROL SULFATE 3 ML: 2.5; .5 SOLUTION RESPIRATORY (INHALATION) at 07:44

## 2023-08-06 RX ADMIN — INSULIN LISPRO 5 UNITS: 100 INJECTION, SOLUTION INTRAVENOUS; SUBCUTANEOUS at 20:57

## 2023-08-06 RX ADMIN — Medication 10 ML: at 20:59

## 2023-08-06 RX ADMIN — IPRATROPIUM BROMIDE AND ALBUTEROL SULFATE 3 ML: 2.5; .5 SOLUTION RESPIRATORY (INHALATION) at 15:08

## 2023-08-06 RX ADMIN — Medication 10 ML: at 00:04

## 2023-08-06 RX ADMIN — Medication 10 ML: at 09:01

## 2023-08-06 RX ADMIN — Medication 1 APPLICATION: at 09:01

## 2023-08-06 RX ADMIN — DOXYCYCLINE 100 MG: 100 CAPSULE ORAL at 09:01

## 2023-08-06 RX ADMIN — INSULIN GLARGINE 8 UNITS: 100 INJECTION, SOLUTION SUBCUTANEOUS at 20:58

## 2023-08-06 NOTE — PROGRESS NOTES
Name: Matie Pacheco ADMIT: 2023   : 1935  PCP: Rhys Thomas MD    MRN: [id number] LOS: 3 days   AGE/SEX: 88 y.o. female  ROOM: [facility]     Subjective   Subjective   Denies any foot pain had left second toe amputation yesterday. Denies any shortness of breath says breathing is baseline     Objective   Objective   Vital Signs  Temp:  [96.4 øF (35.8 øC)-97.6 øF (36.4 øC)] 97 øF (36.1 øC)  Heart Rate:  [78-96] 96  Resp:  [16-20] 20  BP: (111-150)/(60-79) 111/61  SpO2:  [89 %-95 %] 91 %  on  Flow (L/min):  [3-5] 4;   Device (Oxygen Therapy): humidified;nasal cannula  Body mass index is 14.68 kg/mý.    Physical Exam  Constitutional:       General: She is not in acute distress.     Appearance: Normal appearance. She is not toxic-appearing.   HENT:      Head: Normocephalic and atraumatic.   Eyes:      Extraocular Movements: Extraocular movements intact.   Cardiovascular:      Rate and Rhythm: Normal rate and regular rhythm.   Pulmonary:      Effort: Pulmonary effort is normal. No respiratory distress.      Breath sounds: Decreased breath sounds present.   Abdominal:      General: Bowel sounds are normal.      Palpations: Abdomen is soft.      Tenderness: There is no abdominal tenderness. There is no guarding or rebound.   Musculoskeletal:         General: No swelling.      Cervical back: Normal range of motion.   Skin:     General: Skin is warm and dry.      Comments: Left foot dressing   Neurological:      General: No focal deficit present.      Mental Status: She is alert and oriented to person, place, and time.   Psychiatric:         Mood and Affect: Mood normal.         Behavior: Behavior normal.         Thought Content: Thought content normal.     Results Review  I reviewed the patient's new clinical results.  Results from last 7 days   Lab Units 23  0451 23  0548 23  0218 23  1721   WBC 10*3/mm3 10.02 6.56 5.43 6.79   HEMOGLOBIN g/dL 10.2* 10.3* 10.2* 11.3*   PLATELETS  10*3/mm3 199 186 196 229       Results from last 7 days   Lab Units 08/06/23  0451 08/05/23  0548 08/03/23  2303 08/03/23  0218   SODIUM mmol/L 140 142 145 144   POTASSIUM mmol/L 3.5 3.8 4.3 4.1   CHLORIDE mmol/L 96* 100 103 100   CO2 mmol/L 42.0* 38.3* 36.6* 35.1*   BUN mg/dL 11 13 18 23   CREATININE mg/dL 0.30* 0.32* 0.46* 0.43*   GLUCOSE mg/dL 124* 156* 154* 192*       Lab Results   Component Value Date    ANIONGAP 2.0 (L) 08/06/2023     Estimated Creatinine Clearance: 77.4 mL/min (A) (by C-G formula based on SCr of 0.3 mg/dL (L)).    Results from last 7 days   Lab Units 08/02/23  1721   ALBUMIN g/dL 3.8   BILIRUBIN mg/dL 0.2   ALK PHOS U/L 82   AST (SGOT) U/L 18   ALT (SGPT) U/L 17       Results from last 7 days   Lab Units 08/06/23  0451 08/05/23  0548 08/03/23 2303 08/03/23 0218 08/02/23  1721   CALCIUM mg/dL 9.3 8.6 8.8 8.7 9.6   ALBUMIN g/dL  --   --   --   --  3.8       Results from last 7 days   Lab Units 08/03/23 0218 08/02/23  2331 08/02/23 2027 08/02/23  1721   PROCALCITONIN ng/mL  --   --   --  0.07   LACTATE mmol/L 1.3 3.1* 2.3*  --        Glucose   Date/Time Value Ref Range Status   08/06/2023 1153 243 (H) 70 - 130 mg/dL Final     Comment:     Meter: [id number] : [id number] Rowdy Crane NA   08/06/2023 0811 149 (H) 70 - 130 mg/dL Final     Comment:     Meter: [id number] : [id number] Rowdy Crane NA   08/05/2023 2030 191 (H) 70 - 130 mg/dL Final     Comment:     Meter: [id number] : [id number] Yehuda Lopez NA   08/05/2023 1645 195 (H) 70 - 130 mg/dL Final     Comment:     Meter: [id number] : [id number] Rowdy Crane GAIL   08/05/2023 1348 173 (H) 70 - 130 mg/dL Final     Comment:     Meter: [id number] : [id number] Janett Beauchamp RN   08/05/2023 0750 148 (H) 70 - 130 mg/dL Final     Comment:     Meter: [id number] : [id number] Rowdy Crane GAIL   08/04/2023 2056 239 (H) 70 - 130 mg/dL Final     Comment:     Meter: [id number] : [id number] Suzie Coe CNA       XR Chest  1 View    Result Date: 8/4/2023  Minimal pleural effusions, with mild prominence of interstitial markings.  This report was finalized on 8/4/2023 3:52 PM by Dr. Jagjit Gomes M.D.       Scheduled Meds  acetaminophen, 1,000 mg, Oral, Q6H  atorvastatin, 10 mg, Oral, Q48H  cadexomer iodine, 1 application , Topical, Daily  doxycycline, 100 mg, Oral, BID With Meals  insulin glargine, 8 Units, Subcutaneous, Nightly  insulin lispro, 2-7 Units, Subcutaneous, 4x Daily AC & at Bedtime  ipratropium-albuterol, 3 mL, Nebulization, Q8H - RT  senna-docusate sodium, 2 tablet, Oral, BID  sodium chloride, 10 mL, Intravenous, Q12H    Continuous Infusions     PRN Meds    acetaminophen **OR** acetaminophen **OR** acetaminophen    senna-docusate sodium **AND** polyethylene glycol **AND** bisacodyl **AND** bisacodyl    calcium carbonate    dextrose    dextrose    glucagon (human recombinant)    ipratropium-albuterol    ondansetron **OR** ondansetron    oxyCODONE    sodium chloride    sodium chloride       Diet  Diet: Regular/House Diet, Diabetic Diets; Consistent Carbohydrate; Texture: Regular Texture (IDDSI 7); Fluid Consistency: Thin (IDDSI 0)    I have personally reviewed:  [x]  Medications  [x]  Laboratory   []  Microbiology   []  Radiology   []  EKG/Telemetry   []  Cardiology/Vascular   []  Pathology   []  Records      Assessment/Plan     Active Hospital Problems    Diagnosis  POA    **Cellulitis, toe [L03.039]  Yes    Severe malnutrition [E43]  Yes    Chronic respiratory failure with hypoxia [J96.11]  Unknown    Osteomyelitis of second toe of left foot [M86.9]  Unknown    COPD (chronic obstructive pulmonary disease) [J44.9]  Yes    Pulmonary hypertension [I27.20]  Yes    Type 2 diabetes mellitus [E11.9]  Yes    Hyperlipidemia [E78.5]  Yes      Resolved Hospital Problems   No resolved problems to display.     88 y.o. female left toe ulcer    Left #2 toe cellulitis/osteomyelitis  -Continue antibiotics, wound care  -Amputation  8/5/2023  -Antibiotics per ID switching to p.o. doxycycline    COPD  Chronic respiratory failure  Smoker  -Flow (L/min):  [3-5] 4   -No new respiratory symptoms    Diabetes mellitus type 2  -A1c 8.70%  -Continue correctional insulin and small dose of long-acting insulin    Hyperlipidemia  -Atorvastatin    SCDs for DVT prophylaxis    Discussed with patient and nursing staff    Discharge: home maybe tomorrow see how she does with PT. she does not want to go to SNF.    Joey Dowd MD  [facility]  08/06/23

## 2023-08-06 NOTE — PLAN OF CARE
Goal Outcome Evaluation:        Problem: Fall Injury Risk  Goal: Absence of Fall and Fall-Related Injury  Outcome: Ongoing, Progressing  Intervention: Identify and Manage Contributors  Recent Flowsheet Documentation  Taken 8/6/2023 1200 by Rosa Maria Santizo RN  Medication Review/Management: medications reviewed  Taken 8/6/2023 1000 by Rosa Maria Santizo RN  Medication Review/Management: medications reviewed  Taken 8/6/2023 0800 by Rosa Maria Santizo RN  Medication Review/Management: medications reviewed  Intervention: Promote Injury-Free Environment  Recent Flowsheet Documentation  Taken 8/6/2023 1200 by Rosa Maria Santizo RN  Safety Promotion/Fall Prevention:   assistive device/personal items within reach   clutter free environment maintained   fall prevention program maintained   lighting adjusted   nonskid shoes/slippers when out of bed   room organization consistent   safety round/check completed  Taken 8/6/2023 1000 by Rosa Maria Santizo RN  Safety Promotion/Fall Prevention:   assistive device/personal items within reach   clutter free environment maintained   fall prevention program maintained   lighting adjusted   nonskid shoes/slippers when out of bed   safety round/check completed   room organization consistent  Taken 8/6/2023 0800 by Rosa Maria Santizo RN  Safety Promotion/Fall Prevention:   assistive device/personal items within reach   clutter free environment maintained   fall prevention program maintained   lighting adjusted   nonskid shoes/slippers when out of bed   room organization consistent   safety round/check completed     Problem: COPD (Chronic Obstructive Pulmonary Disease) Comorbidity  Goal: Maintenance of COPD Symptom Control  Outcome: Ongoing, Progressing  Intervention: Maintain COPD-Symptom Control  Recent Flowsheet Documentation  Taken 8/6/2023 1200 by Rosa Maria Santizo RN  Medication Review/Management: medications reviewed  Taken 8/6/2023 1000 by Rosa Maria Santizo RN  Medication  Review/Management: medications reviewed  Taken 8/6/2023 0800 by Rosa Maria Santizo RN  Medication Review/Management: medications reviewed     Problem: Skin Injury Risk Increased  Goal: Skin Health and Integrity  Outcome: Ongoing, Progressing  Intervention: Optimize Skin Protection  Recent Flowsheet Documentation  Taken 8/6/2023 1200 by Rosa Maria Santizo RN  Head of Bed (HOB) Positioning: HOB at 20-30 degrees  Taken 8/6/2023 1000 by Rosa Maria Santizo RN  Head of Bed (HOB) Positioning: HOB at 30-45 degrees  Taken 8/6/2023 0800 by Rosa Maria Santizo RN  Head of Bed (HOB) Positioning: HOB at 20-30 degrees     Problem: Adult Inpatient Plan of Care  Goal: Plan of Care Review  Outcome: Ongoing, Progressing  Goal: Patient-Specific Goal (Individualized)  Outcome: Ongoing, Progressing  Goal: Absence of Hospital-Acquired Illness or Injury  Outcome: Ongoing, Progressing  Intervention: Identify and Manage Fall Risk  Recent Flowsheet Documentation  Taken 8/6/2023 1200 by Rosa Maria Santizo RN  Safety Promotion/Fall Prevention:   assistive device/personal items within reach   clutter free environment maintained   fall prevention program maintained   lighting adjusted   nonskid shoes/slippers when out of bed   room organization consistent   safety round/check completed  Taken 8/6/2023 1000 by Rosa Maria Santizo RN  Safety Promotion/Fall Prevention:   assistive device/personal items within reach   clutter free environment maintained   fall prevention program maintained   lighting adjusted   nonskid shoes/slippers when out of bed   safety round/check completed   room organization consistent  Taken 8/6/2023 0800 by Rosa Maria Santizo RN  Safety Promotion/Fall Prevention:   assistive device/personal items within reach   clutter free environment maintained   fall prevention program maintained   lighting adjusted   nonskid shoes/slippers when out of bed   room organization consistent   safety round/check completed  Intervention: Prevent Skin  Injury  Recent Flowsheet Documentation  Taken 8/6/2023 1200 by Rosa Maria Santizo RN  Body Position:   position changed independently   30 degrees  Taken 8/6/2023 1000 by Rosa Maria Santizo RN  Body Position:   position changed independently   30 degrees  Taken 8/6/2023 0800 by Rosa Maria Santizo RN  Body Position:   30 degrees   position changed independently  Intervention: Prevent Infection  Recent Flowsheet Documentation  Taken 8/6/2023 0800 by Rosa Maria Santizo RN  Infection Prevention:   environmental surveillance performed   equipment surfaces disinfected   hand hygiene promoted   rest/sleep promoted   single patient room provided  Goal: Optimal Comfort and Wellbeing  Outcome: Ongoing, Progressing  Intervention: Provide Person-Centered Care  Recent Flowsheet Documentation  Taken 8/6/2023 0800 by Rosa Maria Santizo RN  Trust Relationship/Rapport:   care explained   choices provided   emotional support provided   empathic listening provided   questions answered   questions encouraged   reassurance provided   thoughts/feelings acknowledged  Goal: Readiness for Transition of Care  Outcome: Ongoing, Progressing

## 2023-08-06 NOTE — PROGRESS NOTES
Name: Maite Pacheco ADMIT: 2023   : 1935  PCP: Rhys Thomas MD    MRN: [id number] LOS: 3 days   AGE/SEX: 88 y.o. female  ROOM:  [place] [id number]/1     CC: Osteomyelitis second toe  Interval History: Tells me she had a rough day yesterday  Subjective   Subjective     Review of Systems  Objective   Objective     Vitals:   Temp:  [96.4 øF (35.8 øC)-97.7 øF (36.5 øC)] 97.6 øF (36.4 øC)  Heart Rate:  [78-91] 78  Resp:  [16-20] 20  BP: (113-157)/(60-84) 123/71    No intake/output data recorded.    Scheduled Meds:     acetaminophen, 1,000 mg, Oral, Q6H  atorvastatin, 10 mg, Oral, Q48H  cadexomer iodine, 1 application , Topical, Daily  doxycycline, 100 mg, Oral, BID With Meals  insulin glargine, 8 Units, Subcutaneous, Nightly  insulin lispro, 2-7 Units, Subcutaneous, 4x Daily AC & at Bedtime  ipratropium-albuterol, 3 mL, Nebulization, Q8H - RT  senna-docusate sodium, 2 tablet, Oral, BID  sodium chloride, 10 mL, Intravenous, Q12H      IV Meds:        Physical Exam  Vascular: Dressing dry    Data Reviewed:  CBC    Results from last 7 days   Lab Units 23  0451 23  0548 23  0218 23  1721   WBC 10*3/mm3 10.02 6.56 5.43 6.79   HEMOGLOBIN g/dL 10.2* 10.3* 10.2* 11.3*   PLATELETS 10*3/mm3 199 186 196 229     BMP   Results from last 7 days   Lab Units 23  0451 23  0548 23  2303 23  0218 23  1721   SODIUM mmol/L 140 142 145 144 142   POTASSIUM mmol/L 3.5 3.8 4.3 4.1 4.3   CHLORIDE mmol/L 96* 100 103 100 99   CO2 mmol/L 42.0* 38.3* 36.6* 35.1* 39.0*   BUN mg/dL 11 13 18 23 33*   CREATININE mg/dL 0.30* 0.32* 0.46* 0.43* 0.46*   GLUCOSE mg/dL 124* 156* 154* 192* 330*       Most notable findings include: White blood cell count of 10    Active Hospital Problems:   Active Hospital Problems    Diagnosis  POA    **Cellulitis, toe [L03.039]  Yes    Severe malnutrition [E43]  Yes    Chronic respiratory failure with hypoxia [J96.11]  Unknown    Osteomyelitis of second toe  of left foot [M86.9]  Unknown    COPD (chronic obstructive pulmonary disease) [J44.9]  Yes    Pulmonary hypertension [I27.20]  Yes    Type 2 diabetes mellitus [E11.9]  Yes    Hyperlipidemia [E78.5]  Yes      Resolved Hospital Problems   No resolved problems to display.      Assessment & Plan   [place], Subsequent Hospital Care  Assessment / Plan     POD#2 s/p transphalangeal toe amputation for osteomyelitis: We will get a Darco shoe to see if she can bear weight reasonly well.  I will plan to remove dressing tomorrow.  She is very hesitant to consider SNF but it seems like she will have a great deal of difficulty caring for self.  Heel Weightbearing as tolerated on the foot.    Best Wheeler MD  23  07:55 EDT  Office Number [phone]            `

## 2023-08-06 NOTE — PLAN OF CARE
Goal Outcome Evaluation:  Plan of Care Reviewed With: patient        Progress: no change  Outcome Evaluation: Pt pleasant and cooperative.  Tylenol given x 1 for foot pain.  Pt refused other scheduled tylenol.  Pt resting comfortably in bed.  Will continue to monitor.

## 2023-08-07 LAB
ANION GAP SERPL CALCULATED.3IONS-SCNC: 6.7 MMOL/L (ref 5–15)
BACTERIA SPEC AEROBE CULT: NORMAL
BACTERIA SPEC AEROBE CULT: NORMAL
BUN SERPL-MCNC: 17 MG/DL (ref 8–23)
BUN/CREAT SERPL: 43.6 (ref 7–25)
CALCIUM SPEC-SCNC: 9.1 MG/DL (ref 8.6–10.5)
CHLORIDE SERPL-SCNC: 97 MMOL/L (ref 98–107)
CO2 SERPL-SCNC: 38.3 MMOL/L (ref 22–29)
CREAT SERPL-MCNC: 0.39 MG/DL (ref 0.57–1)
DEPRECATED RDW RBC AUTO: 40.4 FL (ref 37–54)
EGFRCR SERPLBLD CKD-EPI 2021: 97.1 ML/MIN/1.73
ERYTHROCYTE [DISTWIDTH] IN BLOOD BY AUTOMATED COUNT: 12.3 % (ref 12.3–15.4)
GLUCOSE BLDC GLUCOMTR-MCNC: 106 MG/DL (ref 70–130)
GLUCOSE BLDC GLUCOMTR-MCNC: 231 MG/DL (ref 70–130)
GLUCOSE BLDC GLUCOMTR-MCNC: 265 MG/DL (ref 70–130)
GLUCOSE SERPL-MCNC: 199 MG/DL (ref 65–99)
HCT VFR BLD AUTO: 31.9 % (ref 34–46.6)
HGB BLD-MCNC: 10.5 G/DL (ref 12–15.9)
MCH RBC QN AUTO: 29.7 PG (ref 26.6–33)
MCHC RBC AUTO-ENTMCNC: 32.9 G/DL (ref 31.5–35.7)
MCV RBC AUTO: 90.1 FL (ref 79–97)
PLATELET # BLD AUTO: 213 10*3/MM3 (ref 140–450)
PMV BLD AUTO: 9 FL (ref 6–12)
POTASSIUM SERPL-SCNC: 3.4 MMOL/L (ref 3.5–5.2)
QT INTERVAL: 313 MS
RBC # BLD AUTO: 3.54 10*6/MM3 (ref 3.77–5.28)
SODIUM SERPL-SCNC: 142 MMOL/L (ref 136–145)
WBC NRBC COR # BLD: 10.92 10*3/MM3 (ref 3.4–10.8)

## 2023-08-07 PROCEDURE — 80048 BASIC METABOLIC PNL TOTAL CA: CPT | Performed by: SURGERY

## 2023-08-07 PROCEDURE — 93005 ELECTROCARDIOGRAM TRACING: CPT | Performed by: HOSPITALIST

## 2023-08-07 PROCEDURE — 94761 N-INVAS EAR/PLS OXIMETRY MLT: CPT

## 2023-08-07 PROCEDURE — 94799 UNLISTED PULMONARY SVC/PX: CPT

## 2023-08-07 PROCEDURE — 63710000001 INSULIN GLARGINE PER 5 UNITS: Performed by: HOSPITALIST

## 2023-08-07 PROCEDURE — 97530 THERAPEUTIC ACTIVITIES: CPT

## 2023-08-07 PROCEDURE — 85027 COMPLETE CBC AUTOMATED: CPT | Performed by: SURGERY

## 2023-08-07 PROCEDURE — 63710000001 INSULIN LISPRO (HUMAN) PER 5 UNITS: Performed by: SURGERY

## 2023-08-07 PROCEDURE — 82948 REAGENT STRIP/BLOOD GLUCOSE: CPT

## 2023-08-07 PROCEDURE — 94664 DEMO&/EVAL PT USE INHALER: CPT

## 2023-08-07 PROCEDURE — 97162 PT EVAL MOD COMPLEX 30 MIN: CPT

## 2023-08-07 PROCEDURE — 93010 ELECTROCARDIOGRAM REPORT: CPT | Performed by: INTERNAL MEDICINE

## 2023-08-07 RX ADMIN — Medication 10 ML: at 21:24

## 2023-08-07 RX ADMIN — INSULIN GLARGINE 10 UNITS: 100 INJECTION, SOLUTION SUBCUTANEOUS at 21:24

## 2023-08-07 RX ADMIN — SENNOSIDES AND DOCUSATE SODIUM 2 TABLET: 50; 8.6 TABLET ORAL at 09:45

## 2023-08-07 RX ADMIN — IPRATROPIUM BROMIDE AND ALBUTEROL SULFATE 3 ML: 2.5; .5 SOLUTION RESPIRATORY (INHALATION) at 21:07

## 2023-08-07 RX ADMIN — IPRATROPIUM BROMIDE AND ALBUTEROL SULFATE 3 ML: 2.5; .5 SOLUTION RESPIRATORY (INHALATION) at 07:15

## 2023-08-07 RX ADMIN — Medication 1 APPLICATION: at 09:45

## 2023-08-07 RX ADMIN — DOXYCYCLINE 100 MG: 100 CAPSULE ORAL at 09:45

## 2023-08-07 RX ADMIN — INSULIN LISPRO 4 UNITS: 100 INJECTION, SOLUTION INTRAVENOUS; SUBCUTANEOUS at 17:35

## 2023-08-07 RX ADMIN — INSULIN LISPRO 3 UNITS: 100 INJECTION, SOLUTION INTRAVENOUS; SUBCUTANEOUS at 12:50

## 2023-08-07 RX ADMIN — Medication 10 ML: at 09:55

## 2023-08-07 RX ADMIN — DOXYCYCLINE 100 MG: 100 CAPSULE ORAL at 21:23

## 2023-08-07 RX ADMIN — IPRATROPIUM BROMIDE AND ALBUTEROL SULFATE 3 ML: 2.5; .5 SOLUTION RESPIRATORY (INHALATION) at 15:19

## 2023-08-07 RX ADMIN — ATORVASTATIN CALCIUM 10 MG: 20 TABLET, FILM COATED ORAL at 10:00

## 2023-08-07 NOTE — PLAN OF CARE
Goal Outcome Evaluation:              Outcome Evaluation: No compliants of pain tylenol refused. PT expected to D/C 8/8/23 AM via EMS to skilled facility. Tachycardia occured mid-shift -150 and 12-lead EKg obtained. HR now 113  PT resting in bed no current respiratory distress or discomfort.

## 2023-08-07 NOTE — PROGRESS NOTES
Discharge Planning Assessment  [facility]     Patient Name: Maite Pacheco  MRN: [id number]  Today's Date: 8/7/2023    Admit Date: 8/2/2023    Plan: Home with [facility]   Discharge Needs Assessment    No documentation.                  Discharge Plan       Row Name 08/07/23 1708       Plan    Plan Comments The patient is agreeable with the discharge to [place] bed, by [facility]/EMS at 09:00 on 8/8/23. Her friend on the chart was notified of this also per the patient's request. Discharge packet started and along with the EMS/[facility] transportation form and face sheet. Discharge packet with the patient's chart. RN to call report in the AM. RAPHAEL Breaux RN, CCP                  Continued Care and Services - Admitted Since 8/2/2023       Destination Coordination complete.      Service Provider Request Status Selected Services Address Phone Fax Patient Preferred    [facility]  Selected Skilled Nursing [address] [address] [phone] --              Durable Medical Equipment       Service Provider Request Status Selected Services Address Phone Fax Patient Preferred    [facility] Pending - Request Sent N/A [address], [place] [facility] [id number] [phone] --              Home Medical Care       Service Provider Request Status Selected Services Address Phone Fax Patient Preferred    [facility] Accepted N/A [address], [address], [place] [address] [phone] --                  Expected Discharge Date and Time       Expected Discharge Date Expected Discharge Time    Aug 8, 2023            Demographic Summary    No documentation.                  Functional Status    No documentation.                  Psychosocial    No documentation.                  Abuse/Neglect    No documentation.                  Legal    No documentation.                  Substance Abuse    No  documentation.                  Patient Forms    No documentation.                     Latisha Breaux RN

## 2023-08-07 NOTE — PROGRESS NOTES
Discharge Planning Assessment  [facility]     Patient Name: Maite Pacheco  MRN: [id number]  Today's Date: 8/7/2023    Admit Date: 8/2/2023    Plan: Home with [facility]   Discharge Needs Assessment    No documentation.                  Discharge Plan       Row Name 08/07/23 1708       Plan    Plan Comments The patient is agreeable with the discharge to [place], [facility] bed, by [facility]/EMS at 09:00 on 8/8/23. Her friend on the chart was notified of this also per the patient's request. Discharge packet started and along with the EMS/[facility] transportation form and face sheet. Discharge packet with the patient's chart. RN to call report in the AM. RAPHAEL Breaux RN, CCP                  Continued Care and Services - Admitted Since 8/2/2023       Destination Coordination complete.      Service Provider Request Status Selected Services Address Phone Fax Patient Preferred    [facility]  Selected Skilled Nursing [address] [address] [phone] --              Durable Medical Equipment       Service Provider Request Status Selected Services Address Phone Fax Patient Preferred    [facility] Pending - Request Sent N/A [address], [facility] [id number] [phone] --              Home Medical Care       Service Provider Request Status Selected Services Address Phone Fax Patient Preferred    [facility] Accepted N/A [address], [address], [place] [address] [phone] --                  Expected Discharge Date and Time       Expected Discharge Date Expected Discharge Time    Aug 8, 2023            Demographic Summary    No documentation.                  Functional Status    No documentation.                  Psychosocial    No documentation.                  Abuse/Neglect    No documentation.                  Legal    No documentation.                  Substance Abuse    No  documentation.                  Patient Forms    No documentation.                     Latisha Breaux RN

## 2023-08-07 NOTE — PROGRESS NOTES
"DAILY PROGRESS NOTE  [facility]    Patient Identification:  Name: Maite Pacheco  Age: 88 y.o.  Sex: female  :  1935  MRN: [id number]         Primary Care Physician: Rhys Thomas MD      Subjective  Patient presently with no acute complaints.    Objective:  General Appearance:  Comfortable, in no acute distress and not in pain (Very thin and frail.).    Vital signs: (most recent): Blood pressure 119/70, pulse 98, temperature 97.2 øF (36.2 øC), temperature source Oral, resp. rate 20, height 157.5 cm (62\"), weight 36.4 kg (80 lb 4 oz), SpO2 92 %, not currently breastfeeding.    Lungs:  Normal effort and normal respiratory rate.    Heart: Normal rate.  Regular rhythm.    Extremities: There is no dependent edema.    Neurological: Patient is alert and oriented to person, place and time.    Skin:  Warm and dry.                Vital signs in last 24 hours:  Temp:  [97.2 øF (36.2 øC)-98.6 øF (37 øC)] 97.2 øF (36.2 øC)  Heart Rate:  [] 98  Resp:  [19-20] 20  BP: (113-121)/(60-70) 119/70    Intake/Output:    Intake/Output Summary (Last 24 hours) at 2023 1013  Last data filed at 2023 0908  Gross per 24 hour   Intake 600 ml   Output 500 ml   Net 100 ml         Results from last 7 days   Lab Units 23  0623  0451 23  0548 238 23  1721   WBC 10*3/mm3 10.92* 10.02 6.56 5.43 6.79   HEMOGLOBIN g/dL 10.5* 10.2* 10.3* 10.2* 11.3*   PLATELETS 10*3/mm3 213 199 186 196 229     Results from last 7 days   Lab Units 23  04523  0548 23  2303 23  0218 23  1721   SODIUM mmol/L 140 142 145 144 142   POTASSIUM mmol/L 3.5 3.8 4.3 4.1 4.3   CHLORIDE mmol/L 96* 100 103 100 99   CO2 mmol/L 42.0* 38.3* 36.6* 35.1* 39.0*   BUN mg/dL 11 13 18 23 33*   CREATININE mg/dL 0.30* 0.32* 0.46* 0.43* 0.46*   GLUCOSE mg/dL 124* 156* 154* 192* 330*   Estimated Creatinine Clearance: 77.4 mL/min (A) (by C-G formula based on SCr of 0.3 mg/dL (L)).  Results " from last 7 days   Lab Units 08/06/23  0451 08/05/23  0548 08/03/23  2303 08/03/23  0218 08/02/23  1721   CALCIUM mg/dL 9.3 8.6 8.8 8.7 9.6   ALBUMIN g/dL  --   --   --   --  3.8     Results from last 7 days   Lab Units 08/02/23  1721   ALBUMIN g/dL 3.8   BILIRUBIN mg/dL 0.2   ALK PHOS U/L 82   AST (SGOT) U/L 18   ALT (SGPT) U/L 17       Assessment:    Cellulitis, toe    Type 2 diabetes mellitus    Hyperlipidemia    Pulmonary hypertension    COPD (chronic obstructive pulmonary disease)    Chronic respiratory failure with hypoxia    Severe malnutrition    Osteomyelitis of second toe of left foot    Osteomyelitis-left second toe: Status post amputation 8/5/2023.  Vascular surgery and infectious disease input greatly appreciated.  Now on oral doxycycline.  Severe malnutrition:  If she ends up staying today we will request nutrition evaluation.  COPD/pulmonary hypertension/chronic respiratory failure: Stable.  Diabetes type 2: On insulin here.  Metformin prior to admission.  With the patient being severely underweight my preference would be to hold off on the metformin and see if we can treat this with insulin, at least for the time being.  Blood sugars presently running high.  Will increase Lantus.    All problems new to this examiner.    Plan:  Please see above.  Discharge planning.  Patient initially very insistent on going home.  After discussion she is willing to consider rehab.  She does live alone and I do have concerns about her ability to care for herself.  Discussed with patient and daughter at bedside.  Discussed with CCP.  Discussed with RN.    Liu Urbano MD  8/7/2023  10:13 EDT

## 2023-08-07 NOTE — PROGRESS NOTES
"Physicians Statement of Medical Necessity for  Ambulance Transportation    GENERAL INFORMATION     Name: Maite Pacheco  YOB: 1935  Medicare #: medicare A & B #[id number] and [facility] health care options #[id number]  Transport Date: 8/8/23 (Valid for round trips this date, or for scheduled repetitive trips for 60 days from the date signed below.)  Origin: [address] [phone]   Destination: [facility], [address] [place] [address] [phone]  Is the Patient's stay covered under Medicare Part A (PPS/DRG?)Yes  Closest appropriate facility? Yes  If this a hosp-hosp transfer? No  Is this a hospice patient? No    MEDICAL NECESSITY QUESTIONAIRE    Ambulance Transportation is medically necessary only if other means of transportation are contraindicated or would be potentially harmful to the patient.  To meet this requirement, the patient must be either \"bed confined\" or suffer from a condition such that transport by means other than an ambulance is contraindicated by the patient's condition.  The following questions must be answered by the healthcare professional signing below for this form to be valid:     1) Describe the MEDICAL CONDITION (physical and/or mental) of this patient AT THE TIME OF AMBULANCE TRANSPORT that requires the patient to be transported in an ambulance, and why transport by other means is contraindicated by the patient's condition: stable on oxygen  Past Medical History:   Diagnosis Date    Abnormal CT of the abdomen     Allergic rhinitis     Arthritis     Chronic pancreatitis     COPD (chronic obstructive pulmonary disease) 12/30/16    Diabetes mellitus     type 2    Eustachian tube dysfunction     History of pneumonia 12/2016    HL (hearing loss)     Hyperglycemia     Hyperlipidemia     Inguinal hernia     Neck pain     Osteopenia     Osteoporosis     Pancreatic cyst     Pneumonia 12/30/16    Urinary frequency     " "  Past Surgical History:   Procedure Laterality Date    APPENDECTOMY N/A 1943    BELOW KNEE AMPUTATION Left 8/5/2023    Procedure: Left second toe amputation;  Surgeon: Best Wheeler MD;  Location: [facility];  Service: Vascular;  Laterality: Left;    CARDIAC CATHETERIZATION N/A 10/25/2017    Procedure: Coronary angiography;  Surgeon: Gustavo Flores MD;  Location: [facility] CATH INVASIVE LOCATION;  Service:     CARDIAC CATHETERIZATION N/A 10/25/2017    Procedure: Left heart cath;  Surgeon: Gustavo Flores MD;  Location: [facility] CATH INVASIVE LOCATION;  Service:     CARDIAC CATHETERIZATION N/A 10/25/2017    Procedure: Left ventriculography;  Surgeon: Gustavo Flores MD;  Location: [facility] CATH INVASIVE LOCATION;  Service:     COLONOSCOPY N/A 03/31/2003    Colonoscopy to the cecum, terminal ileoscopy, normal-Dr. Zahraa Brito     FEMUR IM NAILING/RODDING Left 2/18/2023    Procedure: LEFT FEMUR INTRAMEDULLARY NAILING/RODDING;  Surgeon: Best Luna MD;  Location: [facility];  Service: Orthopedics;  Laterality: Left;    INGUINAL HERNIA REPAIR Left 01/18/2008    Open left inguinal hernia repair with mesh-Dr. Zahraa Brito       2) Is this patient \"bed confined\" as defined below?No    To be \"bed confined\" the patient must satisfy all three of the following criteria:  (1) unable to get up from bed without assistance; AND (2) unable to ambulate;  AND (3) unable to sit in a chair or wheelchair.  3) Can this patient safely be transported by car or wheelchair van (I.e., may safely sit during transport, without an attendant or monitoring?)Yes  4. In addition to completing questions 1-3 above, please check any of the following conditions that apply*:          *Note: supporting documentation for any boxes checked must be maintained in the patient's medical records Requires oxygen - unable to self administer      SIGNATURE OF PHYSICIAN OR OTHER AUTHORIZED HEALTHCARE PROFESSIONAL    I certify that " the above information is true and correct based on my evaluation of this patient, and represent that the patient requires transport by ambulance and that other forms of transport are contraindicated.  I understand that this information will be used by the Centers for Medicare and Medicaid Services (CMS) to support the determiniation of medical necessity for ambulance services, and I represent that I have personal knowledge of the patient's condition at the time of transport.     X   If this box is checked, I also certify that the patient is physically or mentally incapable of signing the ambulance service's claim form and that the institution with which I am affiliated has furnished care, services or assistance to the patient.  My signature below is made on behalf of the patient pursuant to 42 .36(b)(4). In accordance with 42 .37, the specific reason(s) that the patient is physically or mentally incapable of signing the claim for is as follows:      Signature of Physician or Healthcare Professional  Date/Time:   8/8/23 @ 09:00     (For Scheduled repetitive transport, this form is not valid for transports performed more than 60 days after this date).                                                                                                                                            --------------------------------------------------------------------------------------------  Printed Name and Credentials of Physician or Authorized Healthcare Professional     *Form must be signed by patient's attending physician for scheduled, repetitive transports,.  For non-repetitive ambulance transports, if unable to obtain the signature of the attending physician, any of the following may sign (please select below):     Physician  Clinical Nurse Specialist  X Registered Nurse     Physician Assistant  Discharge Planner  Licensed Practical Nurse     Nurse Practitioner

## 2023-08-07 NOTE — DISCHARGE PLACEMENT REQUEST
"Albert Sharif (88 y.o. Female)       Date of Birth   1935    Social Security Number       Address   [address] [place] [address]    Home Phone   [phone]    MRN   [id number]       Holiness   Scientologist    Marital Status   Single                            Admission Date   8/2/23    Admission Type   Emergency    Admitting Provider   Buzz Gould MD    Attending Provider   Liu Urbano MD    Department, Room/Bed   [address], [address]/1       Discharge Date       Discharge Disposition       Discharge Destination                                 Attending Provider: Liu Urbano MD    Allergies: Morphine, Sulfa Antibiotics    Isolation: None   Infection: None   Code Status: CPR    Ht: 157.5 cm (62\")   Wt: 36.4 kg (80 lb 4 oz)    Admission Cmt: None   Principal Problem: Cellulitis, toe [L03.039]                   Active Insurance as of 8/2/2023       Primary Coverage       Payor Plan Insurance Group Employer/Plan Group    MEDICARE MEDICARE A & B        Payor Plan Address Payor Plan Phone Number Payor Plan Fax Number Effective Dates    [address] [phone]  4/1/2002 - None Entered    formerly [facility] [id number]         Subscriber Name Subscriber Birth Date Member ID       ALBERT SHARIF 1935 [id number]               Secondary Coverage       Payor Plan Insurance Group Employer/Plan Group    AARP MC SUP AARP HEALTH CARE OPTIONS        Payor Plan Address Payor Plan Phone Number Payor Plan Fax Number Effective Dates    [facility] [phone]  1/1/2016 - None Entered    [address]       [facility] [address]         Subscriber Name Subscriber Birth Date Member ID       ALBERT SHARIF 1935 [id number]                     Emergency Contacts        (Rel.) Home Phone Work Phone Mobile Phone    Echo Sullivan (Friend) -- -- [phone]                "

## 2023-08-07 NOTE — PROGRESS NOTES
Discharge Planning Assessment  [facility]     Patient Name: Maite Pacheco  MRN: [id number]  Today's Date: 8/7/2023    Admit Date: 8/2/2023    Plan: Home with [facility]   Discharge Needs Assessment    No documentation.                  Discharge Plan       Row Name 08/07/23 1229       Plan    Plan Comments Spoke with [facility] and they can accept tomorrow to a skilled bed. [facility] set up for 09:00 to transport in the AM. Patient on 4 liters of NC. Will update RN and MD. RAPHAEL Breaux RN, CCP.      Row Name 08/07/23 9927       Plan    Plan Comments Spoke with the patient and she would like to go to [facility], skilled bed. Called [facility] and she will call me back and let me know if they can accept for tomorrow. Will need to set up transportation. RAPHAEL Breaux RN, CCP.                  Continued Care and Services - Admitted Since 8/2/2023       Destination Coordination complete.      Service Provider Request Status Selected Services Address Phone Fax Patient Preferred    [facility]  Selected Skilled Nursing [address] [facility] [address] [phone] --              Durable Medical Equipment       Service Provider Request Status Selected Services Address Phone Fax Patient Preferred    [facility] Pending - Request Sent N/A [address], [facility] [id number] [phone] --              Home Medical Care       Service Provider Request Status Selected Services Address Phone Fax Patient Preferred    [facility] Accepted N/A [address] [facility] [id number] [phone] --                  Expected Discharge Date and Time       Expected Discharge Date Expected Discharge Time    Aug 10, 2023            Demographic Summary    No documentation.                  Functional Status    No documentation.                  Psychosocial    No documentation.                   Abuse/Neglect    No documentation.                  Legal    No documentation.                  Substance Abuse    No documentation.                  Patient Forms    No documentation.                     Latisha Breaux RN

## 2023-08-07 NOTE — PLAN OF CARE
Goal Outcome Evaluation:  VSS. A&Ox4. No complaints overnight of pain. Pt refused scheduled tylenol and stool softners.  4L humidified o2. ACHS. External cath and brief remain in place. Pt assisted with turns for comfort. Care continuing.

## 2023-08-07 NOTE — PLAN OF CARE
Goal Outcome Evaluation:  Plan of Care Reviewed With: (P) patient           Outcome Evaluation: (P) Pt seen for PT evaluation today post second toe amputation. Pt in bed upon arrival and voiced that she is in no pain. SBA for all bed mobility requiring VC for foot placement on floor. Pt attempted to don DARCO shoe at EOB independently, but required assistx1. CGAx1 for STS transfer. CGAx1 while ambulating 15' with use of RW; decreased gait speed, decreased step length, forward flexed posture, and mild impulsity. No overt LOB noted, but pt demonstrates mild unsteadiness. Pt voiced feeling anxious towards end of session; HR reached 135-notified nursing. Pt will continue to benefit from skilled PT interventions to address overall functional mobility and safety. Rec SNF vs home with assist at d/c.      Anticipated Discharge Disposition (PT): (P) skilled nursing facility

## 2023-08-07 NOTE — PROGRESS NOTES
Name: Maite Pacheco ADMIT: 2023   : 1935  PCP: Rhys Thomas MD    MRN: [id number] LOS: 4 days   AGE/SEX: 88 y.o. female  ROOM:  [place] [address]/1     CC: Osteomyelitis second toe  Interval History: Complains of problems with her hearing aid and inner ear.  No foot complaints  Subjective   Subjective     Review of Systems  Objective   Objective     Vitals:   Temp:  [97 øF (36.1 øC)-98.6 øF (37 øC)] 97.2 øF (36.2 øC)  Heart Rate:  [] 98  Resp:  [19-20] 20  BP: (111-121)/(60-70) 119/70    No intake/output data recorded.    Scheduled Meds:     acetaminophen, 1,000 mg, Oral, Q6H  atorvastatin, 10 mg, Oral, Q48H  cadexomer iodine, 1 application , Topical, Daily  doxycycline, 100 mg, Oral, BID With Meals  insulin glargine, 8 Units, Subcutaneous, Nightly  insulin lispro, 2-7 Units, Subcutaneous, 4x Daily AC & at Bedtime  ipratropium-albuterol, 3 mL, Nebulization, Q8H - RT  senna-docusate sodium, 2 tablet, Oral, BID  sodium chloride, 10 mL, Intravenous, Q12H      IV Meds:        Physical Exam  Vascular: She can move.  Suture line looks good    Data Reviewed:  CBC    Results from last 7 days   Lab Units 23  0627 23  0451 23  0548 23  0218 23  1721   WBC 10*3/mm3 10.92* 10.02 6.56 5.43 6.79   HEMOGLOBIN g/dL 10.5* 10.2* 10.3* 10.2* 11.3*   PLATELETS 10*3/mm3 213 199 186 196 229       BMP   Results from last 7 days   Lab Units 23  0451 23  0548 23  2303 23  0218 23  1721   SODIUM mmol/L 140 142 145 144 142   POTASSIUM mmol/L 3.5 3.8 4.3 4.1 4.3   CHLORIDE mmol/L 96* 100 103 100 99   CO2 mmol/L 42.0* 38.3* 36.6* 35.1* 39.0*   BUN mg/dL 11 13 18 23 33*   CREATININE mg/dL 0.30* 0.32* 0.46* 0.43* 0.46*   GLUCOSE mg/dL 124* 156* 154* 192* 330*         Most notable findings include: White blood cell count of 10    Active Hospital Problems:   Active Hospital Problems    Diagnosis  POA    **Cellulitis, toe [L03.039]  Yes    Severe malnutrition [E43]   Yes    Chronic respiratory failure with hypoxia [J96.11]  Unknown    Osteomyelitis of second toe of left foot [M86.9]  Unknown    COPD (chronic obstructive pulmonary disease) [J44.9]  Yes    Pulmonary hypertension [I27.20]  Yes    Type 2 diabetes mellitus [E11.9]  Yes    Hyperlipidemia [E78.5]  Yes      Resolved Hospital Problems   No resolved problems to display.      Assessment & Plan   [place], Subsequent Hospital Care  Assessment / Plan     POD#2 s/p transphalangeal toe amputation for osteomyelitis: Resting removed.  Suture line looks good.  Will allow her to ambulate in a postoperative shoe.  Small amount of antibiotic ointment over the wound and then a folded 4 x 4 long ways to fill in the gap a bit.  This can be secured with a single piece of tape between the toes probably if that is easier than an Ace wrap.    Follow-up with me in the office in 2 weeks for wound check.  Would try to leave the sutures for 4 weeks.  Follow-up written in epic.  My office will contact for an appointment.       Best Wheeler MD  23  08:30 EDT  Office Number [phone]            `

## 2023-08-07 NOTE — THERAPY EVALUATION
Patient Name: Maite Pacheco  : 1935    MRN: [id number]                              Today's Date: 2023       Admit Date: 2023    Visit Dx:     ICD-10-CM ICD-9-CM   1. Cellulitis of toe of left foot  L03.032 681.10   2. ESR raised  R70.0 790.1   3. CRP elevated  R79.82 790.95   4. Tachycardia  R00.0 785.0   5. Hyperglycemia  R73.9 790.29   6. Osteomyelitis of second toe of left foot  M86.9 730.27     Patient Active Problem List   Diagnosis    Type 2 diabetes mellitus    Hyperlipidemia    Osteoporosis    Cyst of pancreas    Hernia of abdominal wall    Acidosis    Pulmonary hypertension    Other specified anemias    PVC (premature ventricular contraction)    Abnormal stress echo    Underweight    Closed displaced intertrochanteric fracture of left femur, initial encounter    COPD (chronic obstructive pulmonary disease)    Current moderate episode of major depressive disorder    Cystitis    Cellulitis, toe    Chronic respiratory failure with hypoxia    Severe malnutrition    Osteomyelitis of second toe of left foot     Past Medical History:   Diagnosis Date    Abnormal CT of the abdomen     Allergic rhinitis     Arthritis     Chronic pancreatitis     COPD (chronic obstructive pulmonary disease) 16    Diabetes mellitus     type 2    Eustachian tube dysfunction     History of pneumonia 2016    HL (hearing loss)     Hyperglycemia     Hyperlipidemia     Inguinal hernia     Neck pain     Osteopenia     Osteoporosis     Pancreatic cyst     Pneumonia 16    Urinary frequency      Past Surgical History:   Procedure Laterality Date    APPENDECTOMY N/A 1943    BELOW KNEE AMPUTATION Left 2023    Procedure: Left second toe amputation;  Surgeon: Best Wheeler MD;  Location: [facility] OR;  Service: Vascular;  Laterality: Left;    CARDIAC CATHETERIZATION N/A 10/25/2017    Procedure: Coronary angiography;  Surgeon: Gustavo Flores MD;  Location: [facility] INVASIVE LOCATION;  Service:      CARDIAC CATHETERIZATION N/A 10/25/2017    Procedure: Left heart cath;  Surgeon: Gustavo Flores MD;  Location: [facility] CATH INVASIVE LOCATION;  Service:     CARDIAC CATHETERIZATION N/A 10/25/2017    Procedure: Left ventriculography;  Surgeon: Gustavo Flores MD;  Location: [facility] CATH INVASIVE LOCATION;  Service:     COLONOSCOPY N/A 03/31/2003    Colonoscopy to the cecum, terminal ileoscopy, normal-Dr. Zahraa Brito     FEMUR IM NAILING/RODDING Left 2/18/2023    Procedure: LEFT FEMUR INTRAMEDULLARY NAILING/RODDING;  Surgeon: Best Luna MD;  Location: [facility] OR;  Service: Orthopedics;  Laterality: Left;    INGUINAL HERNIA REPAIR Left 01/18/2008    Open left inguinal hernia repair with mesh-Dr. Zahraa Brito       General Information       [facility] Name 08/07/23 1146          Physical Therapy Time and Intention    Document Type evaluation (P)   -SK     Mode of Treatment individual therapy;physical therapy (P)   -SK       Row Name 08/07/23 1146          General Information    Patient Profile Reviewed yes (P)   -SK     Prior Level of Function independent: (P)   -SK     Existing Precautions/Restrictions fall;oxygen therapy device and L/min;left;other (see comments) (P)   L foot requires DARCO for ambulation.  -SK     Barriers to Rehab hearing deficit (P)   -[facility] Name 08/07/23 1146          Living Environment    People in Home alone (P)   -SK       Row Name 08/07/23 1146          Home Main Entrance    Number of Stairs, Main Entrance none (P)   -SK     Stair Railings, Main Entrance none (P)   -SK       Row Name 08/07/23 1146          Stairs Within Home, Primary    Stairs, Within Home, Primary pt lives in patio home (P)   -SK       Row Name 08/07/23 1146          Cognition    Orientation Status (Cognition) oriented to;person;place;other (see comments) (P)   pt is very anxious this AM  -[facility] Name 08/07/23 1146          Safety Issues, Functional Mobility    Safety Issues Affecting Function  (Mobility) impulsivity;insight into deficits/self-awareness (P)   -SK     Impairments Affecting Function (Mobility) balance;endurance/activity tolerance;postural/trunk control;shortness of breath;strength (P)   -SK     Comment, Safety Issues/Impairments (Mobility) DARCO shoe donned. (P)   -SK               User Key  (r) = Recorded By, (t) = Taken By, (c) = Cosigned By      Initials Name Provider Type    Blanche Reeves, PT Student PT Student                   Mobility       Row Name 08/07/23 1149          Bed Mobility    Bed Mobility bed mobility (all) activities (P)   -SK     All Activities, [place] (Bed Mobility) independent (P)   -SK     Assistive Device (Bed Mobility) head of bed elevated (P)   -SK       Row Name 08/07/23 1149          Sit-Stand Transfer    Sit-Stand [place] (Transfers) verbal cues;contact guard (P)   -SK     Assistive Device (Sit-Stand Transfers) walker, front-wheeled (P)   -SK       Row Name 08/07/23 1149          Gait/Stairs (Locomotion)    [place] Level (Gait) contact guard;verbal cues (P)   -SK     Assistive Device (Gait) walker, front-wheeled (P)   -SK     Distance in Feet (Gait) 15ft (P)   -SK     Deviations/Abnormal Patterns (Gait) base of support, narrow;festinating/shuffling;gait speed decreased;stride length decreased (P)   -SK               User Key  (r) = Recorded By, (t) = Taken By, (c) = Cosigned By      Initials Name Provider Type    Blanche Reeves, PT Student PT Student                   Obj/Interventions       Row Name 08/07/23 1152          Range of Motion Comprehensive    Comment, General Range of Motion BUE and LE WNL (P)   -SK       Row Name 08/07/23 1152          Strength Comprehensive (MMT)    Comment, General Manual Muscle Testing (MMT) Assessment BUE and LE WNL (P)   -SK       Row Name 08/07/23 1152          Balance    Sit to Stand Dynamic Balance verbal cues;contact guard;1-person assist (P)   -SK               User Key  (r) = Recorded By, (t) =  Taken By, (c) = Cosigned By      Initials Name Provider Type    Blanche Reeves, PT Student PT Student                   Goals/Plan       Row Name 08/07/23 1202          Bed Mobility Goal 1 (PT)    Activity/Assistive Device (Bed Mobility Goal 1, PT) bed mobility activities, all (P)   -SK     [place] Level/Cues Needed (Bed Mobility Goal 1, PT) independent (P)   -SK     Time Frame (Bed Mobility Goal 1, PT) long term goal (LTG);5 days (P)   -SK       Row Name 08/07/23 1202          Transfer Goal 1 (PT)    Activity/Assistive Device (Transfer Goal 1, PT) transfers, all (P)   -SK     [place] Level/Cues Needed (Transfer Goal 1, PT) independent (P)   -SK     Time Frame (Transfer Goal 1, PT) long term goal (LTG);5 days (P)   -SK       Row Name 08/07/23 1202          Gait Training Goal 1 (PT)    Activity/Assistive Device (Gait Training Goal 1, PT) gait (walking locomotion);assistive device use;increase endurance/gait distance;improve balance and speed;walker, rolling (P)   -SK     [place] Level (Gait Training Goal 1, PT) standby assist (P)   -SK     Distance (Gait Training Goal 1, PT) 50ft (P)   -SK     Time Frame (Gait Training Goal 1, PT) long term goal (LTG);5 days (P)   -SK       Row Name 08/07/23 1202          Therapy Assessment/Plan (PT)    Planned Therapy Interventions (PT) balance training;gait training;patient/family education;postural re-education;strengthening (P)   -SK               User Key  (r) = Recorded By, (t) = Taken By, (c) = Cosigned By      Initials Name Provider Type    Blanche Reeves, PT Student PT Student                   Clinical Impression       Row Name 08/07/23 1153          Pain    Pretreatment Pain Rating 0/10 - no pain (P)   -SK     Posttreatment Pain Rating 0/10 - no pain (P)   -SK       Row Name 08/07/23 1153          Plan of Care Review    Plan of Care Reviewed With patient (P)   -SK     Outcome Evaluation Pt seen for PT evaluation today post second toe amputation. Pt in  bed upon arrival and voiced that she is in no pain. SBA for all bed mobility requiring VC for foot placement on floor. Pt attempted to don DARCO shoe at EOB independently, but required assistx1. CGAx1 for STS transfer. CGAx1 while ambulating 15' with use of RW; decreased gait speed, decreased step length, forward flexed posture, and mild impulsity. No overt LOB noted, but pt demonstrates mild unsteadiness. Pt voiced feeling anxious towards end of session; HR reached 135-notified nursing. Pt will continue to benefit from skilled PT interventions to address overall functional mobility and safety. Rec SNF vs home with assist at d/c. (P)   -SK       Row Name 08/07/23 1153          Therapy Assessment/Plan (PT)    Rehab Potential (PT) good, to achieve stated therapy goals (P)   -SK     Criteria for Skilled Interventions Met (PT) yes (P)   -SK     Therapy Frequency (PT) 5 times/wk (P)   -SK       Row Name 08/07/23 1153          Vital Signs    O2 Delivery Pre Treatment supplemental O2 (P)   -SK     O2 Delivery Post Treatment supplemental O2 (P)   -SK       Row Name 08/07/23 1153          Positioning and Restraints    Pre-Treatment Position in bed (P)   -SK     Post Treatment Position bed (P)   -SK     In Bed notified nsg;call light within reach;encouraged to call for assist;exit alarm on (P)   -SK               User Key  (r) = Recorded By, (t) = Taken By, (c) = Cosigned By      Initials Name Provider Type    Blanche Reeves, PT Student PT Student                   Outcome Measures       Row Name 08/07/23 1204          How much help from another person do you currently need...    Turning from your back to your side while in flat bed without using bedrails? 4 (P)   -SK     Moving from lying on back to sitting on the side of a flat bed without bedrails? 4 (P)   -SK     Moving to and from a bed to a chair (including a wheelchair)? 3 (P)   -SK     Standing up from a chair using your arms (e.g., wheelchair, bedside chair)? 3  (P)   -SK     Climbing 3-5 steps with a railing? 2 (P)   -SK     To walk in hospital room? 3 (P)   -SK     AM-PAC 6 Clicks Score (PT) 19 (P)   -SK     Highest level of mobility 6 --> Walked 10 steps or more (P)   -SK       Row Name 08/07/23 1204          Functional Assessment    Outcome Measure Options AM-PAC 6 Clicks Basic Mobility (PT) (P)   -SK               User Key  (r) = Recorded By, (t) = Taken By, (c) = Cosigned By      Initials Name Provider Type    SK Blanche Stevens, PT Student PT Student                                 Physical Therapy Education       Title: PT OT SLP Therapies (Done)       Topic: Physical Therapy (Done)       Point: Mobility training (Done)       Learning Progress Summary             Patient Acceptance, E,TB, VU,NR by SK at 8/7/2023 1204                         Point: Body mechanics (Done)       Learning Progress Summary             Patient Acceptance, E,TB, VU,NR by SK at 8/7/2023 1204                         Point: Precautions (Done)       Learning Progress Summary             Patient Acceptance, E,TB, VU,NR by SK at 8/7/2023 1204                                         User Key       Initials Effective Dates Name Provider Type Discipline    SK 07/28/23 -  Blanche Stevens, PT Student PT Student PT                  PT Recommendation and Plan  Planned Therapy Interventions (PT): (P) balance training, gait training, patient/family education, postural re-education, strengthening  Plan of Care Reviewed With: (P) patient  Outcome Evaluation: (P) Pt seen for PT evaluation today post second toe amputation. Pt in bed upon arrival and voiced that she is in no pain. SBA for all bed mobility requiring VC for foot placement on floor. Pt attempted to don DARCO shoe at EOB independently, but required assistx1. CGAx1 for STS transfer. CGAx1 while ambulating 15' with use of RW; decreased gait speed, decreased step length, forward flexed posture, and mild impulsity. No overt LOB noted, but pt  demonstrates mild unsteadiness. Pt voiced feeling anxious towards end of session; HR reached 135-notified nursing. Pt will continue to benefit from skilled PT interventions to address overall functional mobility and safety. Rec SNF vs home with assist at d/c.     Time Calculation:         PT Charges       Row Name 08/07/23 1205             Time Calculation    Start Time 1105 (P)   -SK      Stop Time 1128 (P)   -SK      Time Calculation (min) 23 min (P)   -SK      PT Received On 08/07/23 (P)   -SK      PT - Next Appointment 08/08/23 (P)   -SK         Time Calculation- PT    Total Timed Code Minutes- PT 10 minute(s) (P)   -SK                User Key  (r) = Recorded By, (t) = Taken By, (c) = Cosigned By      Initials Name Provider Type    SK Blanche Stevens, PT Student PT Student                  Therapy Charges for Today       Code Description Service Date Service Provider Modifiers Qty    49520870971 HC PT EVAL MOD COMPLEXITY 3 8/7/2023 Blanche Stevens, PT Student GP 1    [id number]  PT THERAPEUTIC ACT EA 15 MIN 8/7/2023 Blanche Stevens, PT Student GP 1            PT G-Codes  Outcome Measure Options: (P) AM-PAC 6 Clicks Basic Mobility (PT)  AM-PAC 6 Clicks Score (PT): (P) 19  PT Discharge Summary  Anticipated Discharge Disposition (PT): (P) skilled nursing facility  Discharge Destination: (P) SNF    ISAÍAS Corbett  8/7/2023

## 2023-08-08 VITALS
HEART RATE: 91 BPM | SYSTOLIC BLOOD PRESSURE: 104 MMHG | WEIGHT: 80.25 LBS | HEIGHT: 62 IN | TEMPERATURE: 97.9 F | BODY MASS INDEX: 14.77 KG/M2 | RESPIRATION RATE: 20 BRPM | DIASTOLIC BLOOD PRESSURE: 62 MMHG | OXYGEN SATURATION: 93 %

## 2023-08-08 LAB
ANION GAP SERPL CALCULATED.3IONS-SCNC: 6 MMOL/L (ref 5–15)
BUN SERPL-MCNC: 27 MG/DL (ref 8–23)
BUN/CREAT SERPL: 69.2 (ref 7–25)
CALCIUM SPEC-SCNC: 9.3 MG/DL (ref 8.6–10.5)
CHLORIDE SERPL-SCNC: 93 MMOL/L (ref 98–107)
CO2 SERPL-SCNC: 39 MMOL/L (ref 22–29)
CREAT SERPL-MCNC: 0.39 MG/DL (ref 0.57–1)
DEPRECATED RDW RBC AUTO: 40.2 FL (ref 37–54)
EGFRCR SERPLBLD CKD-EPI 2021: 97.1 ML/MIN/1.73
ERYTHROCYTE [DISTWIDTH] IN BLOOD BY AUTOMATED COUNT: 12.1 % (ref 12.3–15.4)
GLUCOSE BLDC GLUCOMTR-MCNC: 249 MG/DL (ref 70–130)
GLUCOSE SERPL-MCNC: 300 MG/DL (ref 65–99)
HCT VFR BLD AUTO: 29.3 % (ref 34–46.6)
HGB BLD-MCNC: 9.6 G/DL (ref 12–15.9)
LAB AP CASE REPORT: NORMAL
MCH RBC QN AUTO: 29.9 PG (ref 26.6–33)
MCHC RBC AUTO-ENTMCNC: 32.8 G/DL (ref 31.5–35.7)
MCV RBC AUTO: 91.3 FL (ref 79–97)
PATH REPORT.FINAL DX SPEC: NORMAL
PATH REPORT.GROSS SPEC: NORMAL
PLATELET # BLD AUTO: 194 10*3/MM3 (ref 140–450)
PMV BLD AUTO: 9.3 FL (ref 6–12)
POTASSIUM SERPL-SCNC: 3.5 MMOL/L (ref 3.5–5.2)
RBC # BLD AUTO: 3.21 10*6/MM3 (ref 3.77–5.28)
SODIUM SERPL-SCNC: 138 MMOL/L (ref 136–145)
WBC NRBC COR # BLD: 10.54 10*3/MM3 (ref 3.4–10.8)

## 2023-08-08 PROCEDURE — 80048 BASIC METABOLIC PNL TOTAL CA: CPT | Performed by: SURGERY

## 2023-08-08 PROCEDURE — 85027 COMPLETE CBC AUTOMATED: CPT | Performed by: SURGERY

## 2023-08-08 PROCEDURE — 94760 N-INVAS EAR/PLS OXIMETRY 1: CPT

## 2023-08-08 PROCEDURE — 94799 UNLISTED PULMONARY SVC/PX: CPT

## 2023-08-08 PROCEDURE — 94664 DEMO&/EVAL PT USE INHALER: CPT

## 2023-08-08 PROCEDURE — 94761 N-INVAS EAR/PLS OXIMETRY MLT: CPT

## 2023-08-08 PROCEDURE — 82948 REAGENT STRIP/BLOOD GLUCOSE: CPT

## 2023-08-08 PROCEDURE — 63710000001 INSULIN LISPRO (HUMAN) PER 5 UNITS: Performed by: SURGERY

## 2023-08-08 RX ORDER — DOXYCYCLINE 100 MG/1
100 CAPSULE ORAL 2 TIMES DAILY WITH MEALS
Qty: 5 CAPSULE | Refills: 0
Start: 2023-08-08 | End: 2023-08-11

## 2023-08-08 RX ORDER — INSULIN LISPRO 100 [IU]/ML
2-7 INJECTION, SOLUTION INTRAVENOUS; SUBCUTANEOUS
Refills: 12
Start: 2023-08-08

## 2023-08-08 RX ORDER — IBUPROFEN 600 MG/1
1 TABLET ORAL
Start: 2023-08-08

## 2023-08-08 RX ORDER — ONDANSETRON 4 MG/1
4 TABLET, FILM COATED ORAL EVERY 6 HOURS PRN
Start: 2023-08-08

## 2023-08-08 RX ORDER — ACETAMINOPHEN 325 MG/1
650 TABLET ORAL EVERY 4 HOURS PRN
Start: 2023-08-08

## 2023-08-08 RX ORDER — NICOTINE POLACRILEX 4 MG
15 LOZENGE BUCCAL
Start: 2023-08-08

## 2023-08-08 RX ORDER — BISACODYL 10 MG
10 SUPPOSITORY, RECTAL RECTAL DAILY PRN
Start: 2023-08-08

## 2023-08-08 RX ORDER — POLYETHYLENE GLYCOL 3350 17 G/17G
17 POWDER, FOR SOLUTION ORAL DAILY PRN
Start: 2023-08-08

## 2023-08-08 RX ORDER — BISACODYL 5 MG/1
5 TABLET, DELAYED RELEASE ORAL DAILY PRN
Start: 2023-08-08

## 2023-08-08 RX ORDER — AMOXICILLIN 250 MG
2 CAPSULE ORAL 2 TIMES DAILY
Start: 2023-08-08

## 2023-08-08 RX ADMIN — INSULIN LISPRO 3 UNITS: 100 INJECTION, SOLUTION INTRAVENOUS; SUBCUTANEOUS at 08:23

## 2023-08-08 RX ADMIN — IPRATROPIUM BROMIDE AND ALBUTEROL SULFATE 3 ML: 2.5; .5 SOLUTION RESPIRATORY (INHALATION) at 07:32

## 2023-08-08 RX ADMIN — SENNOSIDES AND DOCUSATE SODIUM 2 TABLET: 50; 8.6 TABLET ORAL at 08:23

## 2023-08-08 RX ADMIN — DOXYCYCLINE 100 MG: 100 CAPSULE ORAL at 08:23

## 2023-08-08 RX ADMIN — Medication 1 APPLICATION: at 08:24

## 2023-08-08 NOTE — PROGRESS NOTES
Case Management Discharge Note      Final Note: Discharged to [place], skilled bed and [facility] transported today. Notified [facility]. RAPHAEL Breaux RN, CCP.    Provided Post Acute Provider List?: Yes  Post Acute Provider List: Nursing Home, Home Health  Delivered To: Patient  Method of Delivery: In person    Selected Continued Care - Discharged on 8/8/2023 Admission date: 8/2/2023 - Discharge disposition: Rehab Facility or Unit (DC - External)      Destination Coordination complete.      Service Provider Selected Services Address Phone Fax Patient Preferred    [facility] [address] [place] [address] , [facility] [address] [phone] --              Durable Medical Equipment    No services have been selected for the patient.                Dialysis/Infusion    No services have been selected for the patient.                Home Medical Care    No services have been selected for the patient.                Therapy    No services have been selected for the patient.                Community Resources    No services have been selected for the patient.                Community & DME    No services have been selected for the patient.                    Transportation Services  Ambulance: [facility]    Final Discharge Disposition Code: 03 - skilled nursing facility (SNF)

## 2023-08-08 NOTE — NURSING NOTE
Report called to [facility] at 8:30am,  said the unit wasn't answering and she took my name and number. Was told someone will call back     Called to give report again at 9:02am,  transferred me to the floor. Again no answer.

## 2023-08-08 NOTE — DISCHARGE SUMMARY
"                                                                   PHYSICIAN DISCHARGE SUMMARY                                                                        [facility]    Patient Identification:  Name: Maite Pacheco  Age: 88 y.o.  Sex: female  :  1935  MRN: [id number]  Primary Care Physician: Rhys Thomas MD    Admit date: 2023  Discharge date and time: 2023     Discharged Condition: good    Discharge Diagnoses:  Osteomyelitis-left second toe: Status post amputation 2023.    Severe malnutrition:   COPD  Pulmonary hypertension  Chronic hypoxic respiratory failure: 2nd to above.  Diabetes type 2:       Hospital Course:  Pleasant, very frail 88-year-old female with chronic hypoxic respiratory failure presents with persistent wound and infection in the left second toe.  Please see H&P for full details.  MRI revealed:  :Soft tissue wound/ulcer dorsal to the head of the second  proximal phalanx with underlying osteomyelitis involving the distal  shaft and head of the second proximal phalanx and septic arthritis of  the second PIP joint as well as cellulitis of the second toe\"  Flex disease and vascular surgery consultation were both obtained.  She did undergo resection of the infected toe on 2023 with clean margins.  Cultures are negative.  She was treated with intravenous vancomycin preop and postop this was transitioned to oral doxycycline.  She has remained afebrile vital signs stable with exception of her chronic hypoxic respiratory failure.  She is medically stable but does not appear to be capable of caring for self as she does live alone.  Arrangements are being made for her to be transferred to rehab facility.  Notable that she has diabetes as well as severe malnutrition.  She is on metformin which has been associated with anorexia and I have taken the liberty to switch her over to the Lantus.    Consults:     Consults       Date and Time Order Name Status " Description    8/4/2023 11:40 AM Inpatient Infectious Diseases Consult Completed     8/4/2023 10:05 AM Inpatient Vascular Surgery Consult Completed     8/2/2023 11:03 PM [facility] (on-call MD unless specified) Details                Discharge Exam:  Afebrile vital signs stable.  Well-developed exceedingly thin and frail female in no apparent distress.  Lungs clear to auscultation.  Room air O2 sats in the mid 90s on 4 L nasal cannula which is her baseline.  Heart regular rate and rhythm.  Extremities no clubbing cyanosis or edema.  Left foot is wrapped and dressed.  She is alert oriented conversant cooperative and pleasant.     Disposition:  Rehab    Patient Instructions:      Discharge Medications        New Medications        Instructions Start Date   acetaminophen 325 MG tablet  Commonly known as: TYLENOL   650 mg, Oral, Every 4 Hours PRN      bisacodyl 5 MG EC tablet  Commonly known as: DULCOLAX   5 mg, Oral, Daily PRN      bisacodyl 10 MG suppository  Commonly known as: DULCOLAX   10 mg, Rectal, Daily PRN      cadexomer iodine 0.9 % gel  Commonly known as: IODOSORB   1 application , Topical, Daily      dextrose 40 % gel  Commonly known as: GLUTOSE   15 g, Oral, Every 15 Minutes PRN      doxycycline 100 MG capsule  Commonly known as: MONODOX   100 mg, Oral, 2 Times Daily With Meals      glucagon 1 MG injection  Commonly known as: GLUCAGEN   1 mg, Intramuscular, Every 15 Minutes PRN      insulin glargine 100 UNIT/ML injection  Commonly known as: LANTUS, SEMGLEE   10 Units, Subcutaneous, Nightly      insulin lispro 100 UNIT/ML injection  Commonly known as: HUMALOG/ADMELOG   2-7 Units, Subcutaneous, 4 Times Daily Before Meals & Nightly      ondansetron 4 MG tablet  Commonly known as: ZOFRAN   4 mg, Oral, Every 6 Hours PRN      polyethylene glycol 17 g packet  Commonly known as: MIRALAX   17 g, Oral, Daily PRN      sennosides-docusate 8.6-50 MG per tablet  Commonly known as: PERICOLACE   2 tablets, Oral, 2 Times Daily              Changes to Medications        Instructions Start Date   atorvastatin 10 MG tablet  Commonly known as: LIPITOR  What changed: when to take this   10 mg, Oral, Daily             Continue These Medications        Instructions Start Date   calcium carbonate 600 MG tablet  Commonly known as: OS-ROSAMARIA   1,200 mg, Oral, Daily      cholecalciferol 25 MCG (1000 UT) tablet  Commonly known as: VITAMIN D3   1,000 Units, Oral, Daily, Takes takes from October to May       multivitamin tablet tablet  Commonly known as: THERAGRAN   1 tablet, Oral, Daily      umeclidinium-vilanterol 62.5-25 MCG/INH aerosol powder  inhaler  Commonly known as: ANORO ELLIPTA   1 puff, Inhalation, Daily             Stop These Medications      FLUoxetine 20 MG capsule  Commonly known as: PROzac     hydrOXYzine 10 MG tablet  Commonly known as: ATARAX     metFORMIN  MG 24 hr tablet  Commonly known as: GLUCOPHAGE-XR     mupirocin 2 % ointment  Commonly known as: BACTROBAN            ASK your doctor about these medications        Instructions Start Date   vitamin C 500 MG tablet  Commonly known as: ASCORBIC ACID   Take  by mouth.               No future appointments.  Additional Instructions for the Follow-ups that You Need to Schedule       Discharge Follow-up with PCP   As directed       Currently Documented PCP:    Rhys Thomas MD    PCP Phone Number:    [phone]     Follow Up Details: 1 week        Discharge Follow-up with Specialty: Vascular surgery   As directed      Specialty: Vascular surgery   Follow Up Details: As directed.               Contact information for follow-up providers       Best Wheeler MD Follow up in 2 week(s).    Specialty: Vascular Surgery  Contact information:  [address] [id number] [phone]               Rhys Thomas MD .    Specialty: Internal Medicine  Why: 1 week  Contact information:  [address] [facility] [id number] [phone]                        Contact information for after-discharge care       Destination       [facility] .    Service: Skilled Nursing  Contact information:  [address] [place] [address] [phone]                                 Discharge Order (From admission, onward)       Start     Ordered    08/08/23 0805  Discharge patient  Once        Expected Discharge Date: 08/08/23   Discharge Disposition: Rehab Facility or Unit (DC - External)   Physician of Record for Attribution - Please select from Treatment Team: LIU URBANO [[id number]]   Review needed by CMO to determine Physician of Record: No      Question Answer Comment   Physician of Record for Attribution - Please select from Treatment Team LIU URBANO    Review needed by CMO to determine Physician of Record No        08/08/23 0810                      Total time spent discharging patient including evaluation,post hospitalization follow up,  medication and post hospitalization instructions and education total time exceeds 30 minutes.    Signed:  Liu Urbano MD  8/8/2023  08:15 EDT    EMR Dragon/Transcription disclaimer:   Much of this encounter note is an electronic transcription/translation of spoken language to printed text. The electronic translation of spoken language may permit erroneous, or at times, nonsensical words or phrases to be inadvertently transcribed; Although I have reviewed the note for such errors, some may still exist.

## 2025-03-26 NOTE — PROGRESS NOTES
Patient is scheduled 8/4 with Araceli Taylor for annual exam.    Re-Assessment / Re-Certification        Patient: Maite Pacheco   : 1935  Diagnosis/ICD-10 Code:  Pain, neck [M54.2]  Referring practitioner: SINA Thomas  Date of Initial Evaluation:  Type: THERAPY  Noted: 2019  Patient seen for 6 sessions      Subjective:   Maite Pacheco reports: Neck has improved. Still bothers me when I get up in the morning but when I do my exercises and get moving it gets better. Still has some discomfort turning head while driving. Rates pain 4/10.  Subjective Questionnaire: NDI:14/100  Clinical Progress: improved  Home Program Compliance: Yes  Treatment has included: therapeutic exercise, manual therapy, electrical stimulation and moist heat    Subjective   Objective       Active Range of Motion   Cervical/Thoracic Spine   Cervical    Flexion: 42 degrees   Extension: 50 degrees   Left lateral flexion: 15 degrees with pain  Right lateral flexion: 18 degrees   Left rotation: 25 degrees with pain  Right rotation: 42 degrees      Assessment/Plan  Progress toward previous goals: Partially Met    Subjective reports of pain improving. Continues to have significantly limited L cervical rotation. Excellent tolerance to manual therapy and exercise. Will continue to benefit from skilled PT to improve functional ROM and decrease pain with ADLs.    6 weeks  Patient will:  1. Report pain of </= 1/10 with all daily activities  2. Demonstrate no soft tissue restriction to allow for pain-free cervical AROM WFL  3. Perceived disability </=10% as measured by Neck Disability Index  4. Be independent with long term HEP        Recommendations: Continue with recommendations 1x/week  Timeframe: 6 weeks  Prognosis to achieve goals: good    PT Signature: Bridgette Delong PT      Based upon review of the patient's progress and continued therapy plan, it is my medical opinion that Maite Pacheco should continue physical therapy treatment at [facility] PH[facility]  [address]  [facility] [address]  [phone].    Signature: __________________________________  Reva Salinas APRN    Manual Therapy:    15     mins  95862;  Therapeutic Exercise:    20     mins  49072;     Neuromuscular Pallavi:    0    mins  33481;    Therapeutic Activity:     5     mins  75763;     Gait Trainin     mins  42850;     Ultrasound:     0     mins  83453;    Work Hardening           0      mins 00179  Iontophoresis               0   mins 24097    Timed Treatment:   40   mins   Total Treatment:     60   mins

## 2025-07-10 NOTE — ANESTHESIA POSTPROCEDURE EVALUATION
Patient: Maite Pacheco    Procedure Summary       Date: 02/18/23 Room / Location: [facility] OR [address] [place] [address] MAIN OR    Anesthesia Start: 1252 Anesthesia Stop: 1352    Procedure: LEFT FEMUR INTRAMEDULLARY NAILING/RODDING (Left: Thigh) Diagnosis:     Surgeons: Best Luna MD Provider: Bonilla Rodrigez MD    Anesthesia Type: general ASA Status: 4            Anesthesia Type: general    Vitals  Vitals Value Taken Time   /59 02/18/23 15:15   Temp 36.8 °C (98.3 °F) 02/18/23 14:40   Pulse 103 02/18/23 15:15   Resp 15 02/18/23 15:15   SpO2 99 % 02/18/23 15:15           Post Anesthesia Care and Evaluation      Comments: An anesthesiologist was immediately available throughout the labor and delivery period.

## 2025-07-10 NOTE — ANESTHESIA POSTPROCEDURE EVALUATION
Caller: DR LAURIE KIMBLE OFFICE    Relationship:     Best call back number: [phone]    What form or medical record are you requesting: ABDOMINAL ULTRASOUND RESULTS    Who is requesting this form or medical record from you: DR KIMBLE    How would you like to receive the form or medical records (pick-up, mail, fax): FAX  If fax, what is the fax number: [phone]    Timeframe paperwork needed: ASAP    Additional notes: PATIENT APPT 4-28-25           Patient: Maite Pacheco    Procedure Summary       Date: 02/18/23 Room / Location: [facility] OR [address] [place] [address] MAIN OR    Anesthesia Start: 1252 Anesthesia Stop: 1352    Procedure: LEFT FEMUR INTRAMEDULLARY NAILING/RODDING (Left: Thigh) Diagnosis:     Surgeons: Best Luna MD Provider: Bonilla Rodrigez MD    Anesthesia Type: general ASA Status: 4            Anesthesia Type: general    Vitals  Vitals Value Taken Time   /59 02/18/23 15:15   Temp 36.8 °C (98.3 °F) 02/18/23 14:40   Pulse 103 02/18/23 15:15   Resp 15 02/18/23 15:15   SpO2 99 % 02/18/23 15:15           Anesthesia Post Evaluation

## (undated) DEVICE — GLIDESHEATH BASIC HYDROPHILIC COATED INTRODUCER SHEATH: Brand: GLIDESHEATH

## (undated) DEVICE — APPL CHLORAPREP HI/LITE 26ML ORNG

## (undated) DEVICE — ANTIBACTERIAL UNDYED BRAIDED (POLYGLACTIN 910), SYNTHETIC ABSORBABLE SUTURE: Brand: COATED VICRYL

## (undated) DEVICE — TRAP FLD MINIVAC MEGADYNE 100ML

## (undated) DEVICE — CATH DIAG IMPULSE FL3.5 5F 100CM

## (undated) DEVICE — DRSNG GZ PETROLTM XEROFORM CURAD 1X8IN STRL

## (undated) DEVICE — PAD,ABDOMINAL,8"X10",ST,LF: Brand: MEDLINE

## (undated) DEVICE — T-DRAPE,EXTREMITY,STERILE: Brand: MEDLINE

## (undated) DEVICE — SUT SILK 2/0 TIES 18IN A185H

## (undated) DEVICE — SUT SILK 2/0 SH CR8 18IN CR8 C012D

## (undated) DEVICE — GLV SURG PREMIERPRO ORTHO LTX PF SZ8 BRN

## (undated) DEVICE — GAUZE,SPONGE,FLUFF,6"X6.75",STRL,10/TRAY: Brand: MEDLINE

## (undated) DEVICE — Device

## (undated) DEVICE — GLV SURG SIGNATURE ESSENTIAL PF LTX SZ8.5

## (undated) DEVICE — GUIDEPIN TEMP THRD 3.2X508MM DISP

## (undated) DEVICE — CATH DIAG IMPULSE FR4 5F 100CM

## (undated) DEVICE — GW TEAR DROP NAT 3X100CM

## (undated) DEVICE — SUT VIC 2/0 TIES 18IN J111T

## (undated) DEVICE — GW EMR FIX EXCHG J STD .035 3MM 260CM

## (undated) DEVICE — BNDG ELAS ELITE V/CLOSE 4IN 5YD LF STRL

## (undated) DEVICE — STPLR SKIN VISISTAT WD 35CT

## (undated) DEVICE — BANDAGE,GAUZE,BULKEE II,4.5"X4.1YD,STRL: Brand: MEDLINE

## (undated) DEVICE — TBG PENCL TELESCP MEGADYNE SMOKE EVAC 10FT

## (undated) DEVICE — MAT FLR ABSORBENT LG 4FT 10 2.5FT

## (undated) DEVICE — GOWN,NON-REINFORCED,SIRUS,SET IN SLV,XL: Brand: MEDLINE

## (undated) DEVICE — SUT SILK 3/0 TIES 18IN A184H

## (undated) DEVICE — GLV SURG BIOGEL LTX PF 8

## (undated) DEVICE — CATH DIAG IMPULSE PIG 5F 100CM

## (undated) DEVICE — LEGGINGS, PAIR, CLEAR, STERILE: Brand: MEDLINE

## (undated) DEVICE — BNDG ELAS CO-FLEX SLF ADHR 4IN5YD LF STRL

## (undated) DEVICE — SUT SILK 0 TIES 18IN SA66G

## (undated) DEVICE — PK CATH CARD 40

## (undated) DEVICE — GLV SURG BIOGEL LTX PF 7

## (undated) DEVICE — KT MANIFLD CARDIAC

## (undated) DEVICE — PK ORTHO MAJ 40

## (undated) DEVICE — PK HIP PINNING 40